# Patient Record
Sex: FEMALE | Race: WHITE | NOT HISPANIC OR LATINO | Employment: OTHER | URBAN - METROPOLITAN AREA
[De-identification: names, ages, dates, MRNs, and addresses within clinical notes are randomized per-mention and may not be internally consistent; named-entity substitution may affect disease eponyms.]

---

## 2021-01-01 ENCOUNTER — HOSPITAL ENCOUNTER (OUTPATIENT)
Facility: HOSPITAL | Age: 81
Discharge: NURSING, REHAB OR LONG-TERM CARE | End: 2021-01-03
Attending: EMERGENCY MEDICINE | Admitting: INTERNAL MEDICINE

## 2021-01-01 DIAGNOSIS — K92.2 GASTROINTESTINAL HEMORRHAGE, UNSPECIFIED GASTROINTESTINAL HEMORRHAGE TYPE: Primary | ICD-10-CM

## 2021-01-01 DIAGNOSIS — K44.9 HIATAL HERNIA: ICD-10-CM

## 2021-01-01 LAB
BASOPHILS # BLD AUTO: 0.1 10*3/MM3 (ref 0–0.2)
BASOPHILS NFR BLD AUTO: 0.6 % (ref 0–1.5)
BILIRUB UR QL STRIP: NEGATIVE
CLARITY UR: ABNORMAL
COLOR UR: YELLOW
DEPRECATED RDW RBC AUTO: 49.4 FL (ref 37–54)
EOSINOPHIL # BLD AUTO: 0.2 10*3/MM3 (ref 0–0.4)
EOSINOPHIL NFR BLD AUTO: 2.3 % (ref 0.3–6.2)
ERYTHROCYTE [DISTWIDTH] IN BLOOD BY AUTOMATED COUNT: 15.1 % (ref 12.3–15.4)
GLUCOSE UR STRIP-MCNC: NEGATIVE MG/DL
HCT VFR BLD AUTO: 37.4 % (ref 34–46.6)
HGB BLD-MCNC: 12.4 G/DL (ref 12–15.9)
HGB UR QL STRIP.AUTO: ABNORMAL
KETONES UR QL STRIP: ABNORMAL
LEUKOCYTE ESTERASE UR QL STRIP.AUTO: ABNORMAL
LYMPHOCYTES # BLD AUTO: 1.8 10*3/MM3 (ref 0.7–3.1)
LYMPHOCYTES NFR BLD AUTO: 18.7 % (ref 19.6–45.3)
MCH RBC QN AUTO: 30.9 PG (ref 26.6–33)
MCHC RBC AUTO-ENTMCNC: 33.3 G/DL (ref 31.5–35.7)
MCV RBC AUTO: 92.9 FL (ref 79–97)
MONOCYTES # BLD AUTO: 0.9 10*3/MM3 (ref 0.1–0.9)
MONOCYTES NFR BLD AUTO: 9.2 % (ref 5–12)
NEUTROPHILS NFR BLD AUTO: 6.6 10*3/MM3 (ref 1.7–7)
NEUTROPHILS NFR BLD AUTO: 69.2 % (ref 42.7–76)
NITRITE UR QL STRIP: NEGATIVE
NRBC BLD AUTO-RTO: 0.1 /100 WBC (ref 0–0.2)
PH UR STRIP.AUTO: 6.5 [PH] (ref 5–8)
PLATELET # BLD AUTO: 323 10*3/MM3 (ref 140–450)
PMV BLD AUTO: 8 FL (ref 6–12)
PROT UR QL STRIP: ABNORMAL
RBC # BLD AUTO: 4.02 10*6/MM3 (ref 3.77–5.28)
SP GR UR STRIP: 1.02 (ref 1–1.03)
UROBILINOGEN UR QL STRIP: ABNORMAL
WBC # BLD AUTO: 9.6 10*3/MM3 (ref 3.4–10.8)

## 2021-01-01 PROCEDURE — 81001 URINALYSIS AUTO W/SCOPE: CPT | Performed by: EMERGENCY MEDICINE

## 2021-01-01 PROCEDURE — 80053 COMPREHEN METABOLIC PANEL: CPT | Performed by: EMERGENCY MEDICINE

## 2021-01-01 PROCEDURE — 96374 THER/PROPH/DIAG INJ IV PUSH: CPT

## 2021-01-01 PROCEDURE — 85730 THROMBOPLASTIN TIME PARTIAL: CPT | Performed by: EMERGENCY MEDICINE

## 2021-01-01 PROCEDURE — 82150 ASSAY OF AMYLASE: CPT | Performed by: EMERGENCY MEDICINE

## 2021-01-01 PROCEDURE — 86850 RBC ANTIBODY SCREEN: CPT | Performed by: EMERGENCY MEDICINE

## 2021-01-01 PROCEDURE — 83036 HEMOGLOBIN GLYCOSYLATED A1C: CPT | Performed by: INTERNAL MEDICINE

## 2021-01-01 PROCEDURE — P9612 CATHETERIZE FOR URINE SPEC: HCPCS

## 2021-01-01 PROCEDURE — C9803 HOPD COVID-19 SPEC COLLECT: HCPCS

## 2021-01-01 PROCEDURE — 87086 URINE CULTURE/COLONY COUNT: CPT | Performed by: EMERGENCY MEDICINE

## 2021-01-01 PROCEDURE — 93005 ELECTROCARDIOGRAM TRACING: CPT | Performed by: EMERGENCY MEDICINE

## 2021-01-01 PROCEDURE — 87077 CULTURE AEROBIC IDENTIFY: CPT | Performed by: EMERGENCY MEDICINE

## 2021-01-01 PROCEDURE — 86901 BLOOD TYPING SEROLOGIC RH(D): CPT

## 2021-01-01 PROCEDURE — 84484 ASSAY OF TROPONIN QUANT: CPT | Performed by: EMERGENCY MEDICINE

## 2021-01-01 PROCEDURE — 86900 BLOOD TYPING SEROLOGIC ABO: CPT

## 2021-01-01 PROCEDURE — 85025 COMPLETE CBC W/AUTO DIFF WBC: CPT | Performed by: EMERGENCY MEDICINE

## 2021-01-01 PROCEDURE — 87635 SARS-COV-2 COVID-19 AMP PRB: CPT | Performed by: EMERGENCY MEDICINE

## 2021-01-01 PROCEDURE — 85610 PROTHROMBIN TIME: CPT | Performed by: EMERGENCY MEDICINE

## 2021-01-01 PROCEDURE — 99285 EMERGENCY DEPT VISIT HI MDM: CPT

## 2021-01-01 PROCEDURE — 83690 ASSAY OF LIPASE: CPT | Performed by: EMERGENCY MEDICINE

## 2021-01-01 PROCEDURE — 86900 BLOOD TYPING SEROLOGIC ABO: CPT | Performed by: EMERGENCY MEDICINE

## 2021-01-01 PROCEDURE — 87186 SC STD MICRODIL/AGAR DIL: CPT | Performed by: EMERGENCY MEDICINE

## 2021-01-01 PROCEDURE — 86901 BLOOD TYPING SEROLOGIC RH(D): CPT | Performed by: EMERGENCY MEDICINE

## 2021-01-01 RX ORDER — PANTOPRAZOLE SODIUM 40 MG/10ML
80 INJECTION, POWDER, LYOPHILIZED, FOR SOLUTION INTRAVENOUS ONCE
Status: COMPLETED | OUTPATIENT
Start: 2021-01-01 | End: 2021-01-01

## 2021-01-01 RX ADMIN — PANTOPRAZOLE SODIUM 80 MG: 40 INJECTION, POWDER, FOR SOLUTION INTRAVENOUS at 23:56

## 2021-01-02 ENCOUNTER — ANESTHESIA (OUTPATIENT)
Dept: GASTROENTEROLOGY | Facility: HOSPITAL | Age: 81
End: 2021-01-02

## 2021-01-02 ENCOUNTER — ANESTHESIA EVENT (OUTPATIENT)
Dept: GASTROENTEROLOGY | Facility: HOSPITAL | Age: 81
End: 2021-01-02

## 2021-01-02 ENCOUNTER — APPOINTMENT (OUTPATIENT)
Dept: CT IMAGING | Facility: HOSPITAL | Age: 81
End: 2021-01-02

## 2021-01-02 ENCOUNTER — ON CAMPUS - OUTPATIENT (OUTPATIENT)
Dept: URBAN - METROPOLITAN AREA HOSPITAL 85 | Facility: HOSPITAL | Age: 81
End: 2021-01-02
Payer: MEDICARE

## 2021-01-02 DIAGNOSIS — K94.29 OTHER COMPLICATIONS OF GASTROSTOMY: ICD-10-CM

## 2021-01-02 DIAGNOSIS — K92.0 HEMATEMESIS: ICD-10-CM

## 2021-01-02 DIAGNOSIS — K44.9 DIAPHRAGMATIC HERNIA WITHOUT OBSTRUCTION OR GANGRENE: ICD-10-CM

## 2021-01-02 PROBLEM — K92.2 GASTROINTESTINAL HEMORRHAGE: Status: ACTIVE | Noted: 2021-01-02

## 2021-01-02 LAB
ABO GROUP BLD: NORMAL
ALBUMIN SERPL-MCNC: 3.7 G/DL (ref 3.5–5.2)
ALBUMIN/GLOB SERPL: 1.1 G/DL
ALP SERPL-CCNC: 93 U/L (ref 39–117)
ALT SERPL W P-5'-P-CCNC: 9 U/L (ref 1–33)
AMYLASE SERPL-CCNC: 38 U/L (ref 28–100)
ANION GAP SERPL CALCULATED.3IONS-SCNC: 10 MMOL/L (ref 5–15)
ANION GAP SERPL CALCULATED.3IONS-SCNC: 10 MMOL/L (ref 5–15)
APTT PPP: 30.8 SECONDS (ref 24–31)
AST SERPL-CCNC: 14 U/L (ref 1–32)
BACTERIA UR QL AUTO: ABNORMAL /HPF
BILIRUB SERPL-MCNC: 0.3 MG/DL (ref 0–1.2)
BLD GP AB SCN SERPL QL: NEGATIVE
BUN SERPL-MCNC: 19 MG/DL (ref 8–23)
BUN SERPL-MCNC: 20 MG/DL (ref 8–23)
BUN/CREAT SERPL: 12.6 (ref 7–25)
BUN/CREAT SERPL: 13.9 (ref 7–25)
CALCIUM SPEC-SCNC: 9.8 MG/DL (ref 8.6–10.5)
CALCIUM SPEC-SCNC: 9.8 MG/DL (ref 8.6–10.5)
CHLORIDE SERPL-SCNC: 100 MMOL/L (ref 98–107)
CHLORIDE SERPL-SCNC: 95 MMOL/L (ref 98–107)
CO2 SERPL-SCNC: 32 MMOL/L (ref 22–29)
CO2 SERPL-SCNC: 34 MMOL/L (ref 22–29)
CREAT SERPL-MCNC: 1.44 MG/DL (ref 0.57–1)
CREAT SERPL-MCNC: 1.51 MG/DL (ref 0.57–1)
GFR SERPL CREATININE-BSD FRML MDRD: 33 ML/MIN/1.73
GFR SERPL CREATININE-BSD FRML MDRD: 35 ML/MIN/1.73
GLOBULIN UR ELPH-MCNC: 3.4 GM/DL
GLUCOSE BLDC GLUCOMTR-MCNC: 118 MG/DL (ref 70–105)
GLUCOSE BLDC GLUCOMTR-MCNC: 78 MG/DL (ref 70–105)
GLUCOSE BLDC GLUCOMTR-MCNC: 80 MG/DL (ref 70–105)
GLUCOSE SERPL-MCNC: 186 MG/DL (ref 65–99)
GLUCOSE SERPL-MCNC: 187 MG/DL (ref 65–99)
HBA1C MFR BLD: 6.4 % (ref 3.5–5.6)
HCT VFR BLD AUTO: 34.8 % (ref 34–46.6)
HGB BLD-MCNC: 11.4 G/DL (ref 12–15.9)
HYALINE CASTS UR QL AUTO: ABNORMAL /LPF
INR PPP: 1.19 (ref 0.93–1.1)
IRON 24H UR-MRATE: 60 MCG/DL (ref 37–145)
IRON SATN MFR SERPL: 21 % (ref 20–50)
LIPASE SERPL-CCNC: 16 U/L (ref 13–60)
POTASSIUM SERPL-SCNC: 3.9 MMOL/L (ref 3.5–5.2)
POTASSIUM SERPL-SCNC: 4 MMOL/L (ref 3.5–5.2)
PROT SERPL-MCNC: 7.1 G/DL (ref 6–8.5)
PROTHROMBIN TIME: 13 SECONDS (ref 9.6–11.7)
RBC # UR: ABNORMAL /HPF
REF LAB TEST METHOD: ABNORMAL
RH BLD: POSITIVE
SARS-COV-2 RNA PNL SPEC NAA+PROBE: NORMAL
SODIUM SERPL-SCNC: 139 MMOL/L (ref 136–145)
SODIUM SERPL-SCNC: 142 MMOL/L (ref 136–145)
SQUAMOUS #/AREA URNS HPF: ABNORMAL /HPF
T&S EXPIRATION DATE: NORMAL
TIBC SERPL-MCNC: 288 MCG/DL (ref 298–536)
TRANSFERRIN SERPL-MCNC: 193 MG/DL (ref 200–360)
TROPONIN T SERPL-MCNC: <0.01 NG/ML (ref 0–0.03)
WBC UR QL AUTO: ABNORMAL /HPF

## 2021-01-02 PROCEDURE — 80048 BASIC METABOLIC PNL TOTAL CA: CPT | Performed by: INTERNAL MEDICINE

## 2021-01-02 PROCEDURE — A9270 NON-COVERED ITEM OR SERVICE: HCPCS | Performed by: INTERNAL MEDICINE

## 2021-01-02 PROCEDURE — 63710000001 NYSTATIN 100000 UNIT/GM POWDER 15 G BOTTLE: Performed by: INTERNAL MEDICINE

## 2021-01-02 PROCEDURE — 63710000001 INSULIN LISPRO (HUMAN) PER 5 UNITS: Performed by: INTERNAL MEDICINE

## 2021-01-02 PROCEDURE — 63710000001 CARBIDOPA-LEVODOPA 25-100 MG TABLET: Performed by: INTERNAL MEDICINE

## 2021-01-02 PROCEDURE — 74176 CT ABD & PELVIS W/O CONTRAST: CPT

## 2021-01-02 PROCEDURE — G0378 HOSPITAL OBSERVATION PER HR: HCPCS

## 2021-01-02 PROCEDURE — 25010000002 CEFTRIAXONE IN SWFI 1 GRAM/10ML IV PUSH SYRINGE (SIMPLE): Performed by: EMERGENCY MEDICINE

## 2021-01-02 PROCEDURE — 84466 ASSAY OF TRANSFERRIN: CPT | Performed by: INTERNAL MEDICINE

## 2021-01-02 PROCEDURE — 82962 GLUCOSE BLOOD TEST: CPT

## 2021-01-02 PROCEDURE — 63710000001 METOPROLOL TARTRATE 50 MG TABLET: Performed by: INTERNAL MEDICINE

## 2021-01-02 PROCEDURE — 63710000001 INSULIN GLARGINE PER 5 UNITS: Performed by: INTERNAL MEDICINE

## 2021-01-02 PROCEDURE — 83540 ASSAY OF IRON: CPT | Performed by: INTERNAL MEDICINE

## 2021-01-02 PROCEDURE — 96375 TX/PRO/DX INJ NEW DRUG ADDON: CPT

## 2021-01-02 PROCEDURE — 25010000002 PROPOFOL 10 MG/ML EMULSION: Performed by: ANESTHESIOLOGY

## 2021-01-02 PROCEDURE — 99220 PR INITIAL OBSERVATION CARE/DAY 70 MINUTES: CPT | Performed by: INTERNAL MEDICINE

## 2021-01-02 PROCEDURE — 85014 HEMATOCRIT: CPT | Performed by: INTERNAL MEDICINE

## 2021-01-02 PROCEDURE — 96361 HYDRATE IV INFUSION ADD-ON: CPT

## 2021-01-02 PROCEDURE — 96376 TX/PRO/DX INJ SAME DRUG ADON: CPT

## 2021-01-02 PROCEDURE — 63710000001 DOCUSATE SODIUM 100 MG CAPSULE: Performed by: INTERNAL MEDICINE

## 2021-01-02 PROCEDURE — 85018 HEMOGLOBIN: CPT | Performed by: INTERNAL MEDICINE

## 2021-01-02 PROCEDURE — 43235 EGD DIAGNOSTIC BRUSH WASH: CPT | Performed by: INTERNAL MEDICINE

## 2021-01-02 RX ORDER — NYSTATIN 100000 [USP'U]/G
POWDER TOPICAL 3 TIMES DAILY
Status: DISCONTINUED | OUTPATIENT
Start: 2021-01-02 | End: 2021-01-03 | Stop reason: HOSPADM

## 2021-01-02 RX ORDER — FAMOTIDINE 20 MG/1
20 TABLET, FILM COATED ORAL NIGHTLY
COMMUNITY
End: 2021-01-03 | Stop reason: HOSPADM

## 2021-01-02 RX ORDER — ONDANSETRON 2 MG/ML
4 INJECTION INTRAMUSCULAR; INTRAVENOUS EVERY 6 HOURS PRN
Status: DISCONTINUED | OUTPATIENT
Start: 2021-01-02 | End: 2021-01-02

## 2021-01-02 RX ORDER — DEXTROSE MONOHYDRATE 25 G/50ML
25 INJECTION, SOLUTION INTRAVENOUS
Status: DISCONTINUED | OUTPATIENT
Start: 2021-01-02 | End: 2021-01-03 | Stop reason: HOSPADM

## 2021-01-02 RX ORDER — ONDANSETRON 4 MG/1
4 TABLET, FILM COATED ORAL EVERY 6 HOURS PRN
Status: DISCONTINUED | OUTPATIENT
Start: 2021-01-02 | End: 2021-01-02

## 2021-01-02 RX ORDER — ACETAMINOPHEN 325 MG/1
650 TABLET ORAL EVERY 6 HOURS PRN
COMMUNITY

## 2021-01-02 RX ORDER — PROPOFOL 10 MG/ML
VIAL (ML) INTRAVENOUS AS NEEDED
Status: DISCONTINUED | OUTPATIENT
Start: 2021-01-02 | End: 2021-01-02 | Stop reason: SURG

## 2021-01-02 RX ORDER — NICOTINE POLACRILEX 4 MG
15 LOZENGE BUCCAL
Status: DISCONTINUED | OUTPATIENT
Start: 2021-01-02 | End: 2021-01-03 | Stop reason: HOSPADM

## 2021-01-02 RX ORDER — MELATONIN
2000 DAILY
COMMUNITY
End: 2021-12-14

## 2021-01-02 RX ORDER — INSULIN GLARGINE 100 [IU]/ML
18 INJECTION, SOLUTION SUBCUTANEOUS 2 TIMES DAILY
Status: DISCONTINUED | OUTPATIENT
Start: 2021-01-02 | End: 2021-01-03 | Stop reason: HOSPADM

## 2021-01-02 RX ORDER — METOPROLOL TARTRATE 50 MG/1
50 TABLET, FILM COATED ORAL 2 TIMES DAILY
COMMUNITY
End: 2023-01-16

## 2021-01-02 RX ORDER — PANTOPRAZOLE SODIUM 40 MG/10ML
40 INJECTION, POWDER, LYOPHILIZED, FOR SOLUTION INTRAVENOUS EVERY 12 HOURS SCHEDULED
Status: DISCONTINUED | OUTPATIENT
Start: 2021-01-02 | End: 2021-01-02

## 2021-01-02 RX ORDER — DOCUSATE SODIUM 100 MG/1
100 CAPSULE, LIQUID FILLED ORAL 2 TIMES DAILY
COMMUNITY
End: 2021-12-14

## 2021-01-02 RX ORDER — ALLOPURINOL 100 MG/1
200 TABLET ORAL DAILY
Status: DISCONTINUED | OUTPATIENT
Start: 2021-01-02 | End: 2021-01-03 | Stop reason: HOSPADM

## 2021-01-02 RX ORDER — ALLOPURINOL 100 MG/1
200 TABLET ORAL DAILY
COMMUNITY

## 2021-01-02 RX ORDER — ASPIRIN 81 MG/1
81 TABLET ORAL DAILY
COMMUNITY

## 2021-01-02 RX ORDER — PANTOPRAZOLE SODIUM 40 MG/1
40 TABLET, DELAYED RELEASE ORAL
Status: DISCONTINUED | OUTPATIENT
Start: 2021-01-03 | End: 2021-01-03 | Stop reason: HOSPADM

## 2021-01-02 RX ORDER — NYSTATIN 100000 [USP'U]/G
POWDER TOPICAL 3 TIMES DAILY
COMMUNITY
End: 2023-01-16

## 2021-01-02 RX ORDER — SODIUM CHLORIDE 9 MG/ML
100 INJECTION, SOLUTION INTRAVENOUS CONTINUOUS
Status: DISCONTINUED | OUTPATIENT
Start: 2021-01-02 | End: 2021-01-03 | Stop reason: HOSPADM

## 2021-01-02 RX ORDER — IBUPROFEN 600 MG/1
TABLET ORAL AS NEEDED
Status: ON HOLD | COMMUNITY
End: 2022-05-06

## 2021-01-02 RX ORDER — DOCUSATE SODIUM 100 MG/1
100 CAPSULE, LIQUID FILLED ORAL 2 TIMES DAILY
Status: DISCONTINUED | OUTPATIENT
Start: 2021-01-02 | End: 2021-01-03 | Stop reason: HOSPADM

## 2021-01-02 RX ORDER — ONDANSETRON 4 MG/1
4 TABLET, FILM COATED ORAL EVERY 6 HOURS PRN
Status: DISCONTINUED | OUTPATIENT
Start: 2021-01-02 | End: 2021-01-03 | Stop reason: HOSPADM

## 2021-01-02 RX ORDER — MIDAZOLAM HYDROCHLORIDE 1 MG/ML
1 INJECTION INTRAMUSCULAR; INTRAVENOUS
Status: DISCONTINUED | OUTPATIENT
Start: 2021-01-02 | End: 2021-01-03 | Stop reason: HOSPADM

## 2021-01-02 RX ORDER — METOPROLOL TARTRATE 50 MG/1
50 TABLET, FILM COATED ORAL 2 TIMES DAILY
Status: DISCONTINUED | OUTPATIENT
Start: 2021-01-02 | End: 2021-01-03 | Stop reason: HOSPADM

## 2021-01-02 RX ORDER — INSULIN GLARGINE 100 [IU]/ML
10 INJECTION, SOLUTION SUBCUTANEOUS DAILY
Status: ON HOLD | COMMUNITY
End: 2021-12-17 | Stop reason: SDUPTHER

## 2021-01-02 RX ORDER — SODIUM CHLORIDE 0.9 % (FLUSH) 0.9 %
10 SYRINGE (ML) INJECTION EVERY 12 HOURS SCHEDULED
Status: DISCONTINUED | OUTPATIENT
Start: 2021-01-02 | End: 2021-01-03 | Stop reason: HOSPADM

## 2021-01-02 RX ORDER — SODIUM CHLORIDE 0.9 % (FLUSH) 0.9 %
10 SYRINGE (ML) INJECTION AS NEEDED
Status: DISCONTINUED | OUTPATIENT
Start: 2021-01-02 | End: 2021-01-03 | Stop reason: HOSPADM

## 2021-01-02 RX ORDER — MELATONIN
2000 DAILY
Status: DISCONTINUED | OUTPATIENT
Start: 2021-01-02 | End: 2021-01-03 | Stop reason: HOSPADM

## 2021-01-02 RX ORDER — INSULIN LISPRO 100 [IU]/ML
0-9 INJECTION, SOLUTION INTRAVENOUS; SUBCUTANEOUS AS NEEDED
Status: DISCONTINUED | OUTPATIENT
Start: 2021-01-02 | End: 2021-01-03 | Stop reason: HOSPADM

## 2021-01-02 RX ORDER — CLOPIDOGREL BISULFATE 75 MG/1
75 TABLET ORAL DAILY
COMMUNITY

## 2021-01-02 RX ORDER — INSULIN LISPRO 100 [IU]/ML
0-9 INJECTION, SOLUTION INTRAVENOUS; SUBCUTANEOUS
Status: DISCONTINUED | OUTPATIENT
Start: 2021-01-02 | End: 2021-01-03 | Stop reason: HOSPADM

## 2021-01-02 RX ORDER — ONDANSETRON 2 MG/ML
4 INJECTION INTRAMUSCULAR; INTRAVENOUS EVERY 6 HOURS PRN
Status: DISCONTINUED | OUTPATIENT
Start: 2021-01-02 | End: 2021-01-03 | Stop reason: HOSPADM

## 2021-01-02 RX ORDER — LIDOCAINE HYDROCHLORIDE 10 MG/ML
INJECTION, SOLUTION EPIDURAL; INFILTRATION; INTRACAUDAL; PERINEURAL AS NEEDED
Status: DISCONTINUED | OUTPATIENT
Start: 2021-01-02 | End: 2021-01-02 | Stop reason: SURG

## 2021-01-02 RX ORDER — ACETAMINOPHEN 325 MG/1
650 TABLET ORAL EVERY 6 HOURS PRN
Status: DISCONTINUED | OUTPATIENT
Start: 2021-01-02 | End: 2021-01-03 | Stop reason: HOSPADM

## 2021-01-02 RX ORDER — FUROSEMIDE 40 MG/1
40 TABLET ORAL DAILY
COMMUNITY
End: 2021-01-03 | Stop reason: HOSPADM

## 2021-01-02 RX ADMIN — NYSTATIN: 100000 POWDER TOPICAL at 09:48

## 2021-01-02 RX ADMIN — INSULIN GLARGINE 18 UNITS: 100 INJECTION, SOLUTION SUBCUTANEOUS at 09:49

## 2021-01-02 RX ADMIN — INSULIN LISPRO 2 UNITS: 100 INJECTION, SOLUTION INTRAVENOUS; SUBCUTANEOUS at 06:41

## 2021-01-02 RX ADMIN — CARBIDOPA AND LEVODOPA 1 TABLET: 25; 100 TABLET ORAL at 21:47

## 2021-01-02 RX ADMIN — LIDOCAINE HYDROCHLORIDE 30 MG: 10 INJECTION, SOLUTION EPIDURAL; INFILTRATION; INTRACAUDAL; PERINEURAL at 12:11

## 2021-01-02 RX ADMIN — PANTOPRAZOLE SODIUM 40 MG: 40 INJECTION, POWDER, FOR SOLUTION INTRAVENOUS at 09:48

## 2021-01-02 RX ADMIN — Medication 10 ML: at 14:33

## 2021-01-02 RX ADMIN — CEFTRIAXONE SODIUM 1 G: 1 INJECTION, POWDER, FOR SOLUTION INTRAMUSCULAR; INTRAVENOUS at 01:38

## 2021-01-02 RX ADMIN — PROPOFOL 20 MG: 10 INJECTION, EMULSION INTRAVENOUS at 12:17

## 2021-01-02 RX ADMIN — DOCUSATE SODIUM 100 MG: 100 CAPSULE, LIQUID FILLED ORAL at 21:47

## 2021-01-02 RX ADMIN — NYSTATIN: 100000 POWDER TOPICAL at 21:47

## 2021-01-02 RX ADMIN — METOPROLOL TARTRATE 50 MG: 50 TABLET, FILM COATED ORAL at 21:47

## 2021-01-02 RX ADMIN — PROPOFOL 70 MG: 10 INJECTION, EMULSION INTRAVENOUS at 12:11

## 2021-01-02 RX ADMIN — PROPOFOL 20 MG: 10 INJECTION, EMULSION INTRAVENOUS at 12:13

## 2021-01-02 RX ADMIN — CARBIDOPA AND LEVODOPA 1 TABLET: 25; 100 TABLET ORAL at 17:00

## 2021-01-02 RX ADMIN — PROPOFOL 20 MG: 10 INJECTION, EMULSION INTRAVENOUS at 12:15

## 2021-01-02 RX ADMIN — SODIUM CHLORIDE 100 ML/HR: 9 INJECTION, SOLUTION INTRAVENOUS at 21:47

## 2021-01-02 RX ADMIN — SODIUM CHLORIDE 100 ML/HR: 9 INJECTION, SOLUTION INTRAVENOUS at 05:08

## 2021-01-02 RX ADMIN — NYSTATIN: 100000 POWDER TOPICAL at 16:00

## 2021-01-02 NOTE — CONSULTS
"Patient Care Team:  Giovani Mejia MD as PCP - General (Geriatric Medicine)    Chief complaint: Coffee-ground emesis    Subjective  \"how did I get here?\"    History of present illness:    Patient is a 80-year-old female with a history of iron deficiency anemia, dementia, Covid in March 2020, CVA on aspirin and Plavix, diabetes, GERD, hypertension and Parkinson's disease who was brought to the ER from long-term care facility on 1/1/2020 with coffee-ground emesis.  Patient's hemoglobin is 11.4.  CT reveals a large paraesophageal hernia otherwise negative.  Patient is confused & can not provide any history.    Endo History:  2014 EGD (Dr. Rubin) moderate hiatal hernia.  Minimal prepyloric inflammation.  5/2012 EGD - moderate hiatal hernia, reflux changes  8/2011 Colonoscopy - diverticulosis     Past Medical History:  Past Medical History:   Diagnosis Date   • Anemia    • Atherosclerotic heart disease of native coronary artery without angina pectoris    • COPD (chronic obstructive pulmonary disease) (CMS/[facility])    • COVID-19 03/2020   • Dementia (CMS/[facility])    • Diabetes mellitus (CMS/[facility])    • GERD (gastroesophageal reflux disease)    • Gout    • Hyperlipidemia    • Hypertension    • Localized edema    • Parkinson's disease (CMS/[facility])    • Vitamin D deficiency        Past Surgical History:  History reviewed. No pertinent surgical history.    Social History:  Social History     Tobacco Use   • Smoking status: Not on file   Substance Use Topics   • Alcohol use: Not on file   • Drug use: Not on file       Family History:  History reviewed. No pertinent family history.    Medications:  (Not in a hospital admission)      Scheduled Meds:allopurinol, 200 mg, Oral, Daily  carbidopa-levodopa, 1 tablet, Oral, TID  [START ON 1/3/2021] cefTRIAXone, 1 g, Intravenous, Q24H  cholecalciferol, 2,000 Units, Oral, Daily  docusate sodium, 100 mg, Oral, BID  insulin glargine, 18 Units, Subcutaneous, BID  insulin lispro, 0-9 Units, " Subcutaneous, TID AC  metoprolol tartrate, 50 mg, Oral, BID  nystatin, , Topical, TID  pantoprazole, 40 mg, Intravenous, Q12H  sodium chloride, 10 mL, Intravenous, Q12H      Continuous Infusions:sodium chloride, 100 mL/hr, Last Rate: 100 mL/hr (01/02/21 0508)      PRN Meds:.•  acetaminophen  •  dextrose  •  dextrose  •  glucagon (human recombinant)  •  insulin lispro **AND** insulin lispro  •  sodium chloride    ALLERGIES:  Codeine, Latex, and Namenda [memantine]    ROS:  Dementia      Objective  Resting in hospital bed     Vital Signs:   Vitals:    01/02/21 0319 01/02/21 0419 01/02/21 0504 01/02/21 0604   BP: 126/44 148/93 141/69 108/67   BP Location:       Patient Position:       Pulse: 83 80 87 75   Resp: 17 18 18 18   Temp:       TempSrc:       SpO2: 97% 96% 99% 95%   Weight:       Height:           Physical Exam:   General Appearance:    Awake and alert to person , in no acute distress   Head:    Normocephalic, without obvious abnormality, atraumatic   Eyes:            Conjunctivae normal, anicteric sclera, pupils equal   Ears:    Ears appear intact with no abnormalities noted   Throat:   No oral lesions, no thrush, oral mucosa moist. NG tube in place with brown secretions noted in tubing. None in cannister.    Neck:   Supple, no JVD   Lungs:     Clear to auscultation bilaterally, respirations regular, even and unlabored    Heart:    Regular rhythm and normal rate, normal S1 and S2, no            Murmur appreciated   Chest Wall:    No abnormalities observed   Abdomen:     Normal bowel sounds, soft, non-tender, no rebound or guarding, non-distended, no hepatosplenomegaly   Rectal:     Deferred   Extremities:   Moves all extremities, 2+ edema, no cyanosis   Pulses:   Pulses palpable and equal bilaterally   Skin:   No rash, no jaundice, normal palpaion   Lymph nodes:   No cervical, supraclavicular or submandibular palpable adenopathy   Neurologic:    AOO x1      Results Review:   I reviewed the patient's labs and  imaging.  Lab Results (last 24 hours)     Procedure Component Value Units Date/Time    Basic Metabolic Panel [[id number]]  (Abnormal) Collected: 01/02/21 0512    Specimen: Blood Updated: 01/02/21 0607     Glucose 187 mg/dL      BUN 20 mg/dL      Creatinine 1.44 mg/dL      Sodium 142 mmol/L      Potassium 4.0 mmol/L      Chloride 100 mmol/L      CO2 32.0 mmol/L      Calcium 9.8 mg/dL      eGFR Non African Amer 35 mL/min/1.73      BUN/Creatinine Ratio 13.9     Anion Gap 10.0 mmol/L     Narrative:      GFR Normal >60  Chronic Kidney Disease <60  Kidney Failure <15      Hemoglobin & Hematocrit, Blood [[id number]]  (Abnormal) Collected: 01/02/21 0512    Specimen: Blood Updated: 01/02/21 0524     Hemoglobin 11.4 g/dL      Hematocrit 34.8 %     Hemoglobin A1c [[id number]] Collected: 01/01/21 2342    Specimen: Blood from Arm, Right Updated: 01/02/21 0506    Comprehensive Metabolic Panel [[id number]]  (Abnormal) Collected: 01/01/21 2342    Specimen: Blood from Arm, Right Updated: 01/02/21 0017     Glucose 186 mg/dL      BUN 19 mg/dL      Creatinine 1.51 mg/dL      Sodium 139 mmol/L      Potassium 3.9 mmol/L      Chloride 95 mmol/L      CO2 34.0 mmol/L      Calcium 9.8 mg/dL      Total Protein 7.1 g/dL      Albumin 3.70 g/dL      ALT (SGPT) 9 U/L      AST (SGOT) 14 U/L      Alkaline Phosphatase 93 U/L      Total Bilirubin 0.3 mg/dL      eGFR Non African Amer 33 mL/min/1.73      Globulin 3.4 gm/dL      A/G Ratio 1.1 g/dL      BUN/Creatinine Ratio 12.6     Anion Gap 10.0 mmol/L     Narrative:      GFR Normal >60  Chronic Kidney Disease <60  Kidney Failure <15      Troponin [[id number]]  (Normal) Collected: 01/01/21 2342    Specimen: Blood from Arm, Right Updated: 01/02/21 0017     Troponin T <0.010 ng/mL     Narrative:      Troponin T Reference Range:  <= 0.03 ng/mL-   Negative for AMI  >0.03 ng/mL-     Abnormal for myocardial necrosis.  Clinicians would have to utilize clinical acumen, EKG, Troponin and serial changes to determine  if it is an Acute Myocardial Infarction or myocardial injury due to an underlying chronic condition.       Results may be falsely decreased if patient taking Biotin.      Amylase [[id number]]  (Normal) Collected: 01/01/21 2342    Specimen: Blood from Arm, Right Updated: 01/02/21 0017     Amylase 38 U/L     Lipase [[id number]]  (Normal) Collected: 01/01/21 2342    Specimen: Blood from Arm, Right Updated: 01/02/21 0017     Lipase 16 U/L     aPTT [[id number]]  (Normal) Collected: 01/01/21 2342    Specimen: Blood from Arm, Right Updated: 01/02/21 0010     PTT 30.8 seconds     Protime-INR [[id number]]  (Abnormal) Collected: 01/01/21 2342    Specimen: Blood from Arm, Right Updated: 01/02/21 0010     Protime 13.0 Seconds      INR 1.19    Urinalysis, Microscopic Only - Urine, Catheter [[id number]]  (Abnormal) Collected: 01/01/21 2338    Specimen: Urine, Catheter Updated: 01/02/21 0008     RBC, UA 0-2 /HPF      WBC, UA Too Numerous to Count /HPF      Bacteria, UA 4+ /HPF      Squamous Epithelial Cells, UA 3-6 /HPF      Hyaline Casts, UA None Seen /LPF      Methodology Manual Light Microscopy    Urine Culture - Urine, Urine, Catheter [[id number]] Collected: 01/01/21 2338    Specimen: Urine, Catheter Updated: 01/02/21 0008    COVID-19, [facility]-HOUSE,NASAL Swab (NO TRANSPORT MEDIA) 2 HR TAT - Swab, Nasopharynx [[id number]]  (Normal) Collected: 01/01/21 2342    Specimen: Swab from Nasopharynx Updated: 01/02/21 0007     COVID19 Presumptive Negative    Narrative:      Fact sheet for providers: https://www.fda.gov/media/092115/download     Fact sheet for patients: https://www.fda.gov/media/496630/download    Test performed by PCR.  If inconsistent with clinical signs and symptoms patient should be tested with different authorized molecular test.    Urinalysis With Culture If Indicated - Urine, Catheter [[id number]]  (Abnormal) Collected: 01/01/21 2338    Specimen: Urine, Catheter Updated: 01/01/21 2356     Color, UA Yellow      Appearance, UA Turbid     Comment: Result checked         pH, UA 6.5     Specific Gravity, UA 1.019     Glucose, UA Negative     Ketones, UA Trace     Bilirubin, UA Negative     Blood, UA Small (1+)     Protein, UA 30 mg/dL (1+)     Leuk Esterase, UA Large (3+)     Nitrite, UA Negative     Urobilinogen, UA 1.0 E.U./dL    CBC & Differential [[id number]]  (Abnormal) Collected: 01/01/21 2342    Specimen: Blood from Arm, Right Updated: 01/01/21 2354    Narrative:      The following orders were created for panel order CBC & Differential.  Procedure                               Abnormality         Status                     ---------                               -----------         ------                     CBC Auto Differential[[id number]]        Abnormal            Final result                 Please view results for these tests on the individual orders.    CBC Auto Differential [[id number]]  (Abnormal) Collected: 01/01/21 2342    Specimen: Blood from Arm, Right Updated: 01/01/21 2354     WBC 9.60 10*3/mm3      RBC 4.02 10*6/mm3      Hemoglobin 12.4 g/dL      Hematocrit 37.4 %      MCV 92.9 fL      MCH 30.9 pg      MCHC 33.3 g/dL      RDW 15.1 %      RDW-SD 49.4 fl      MPV 8.0 fL      Platelets 323 10*3/mm3      Neutrophil % 69.2 %      Lymphocyte % 18.7 %      Monocyte % 9.2 %      Eosinophil % 2.3 %      Basophil % 0.6 %      Neutrophils, Absolute 6.60 10*3/mm3      Lymphocytes, Absolute 1.80 10*3/mm3      Monocytes, Absolute 0.90 10*3/mm3      Eosinophils, Absolute 0.20 10*3/mm3      Basophils, Absolute 0.10 10*3/mm3      nRBC 0.1 /100 WBC           Imaging Results (Last 24 Hours)     Procedure Component Value Units Date/Time    CT Abdomen Pelvis Without Contrast [[id number]] Collected: 01/01/21 2314     Updated: 01/02/21 0121    Narrative:      EXAMINATION: CT ABDOMEN AND PELVIS WITHOUT IV CONTRAST       DATE OF EXAMINATION: 1/2/2021    INDICATION: Abdominal pain and vomiting    COMPARISON: None  available.    PROCEDURE:   Axial CT of the abdomen and pelvis was performed with sagittal and coronal reformatted images without contrast enhancement.  The exam is limited because some types of pathology may not be adequately demonstrated due to lack of contrast   enhancement.  CT dose lowering techniques were used, to include: automated exposure control, adjustment for patient size, and or use of iterative reconstruction.    FINDINGS:    LOWER CHEST :  Large paraesophageal hiatal hernia.  Calcification mitral valve annulus..  Mild chronic lung disease.    ABDOMEN:    Liver and Biliary system:  Normal.    Gallbladder:  Normal.    Spleen:  Normal.    Pancreas:  Normal.    Adrenal glands:  Normal.    Kidneys and ureters:  Normal. No masses or inflammatory process. No urolithiasis.    Aorta/IVC:   Atherosclerotic aorta. No aortic aneurysm or dissection.  IVC normal.    Lymph nodes:  No significant lymphadenopathy.    Stomach: There is a large paraesophageal hiatal hernia.  The portion the stomach above the diaphragm is moderately dilated and fluid-filled.    Bowel: No obstruction, free air, or ascites.  No mucosal thickening.    Appendix: Normal.    Peritoneum/Mesentery:  Normal.    Abdominal wall:  Normal.    PELVIS:  Urinary bladder:  Normal.    Reproductive organs:  Hysterectomy.    Lymph Nodes:  Normal.    BONES:  Marked chronic T12 compression fracture.  L3 vertebral plasty..    ADDITIONAL  SIGNIFICANT FINDINGS:  None.        Impression:      1.  Large paraesophageal hiatal hernia.  The gastric body is distended and fluid-filled due to obstruction at the level of the antrum as it passes through the diaphragm.  2.  Mitral valve annular calcification.  Coronary artery atherosclerosis.  3.  Sigmoid diverticulosis.  4.  Hysterectomy.  5.  Chronic T12 compression fracture.  L3 vertebral plasty.    Electronically signed by:  Emile Coelho M.D.    1/1/2021 11:20 PM             ASSESSMENT AND PLAN:  Coffee ground emesis  consider upper GI bleed from gastritis, esophagitis, Paco lesions or ulcer   Iron deficiency anemia  Dementia  CVA on aspirin and Plavix  Diabetes  GERD  UTI    PLAN:  Patient is sent in by extended care facility for coffee-ground emesis.  Patient is on Plavix and aspirin.  We will plan EGD around noon today.  Continue PPI for possible upper GI bleed.  N.p.o. until after scope.  Hold ASA & Plavix   COVID negative    I discussed the patients findings and my recommendations with the patient.  Alix Jimenez, MAE  01/02/21  07:42 EST    Time:

## 2021-01-02 NOTE — ED PROVIDER NOTES
Subjective   80-year-old female nursing home resident with dementia transferred for 2 episodes of suspected bloody emesis.  Patient is alert and oriented to person only without complaints.  Patient did vomit a large amount of coffee-ground emesis during the exam.  Patient denies any abdominal pain.          Review of Systems   Unable to perform ROS: Dementia       Past Medical History:   Diagnosis Date   • Anemia    • Atherosclerotic heart disease of native coronary artery without angina pectoris    • COPD (chronic obstructive pulmonary disease) (CMS/[facility])    • COVID-19 03/2020   • Dementia (CMS/[facility])    • Diabetes mellitus (CMS/[facility])    • GERD (gastroesophageal reflux disease)    • Gout    • Hyperlipidemia    • Hypertension    • Localized edema    • Parkinson's disease (CMS/[facility])    • Vitamin D deficiency        Allergies   Allergen Reactions   • Codeine Anaphylaxis   • Latex Anaphylaxis   • Namenda [Memantine] Anaphylaxis       History reviewed. No pertinent surgical history.    History reviewed. No pertinent family history.    Social History     Socioeconomic History   • Marital status:      Spouse name: Not on file   • Number of children: Not on file   • Years of education: Not on file   • Highest education level: Not on file           Objective   Physical Exam  Constitutional:       Appearance: Normal appearance.   HENT:      Head: Normocephalic and atraumatic.      Mouth/Throat:      Mouth: Mucous membranes are moist.      Pharynx: Oropharynx is clear.   Eyes:      Conjunctiva/sclera: Conjunctivae normal.      Pupils: Pupils are equal, round, and reactive to light.   Neck:      Musculoskeletal: Normal range of motion and neck supple.   Cardiovascular:      Rate and Rhythm: Normal rate and regular rhythm.   Pulmonary:      Effort: Pulmonary effort is normal.      Breath sounds: Normal breath sounds.   Abdominal:      General: Bowel sounds are normal. There is no distension.      Palpations: Abdomen is soft.       Tenderness: There is no abdominal tenderness.   Musculoskeletal: Normal range of motion.         General: No swelling or tenderness.   Skin:     General: Skin is warm and dry.      Capillary Refill: Capillary refill takes less than 2 seconds.   Neurological:      Mental Status: She is alert.      Comments: Oriented to person only, otherwise nonfocal   Psychiatric:         Mood and Affect: Mood normal.         Behavior: Behavior normal.         Procedures           ED Course  ED Course as of Jan 02 0150 Fri Jan 01, 2021   2355 EKG interpretation: Normal sinus rhythm, rate 89 with occasional PAC, no acute ST change    [JR]      ED Course User Index  [JR] Julio Chong MD                                           [facility]  Number of Diagnoses or Management Options  Gastrointestinal hemorrhage, unspecified gastrointestinal hemorrhage type:   Hiatal hernia:   Diagnosis management comments: Results for orders placed or performed during the hospital encounter of 01/01/21  -COVID-19, [facility] IN-HOUSE,NASAL Swab (NO TRANSPORT MEDIA) 2 HR TAT - Swab, Nasopharynx  Specimen: Nasopharynx; Swab       Result                      Value             Ref Range           COVID19                                       Presumptive *   Presumptive Negative  -Comprehensive Metabolic Panel  Specimen: Arm, Right; Blood       Result                      Value             Ref Range           Glucose                     186 (H)           65 - 99 mg/dL       BUN                         19                8 - 23 mg/dL        Creatinine                  1.51 (H)          0.57 - 1.00 *       Sodium                      139               136 - 145 mm*       Potassium                   3.9               3.5 - 5.2 mm*       Chloride                    95 (L)            98 - 107 mmo*       CO2                         34.0 (H)          22.0 - 29.0 *       Calcium                     9.8               8.6 - 10.5 m*       Total Protein               7.1                6.0 - 8.5 g/*       Albumin                     3.70              3.50 - 5.20 *       ALT (SGPT)                  9                 1 - 33 U/L          AST (SGOT)                  14                1 - 32 U/L          Alkaline Phosphatase        93                39 - 117 U/L        Total Bilirubin             0.3               0.0 - 1.2 mg*       eGFR Non  Amer       33 (L)            >60 mL/min/1*       Globulin                    3.4               gm/dL               A/G Ratio                   1.1               g/dL                BUN/Creatinine Ratio        12.6              7.0 - 25.0          Anion Gap                   10.0              5.0 - 15.0 m*  -Protime-INR  Specimen: Arm, Right; Blood       Result                      Value             Ref Range           Protime                     13.0 (H)          9.6 - 11.7 S*       INR                         1.19 (H)          0.93 - 1.10    -aPTT  Specimen: Arm, Right; Blood       Result                      Value             Ref Range           PTT                         30.8              24.0 - 31.0 *  -Troponin  Specimen: Arm, Right; Blood       Result                      Value             Ref Range           Troponin T                  <0.010            0.000 - 0.03*  -Urinalysis With Culture If Indicated - Urine, Catheter  Specimen: Urine, Catheter       Result                      Value             Ref Range           Color, UA                   Yellow            Yellow, Straw       Appearance, UA              Turbid (A)        Clear               pH, UA                      6.5               5.0 - 8.0           Specific [place], UA        1.019             1.005 - 1.030       Glucose, UA                 Negative          Negative            Ketones, UA                 Trace (A)         Negative            Bilirubin, UA               Negative          Negative            Blood, UA                                     Negative         Small (1+) (A)       Protein, UA                                   Negative        30 mg/dL (1+) (A)       Leuk Esterase, UA                             Negative        Large (3+) (A)       Nitrite, UA                 Negative          Negative            Urobilinogen, UA            1.0 E.U./dL       0.2 - 1.0 E.*  -Amylase  Specimen: Arm, Right; Blood       Result                      Value             Ref Range           Amylase                     38                28 - 100 U/L   -Lipase  Specimen: Arm, Right; Blood       Result                      Value             Ref Range           Lipase                      16                13 - 60 U/L    -CBC Auto Differential  Specimen: Arm, Right; Blood       Result                      Value             Ref Range           WBC                         9.60              3.40 - 10.80*       RBC                         4.02              3.77 - 5.28 *       Hemoglobin                  12.4              12.0 - 15.9 *       Hematocrit                  37.4              34.0 - 46.6 %       MCV                         92.9              79.0 - 97.0 *       MCH                         30.9              26.6 - 33.0 *       MCHC                        33.3              31.5 - 35.7 *       RDW                         15.1              12.3 - 15.4 %       RDW-SD                      49.4              37.0 - 54.0 *       MPV                         8.0               6.0 - 12.0 fL       Platelets                   323               140 - 450 10*       Neutrophil %                69.2              42.7 - 76.0 %       Lymphocyte %                18.7 (L)          19.6 - 45.3 %       Monocyte %                  9.2               5.0 - 12.0 %        Eosinophil %                2.3               0.3 - 6.2 %         Basophil %                  0.6               0.0 - 1.5 %         Neutrophils, Absolute       6.60              1.70 - 7.00 *       Lymphocytes, Absolute       1.80               0.70 - 3.10 *       Monocytes, Absolute         0.90              0.10 - 0.90 *       Eosinophils, Absolute       0.20              0.00 - 0.40 *       Basophils, Absolute         0.10              0.00 - 0.20 *       nRBC                        0.1               0.0 - 0.2 /1*  -Urinalysis, Microscopic Only - Urine, Catheter  Specimen: Urine, Catheter       Result                      Value             Ref Range           RBC, UA                     0-2 (A)           None Seen /H*       WBC, UA                                       None Seen /H*   Too Numerous to Count (A)       Bacteria, UA                4+ (A)            None Seen /H*       Squamous Epithelial Ce*     3-6 (A)           None Seen, 0*       Hyaline Casts, UA           None Seen         None Seen /L*       Methodology                                                   Manual Light Microscopy  -ECG 12 Lead       Result                      Value             Ref Range           QT Interval                 388               ms             -Type & Screen  Specimen: Arm, Right; Blood       Result                      Value             Ref Range           ABO Type                    B                                     RH type                     Positive                              Antibody Screen             Negative                              T&S Expiration Date                                           1/4/2021 11:59:59 PM    Patient appears well, comfortable, no pain, no vomiting outside of episode at presentation       Amount and/or Complexity of Data Reviewed  Clinical lab tests: reviewed  Tests in the radiology section of CPT®: reviewed        Final diagnoses:   Gastrointestinal hemorrhage, unspecified gastrointestinal hemorrhage type   Hiatal hernia            Julio Chong MD  01/02/21 0150

## 2021-01-02 NOTE — DISCHARGE SUMMARY
"  Date of Admission: 1/1/2021 4119/1    Date of Discharge:  1/3/2021    Length of stay:  LOS: 0 days     Patient was examined with relevant and adequate PPE keeping in mind the current coronavirus pandemic. Minimum of 10 minutes to don and doff PPE.      Presenting Problem/History of Present Illness   Present on Admission:  • Hiatal hernia  • Diabetes mellitus (CMS/HCC)  • Hyperlipidemia        Hospital Course  Chief complaint:  Chief Complaint   Patient presents with   • Vomiting       Nory F Weathers 80 y.o. female.    PCP  Giovani Mejia MD (General)    80-year-old  female, nursing home resident with dementia brought to the ED because she had 2 episodes of suspected bloody emesis.  No history could be obtained from the patient because of dementia.  No all medical records available  in our hospital.  No further information provided.  Patient is on aspirin and Plavix for unknown reason.     Emergency department course:  Afebrile, hemodynamically stable, no acute distress.  ED physician stated that during examination patient vomited large amount of coffee-ground emesis, tested heme positive, and stool was dark brown heme positive.  Labs were significant for blood glucose 186, creatinine 1.51 [unknown baseline kidney function], GFR 33, INR 1.19.  CBC was normal including hemoglobin level of 12.4.  Urine analysis showed turbid urine with large leukocyte Estrace, negative nitrite, WBCs too numerous to count with 4+ bacteria.  EKG showed sinus rhythm with occasional PAC.  CT scan of the abdomen and pelvis without contrast reported \"1.  Large paraesophageal hiatal hernia.  The gastric body is distended and fluid-filled due to obstruction at the level of the antrum as it passes through the diaphragm.  2.  Mitral valve annular calcification.  Coronary artery atherosclerosis.  3.  Sigmoid diverticulosis.  4.  Hysterectomy.  5.  Chronic T12 compression fracture.  L3 vertebral plasty \".  Patient was given " 1 g of Rocephin and 80 mg IV Protonix.      Patient had EGD done and shows very large hiatal hernia.  No active bleeding were discovered.  UTI was treated with single dose fosfomycin.  No further treatment necessary.  No need to wait for sensitivities    Review of Systems   Unable to perform ROS: dementia           Family History   Family history unknown: Yes        Past Medical History:   Diagnosis Date   • Anemia    • Atherosclerotic heart disease of native coronary artery without angina pectoris    • COPD (chronic obstructive pulmonary disease) (CMS/[facility])    • COVID-19 03/2020   • Dementia (CMS/[facility])    • Diabetes mellitus (CMS/[facility])    • GERD (gastroesophageal reflux disease)    • Gout    • Hyperlipidemia    • Hypertension    • Localized edema    • Parkinson's disease (CMS/[facility])    • Vitamin D deficiency        History reviewed. No pertinent surgical history.    Social History     Socioeconomic History   • Marital status:      Spouse name: Not on file   • Number of children: Not on file   • Years of education: Not on file   • Highest education level: Not on file   Tobacco Use   • Smoking status: Never Smoker   • Smokeless tobacco: Never Used   Substance and Sexual Activity   • Alcohol use: Never     Frequency: Never   • Drug use: Never   • Sexual activity: Defer   Social History Narrative    Nursing home resident       Vital Signs  Temp:  [97.3 °F (36.3 °C)-99.1 °F (37.3 °C)] 97.3 °F (36.3 °C)  Heart Rate:  [56] 56  Resp:  [12-17] 17  BP: (120-146)/(40-77) 120/40  Weight change:     Physical Exam:  Physical Exam  Vitals signs and nursing note reviewed.   Constitutional:       General: She is not in acute distress.     Appearance: She is well-developed. She is obese. She is not ill-appearing, toxic-appearing or diaphoretic.   HENT:      Head: Normocephalic and atraumatic.      Right Ear: Ear canal and external ear normal.      Left Ear: Ear canal and external ear normal.      Nose: Nose normal. No congestion  or rhinorrhea.      Mouth/Throat:      Mouth: Mucous membranes are dry.      Pharynx: No oropharyngeal exudate.      Comments: Edentulous  Eyes:      General: No scleral icterus.        Right eye: No discharge.         Left eye: No discharge.      Extraocular Movements: Extraocular movements intact.      Conjunctiva/sclera: Conjunctivae normal.      Pupils: Pupils are equal, round, and reactive to light.   Neck:      Musculoskeletal: Normal range of motion and neck supple. No neck rigidity or muscular tenderness.      Thyroid: No thyromegaly.      Vascular: No carotid bruit or JVD.      Trachea: No tracheal deviation.   Cardiovascular:      Rate and Rhythm: Normal rate and regular rhythm.      Heart sounds: Normal heart sounds. No murmur. No friction rub. No gallop.       Comments: Absent pedal pulses  Pulmonary:      Effort: Pulmonary effort is normal. No respiratory distress.      Breath sounds: Normal breath sounds. No stridor. No wheezing, rhonchi or rales.   Chest:      Chest wall: No tenderness.   Abdominal:      General: Bowel sounds are normal. There is no distension.      Palpations: Abdomen is soft. There is no mass.      Tenderness: There is no abdominal tenderness. There is no guarding or rebound.      Hernia: No hernia is present.   Musculoskeletal: Normal range of motion.         General: No swelling, tenderness, deformity or signs of injury.      Right lower leg: No edema.      Left lower leg: No edema.   Lymphadenopathy:      Cervical: No cervical adenopathy.   Skin:     General: Skin is warm and dry.      Coloration: Skin is not jaundiced or pale.      Findings: No bruising, erythema or rash.   Neurological:      General: No focal deficit present.      Mental Status: She is alert. Mental status is at baseline.      Cranial Nerves: No cranial nerve deficit.      Sensory: No sensory deficit.      Motor: No weakness or abnormal muscle tone.      Coordination: Coordination normal.   Psychiatric:          Mood and Affect: Mood normal.                 Discharge Diagnosis:     Active Hospital Problems    Diagnosis  POA   • **Hiatal hernia [K44.9]  Yes   • Diabetes mellitus (CMS/HCC) [E11.9]  Yes   • Hyperlipidemia [E78.5]  Yes      Resolved Hospital Problems   No resolved problems to display.       Estimated Creatinine Clearance: 37.1 mL/min (A) (by C-G formula based on SCr of 1.42 mg/dL (H)).    Discharge Disposition    Continued Care and Services - Admitted Since 1/1/2021    Coordination has not been started for this encounter.             PT Recommendation and Plan          Skilled Nursing Facility (DC - External)           Discharge Medications      New Medications      Instructions Start Date   pantoprazole 40 MG EC tablet  Commonly known as: PROTONIX   40 mg, Oral, Daily         Continue These Medications      Instructions Start Date   acetaminophen 325 MG tablet  Commonly known as: TYLENOL   650 mg, Oral, Every 6 Hours PRN      allopurinol 100 MG tablet  Commonly known as: ZYLOPRIM   200 mg, Oral, Daily      aspirin 81 MG EC tablet   81 mg, Oral, Daily      carbidopa-levodopa  MG per tablet  Commonly known as: SINEMET   1 tablet, Oral, 3 Times Daily      cholecalciferol 25 MCG (1000 UT) tablet  Commonly known as: VITAMIN D3   2,000 Units, Oral, Daily      clopidogrel 75 MG tablet  Commonly known as: PLAVIX   75 mg, Oral, Daily      docusate sodium 100 MG capsule  Commonly known as: COLACE   100 mg, Oral, 2 Times Daily      Glucagon Emergency 1 MG kit   Injection, As Needed      insulin glargine 100 UNIT/ML injection  Commonly known as: LANTUS, SEMGLEE   18 Units, Subcutaneous, 2 Times Daily      magnesium hydroxide 400 MG/5ML suspension  Commonly known as: MILK OF MAGNESIA   30 mL, Oral, Daily PRN      metoprolol tartrate 50 MG tablet  Commonly known as: LOPRESSOR   50 mg, Oral, 2 Times Daily      nystatin 050310 UNIT/GM powder  Commonly known as: MYCOSTATIN   Topical, 3 Times Daily, AA         Stop These  Medications    famotidine 20 MG tablet  Commonly known as: PEPCID     furosemide 40 MG tablet  Commonly known as: LASIX     selenium sulfide 1 % lotion  Commonly known as: SELSUN          Discharge medications personally reviewed by me and med rec done by me personally.  01/02/21, 6:13 PM EST        Consults:   Consults     Date and Time Order Name Status Description    1/2/2021 0407 Inpatient Gastroenterology Consult Completed     1/2/2021 0144 Inpatient Hospitalist Consult Completed           Procedures Performed:    Procedure(s) with comments:  ESOPHAGOGASTRODUODENOSCOPY (N/A) - post: esophageal trauma from nasogastric tube, large hiatal hernia        Pertinent Test Results:   Results from last 7 days   Lab Units 01/03/21  1015 01/02/21  0512 01/01/21  2342   WBC 10*3/mm3 8.20  --  9.60   HEMOGLOBIN g/dL 11.2* 11.4* 12.4   HEMATOCRIT % 33.7* 34.8 37.4   MCV fL 95.4  --  92.9   MCH pg 31.6  --  30.9   PLATELETS 10*3/mm3 269  --  323     Results from last 7 days   Lab Units 01/03/21  0224 01/02/21  0512 01/01/21  2342   SODIUM mmol/L 142 142 139   POTASSIUM mmol/L 3.6 4.0 3.9   CHLORIDE mmol/L 105 100 95*   CO2 mmol/L 30.0* 32.0* 34.0*   BUN mg/dL 18 20 19   CREATININE mg/dL 1.42* 1.44* 1.51*   CALCIUM mg/dL 8.9 9.8 9.8   BILIRUBIN mg/dL  --   --  0.3   ALK PHOS U/L  --   --  93   ALT (SGPT) U/L  --   --  9   AST (SGOT) U/L  --   --  14   GLUCOSE mg/dL 104* 187* 186*     Lab Results   Component Value Date    CALCIUM 8.9 01/03/2021     Hemoglobin A1C   Date Value Ref Range Status   01/01/2021 6.4 (H) 3.5 - 5.6 % Final     No results found for: CHOL, CHLPL, TRIG, HDL, LDL, LDLDIRECT  Lab Results   Component Value Date    LIPASE 16 01/01/2021         Pathology  No results found for: INTRAOP, PREDX, FINALDX, COMDX  Inflammatory Biomarkers        Invalid input(s): ESR, D-DIMER QUANTITATIVE,  PROCALCITONIN  COVID19   Date Value Ref Range Status   01/01/2021 Presumptive Negative Presumptive Negative - Ref. Range Final         Microbiology Results (last 10 days)     Procedure Component Value - Date/Time    COVID-19, ABBOTT IN-HOUSE,NASAL Swab (NO TRANSPORT MEDIA) 2 HR TAT - Swab, Nasopharynx [[id number]]  (Normal) Collected: 01/01/21 2342    Lab Status: Final result Specimen: Swab from Nasopharynx Updated: 01/02/21 0007     COVID19 Presumptive Negative    Narrative:      Fact sheet for providers: https://www.fda.gov/media/970188/download     Fact sheet for patients: https://www.fda.gov/media/542009/download    Test performed by PCR.  If inconsistent with clinical signs and symptoms patient should be tested with different authorized molecular test.    Urine Culture - Urine, Urine, Catheter [[id number]]  (Abnormal) Collected: 01/01/21 2338    Lab Status: Preliminary result Specimen: Urine, Catheter Updated: 01/03/21 0908     Urine Culture >100,000 CFU/mL Gram Negative Bacilli          ECG/EMG Results (most recent)     Procedure Component Value Units Date/Time    ECG 12 Lead [[id number]] Collected: 01/01/21 2331     Updated: 01/03/21 0839     QT Interval 388 ms     Narrative:      HEART RATE= 89  bpm  RR Interval= 676  ms  AK Interval= 203  ms  P Horizontal Axis= -3  deg  P Front Axis= 51  deg  QRSD Interval= 84  ms  QT Interval= 388  ms  QRS Axis= -22  deg  T Wave Axis= -15  deg  - ABNORMAL ECG -  Sinus rhythm  Atrial premature complex  Inferior infarct, age indeterminate  No previous ECG available for comparison  Electronically Signed By: Julio Chong (Jakob) 03-Jan-2021 08:37:27  Date and Time of Study: 2021-01-01 23:31:42                    Ct Abdomen Pelvis Without Contrast    Result Date: 1/1/2021  1.  Large paraesophageal hiatal hernia.  The gastric body is distended and fluid-filled due to obstruction at the level of the antrum as it passes through the diaphragm. 2.  Mitral valve annular calcification.  Coronary artery atherosclerosis. 3.  Sigmoid diverticulosis. 4.  Hysterectomy. 5.  Chronic T12 compression fracture.  L3 vertebral  abner. Electronically signed by:  Emile Coelho M.D.  1/1/2021 11:20 PM      Xrays, labs reviewed personally by me.  01/02/21  6:13 PM EST      Condition on Discharge:    Stable    Discharge Diet:   Dietary Orders (From admission, onward)     Start     Ordered    01/02/21 1611  Diet Diabetic/Consistent Carbs; Diabetic - Consistent Carb  Diet Effective Now     Question Answer Comment   Diet / Texture / Consistency Diabetic/Consistent Carbs    Select Type: Diabetic - Consistent Carb        01/02/21 1610                Activity at Discharge:   Activity Instructions     Activity as Tolerated            Follow-up Appointments    No future appointments.    Additional Instructions for the Follow-ups that You Need to Schedule     Discharge Follow-up with PCP   As directed       Currently Documented PCP:    Giovani Mejia MD    PCP Phone Number:    [phone]     Follow Up Details: If no PCP, call MD finder at [phone]           Follow-up Information     Yoan Mendoza MD Follow up in 1 month(s).    Specialties: Gastroenterology, Internal Medicine  Contact information:  [address] [place] [address] [phone]             Giovani Mejia MD .    Specialty: Geriatric Medicine  Contact information:  [address] [place] [address] [phone]                    Test Results Pending at Discharge  Pending Labs     Order Current Status    Urine Culture - Urine, Urine, Catheter Preliminary result           Risk for Readmission (LACE) Score: 3 (1/3/2021  6:00 AM)            Npaoleon Hicks MD  01/03/21  17:54 EST

## 2021-01-02 NOTE — H&P
"      [facility]      Patient Name: Nory Duff  : 1940  MRN: [id number]  Primary Care Physician: Giovani Mejia MD  Date of admission: 2021    Patient Care Team:  Giovani Mejia MD as PCP - General (Geriatric Medicine)          Subjective   History Present Illness     Chief Complaint:   Chief Complaint   Patient presents with   • Vomiting     History of Present Illness  80-year-old  female, nursing home resident with dementia brought to the ED because she had 2 episodes of suspected bloody emesis.  No history could be obtained from the patient because of dementia.  No all medical records available  in our hospital.  No further information provided.  Patient is on aspirin and Plavix for unknown reason.    Emergency department course:  Afebrile, hemodynamically stable, no acute distress.  ED physician stated that during examination patient vomited large amount of coffee-ground emesis, tested heme positive, and stool was dark brown heme positive.  Labs were significant for blood glucose 186, creatinine 1.51 [unknown baseline kidney function], GFR 33, INR 1.19.  CBC was normal including hemoglobin level of 12.4.  Urine analysis showed turbid urine with large leukocyte Estrace, negative nitrite, WBCs too numerous to count with 4+ bacteria.  EKG showed sinus rhythm with occasional PAC.  CT scan of the abdomen and pelvis without contrast reported \"1.  Large paraesophageal hiatal hernia.  The gastric body is distended and fluid-filled due to obstruction at the level of the antrum as it passes through the diaphragm.  2.  Mitral valve annular calcification.  Coronary artery atherosclerosis.  3.  Sigmoid diverticulosis.  4.  Hysterectomy.  5.  Chronic T12 compression fracture.  L3 vertebral plasty \".  Patient was given 1 g of Rocephin and 80 mg IV Protonix.    ROS  Unobtainable because of dementia.  Please refer to HPI.    Personal History     Past " Medical History:   Past Medical History:   Diagnosis Date   • Anemia    • Atherosclerotic heart disease of native coronary artery without angina pectoris    • COPD (chronic obstructive pulmonary disease) (CMS/[facility])    • COVID-19 03/2020   • Dementia (CMS/[facility])    • Diabetes mellitus (CMS/[facility])    • GERD (gastroesophageal reflux disease)    • Gout    • Hyperlipidemia    • Hypertension    • Localized edema    • Parkinson's disease (CMS/[facility])    • Vitamin D deficiency        Surgical History:    History reviewed. No pertinent surgical history.        Family History: family history is not on file. Otherwise pertinent FHx was reviewed and unremarkable.     Social History:        Medications:  Prior to Admission medications    Medication Sig Start Date End Date Taking? Authorizing Provider   acetaminophen (TYLENOL) 325 MG tablet Take 650 mg by mouth Every 6 (Six) Hours As Needed for Mild Pain  or Fever.   Yes Lesa Silverman MD   allopurinol (ZYLOPRIM) 100 MG tablet Take 200 mg by mouth Daily.   Yes Lesa Silverman MD   aspirin 81 MG EC tablet Take 81 mg by mouth Daily.   Yes Lesa Silverman MD   carbidopa-levodopa (SINEMET)  MG per tablet Take 1 tablet by mouth 3 (Three) Times a Day.   Yes Lesa Silverman MD   cholecalciferol (VITAMIN D3) 25 MCG (1000 UT) tablet Take 2,000 Units by mouth Daily.   Yes Lesa Silverman MD   clopidogrel (PLAVIX) 75 MG tablet Take 75 mg by mouth Daily.   Yes Lesa Silverman MD   docusate sodium (COLACE) 100 MG capsule Take 100 mg by mouth 2 (Two) Times a Day.   Yes Lesa Silvermna MD   famotidine (PEPCID) 20 MG tablet Take 20 mg by mouth Every Night.   Yes Lesa Silverman MD   furosemide (LASIX) 40 MG tablet Take 40 mg by mouth Daily.   Yes ProviderLesa MD   Glucagon rDNA, (Glucagon Emergency) 1 MG kit Inject  as directed As Needed.   Yes Lesa Silverman MD   insulin glargine (LANTUS, SEMGLEE) 100 UNIT/ML injection Inject 18  Units under the skin into the appropriate area as directed 2 (Two) Times a Day.   Yes Lesa Silverman MD   magnesium hydroxide (MILK OF MAGNESIA) 400 MG/5ML suspension Take 30 mL by mouth Daily As Needed for Constipation.   Yes Lesa Silverman MD   metoprolol tartrate (LOPRESSOR) 50 MG tablet Take 50 mg by mouth 2 (Two) Times a Day.   Yes Lesa Silverman MD   nystatin (MYCOSTATIN) 031228 UNIT/GM powder Apply  topically to the appropriate area as directed 3 (Three) Times a Day. AA   Yes Lesa Silverman MD   selenium sulfide (SELSUN) 1 % lotion Apply  topically to the appropriate area as directed Every 7 (Seven) Days. Tuesday   Yes Lesa Silverman MD       Allergies:    Allergies   Allergen Reactions   • Codeine Anaphylaxis   • Latex Anaphylaxis   • Namenda [Memantine] Anaphylaxis       Objective   Objective     Vital Signs  Temp:  [97.6 °F (36.4 °C)] 97.6 °F (36.4 °C)  Heart Rate:  [83-90] 83  Resp:  [17-19] 17  BP: (126-168)/(44-89) 126/44  SpO2:  [94 %-99 %] 97 %  on   ;   Device (Oxygen Therapy): room air  Body mass index is 39.11 kg/m².    Physical Exam  General: Morbidly obese, demented and very somnolent, hard to arouse, no acute distress.   Eyes:  Show anicteric sclerae, moist conjunctivae with no lig lag; PERRLA.  HENT:  Normocephalic, atraumatic, moist oral mucosa.  Neck: Short and thick, no bruit, no JVP, no thyroid or lymph node enlargement, trachea central,   Lungs:  Good air entry. Clear to auscultation.   Heart: RRR, no murmur or rub.   Abdomen:  Soft, not tender, not distended, no organomegaly, bowel sounds positive.   Extremities: No leg edema or joint swelling, no calf tenderness, normal range of movement, pedal pulses intact.   Skin: No rash, lesions, or ulcers.  Normal texture and turgor.  Neurology:  Grossly intact.   Psychiatric exam: Pleasant, cooperative, appropriate mood and affect, intact judgment and insight.    Results Review:  I have personally reviewed most  recent lab results and agree with findings, most notably: .    Results from last 7 days   Lab Units 01/01/21  2342   WBC 10*3/mm3 9.60   HEMOGLOBIN g/dL 12.4   HEMATOCRIT % 37.4   PLATELETS 10*3/mm3 323   INR  1.19*     Results from last 7 days   Lab Units 01/01/21  2342   SODIUM mmol/L 139   POTASSIUM mmol/L 3.9   CHLORIDE mmol/L 95*   CO2 mmol/L 34.0*   BUN mg/dL 19   CREATININE mg/dL 1.51*   GLUCOSE mg/dL 186*   CALCIUM mg/dL 9.8   ALT (SGPT) U/L 9   AST (SGOT) U/L 14   TROPONIN T ng/mL <0.010     Estimated Creatinine Clearance: 34.9 mL/min (A) (by C-G formula based on SCr of 1.51 mg/dL (H)).  Brief Urine Lab Results  (Last result in the past 365 days)      Color   Clarity   Blood   Leuk Est   Nitrite   Protein   CREAT   Urine HCG        01/01/21 2338 Yellow Turbid  Comment:  Result checked  Small (1+) Large (3+) Negative 30 mg/dL (1+)               Microbiology Results (last 10 days)     Procedure Component Value - Date/Time    COVID-19, ABBOTT IN-HOUSE,NASAL Swab (NO TRANSPORT MEDIA) 2 HR TAT - Swab, Nasopharynx [[id number]]  (Normal) Collected: 01/01/21 2342    Lab Status: Final result Specimen: Swab from Nasopharynx Updated: 01/02/21 0007     COVID19 Presumptive Negative    Narrative:      Fact sheet for providers: https://www.fda.gov/media/263701/download     Fact sheet for patients: https://www.fda.gov/media/824663/download    Test performed by PCR.  If inconsistent with clinical signs and symptoms patient should be tested with different authorized molecular test.          ECG/EMG Results (most recent)     Procedure Component Value Units Date/Time    ECG 12 Lead [[id number]] Collected: 01/01/21 2331     Updated: 01/01/21 2338     QT Interval 388 ms     Narrative:      HEART RATE= 89  bpm  RR Interval= 676  ms  [place] Interval= 203  ms  P Horizontal Axis= -3  deg  P Front Axis= 51  deg  QRSD Interval= 84  ms  QT Interval= 388  ms  QRS Axis= -22  deg  T Wave Axis= -15  deg  - ABNORMAL ECG -  Sinus rhythm  Atrial  premature complex  Inferior infarct, age indeterminate  No previous ECG available for comparison  Electronically Signed By:   Date and Time of Study: 2021-01-01 23:31:42                    Ct Abdomen Pelvis Without Contrast    Result Date: 1/1/2021  1.  Large paraesophageal hiatal hernia.  The gastric body is distended and fluid-filled due to obstruction at the level of the antrum as it passes through the diaphragm. 2.  Mitral valve annular calcification.  Coronary artery atherosclerosis. 3.  Sigmoid diverticulosis. 4.  Hysterectomy. 5.  Chronic T12 compression fracture.  L3 vertebral plasty. Electronically signed by:  Emile Coelho M.D.  1/1/2021 11:20 PM        Estimated Creatinine Clearance: 34.9 mL/min (A) (by C-G formula based on SCr of 1.51 mg/dL (H)).    Assessment/Plan   Assessment/Plan       Active Hospital Problems    Diagnosis  POA   • Gastrointestinal hemorrhage [K92.2]  Yes      Resolved Hospital Problems   No resolved problems to display.     Assessment:  1.  Acute upper GI bleeding.    2.  Large paraesophageal hernia per radiology.    3.  UTI.    4.  On aspirin and Plavix for unknown reason.    5.  IDDM.    6.  Hypertension.    7.  On allopurinol-possibly for gout.    8.  On Sinemet-possibly for Parkinson disease.    9.  Dementia.    10.  Morbid obesity.    Plan:  -Observation with telemetry.  -Consult gastroenterology.  -N.p.o. except for medication, IVF with normal saline, IV Protonix 40 mg every 12 hours.  -H&H every 8 hours x48 hours.  -Monitor hemodynamic status, renal function, and electrolyte levels.  -Continue empiric antibiotic coverage with Rocephin pending urine culture results.  -Hold aspirin, Plavix, and Lasix.  -Continue patient's home medications.  Glycemic control with basal and correctional insulins..  Check HbA1c level.            VTE Prophylaxis -   Mechanical Order History:      Ordered        Signed and Held  Place Sequential Compression Device  Once         Signed and Held   Maintain Sequential Compression Device  Continuous                 Pharmalogical Order History:     None          CODE STATUS:    Code Status and Medical Interventions:   Ordered at: 01/02/21 0346     Code Status:    CPR     Medical Interventions (Level of Support Prior to Arrest):    Full       This patient has been examined wearing appropriate Personal Protective Equipment and discussed with hospital infection control department. 01/02/21      I discussed the patient's findings and my recommendations with patient and nursing staff.      Signature:Electronically signed by Britney Lehman MD, 01/02/21, 3:59 AM EST.      [facility] Hospitalist Team

## 2021-01-02 NOTE — OP NOTE
ESOPHAGOGASTRODUODENOSCOPY Procedure Report    Patient Name:  Nory Duff  YOB: 1940    Date of Surgery:  1/2/2021     Pre-Op Diagnosis:  Gastrointestinal hemorrhage, unspecified gastrointestinal hemorrhage type [K92.2]       Postop diagnosis:  1.  NG tube trauma in the distal esophagus.  2.  Large hiatal hernia  3.  Food in stomach    Procedure/CPT® Codes:      Procedure(s):  ESOPHAGOGASTRODUODENOSCOPY    Staff:  Surgeon(s):  Yoan Mendoza MD      Anesthesia: Monitored Anesthesia Care    Description of Procedure:  A description of the procedure as well as risks, benefits and alternative methods were explained to the patient who voiced understanding and signed the corresponding consent form. A physical exam was performed and vital signs were monitored throughout the procedure.    An upper GI endoscope was placed into the mouth and proceeded through the esophagus, stomach and second portion of the duodenum without difficulty. The scope was then retroflexed and the fundus was visualized. The procedure was not difficult and there were no immediate complications.  There was no blood loss.    Impression:  1.  A very large hiatal hernia was present with over half of the stomach being a hiatal hernia.  There was vegetable matter that could not be suctioned up in the hiatal hernia limiting the view inside of this location.  There is no bleeding or blood in the entire upper GI tract.  2.  Normal duodenal mucosa visualized to D2  3.  Normal gastric mucosa entire stomach from what could be visualized.  4.  Red circular marks throughout the distal esophagus suggestive of suction marks from NG tube trauma.  These were not actively bleeding and there were no underlying esophageal varices.    Recommendations:  Avoid NG tube  PPI  Antiemetics      Yoan Mendoza MD     Date: 1/2/2021    Time: 12:23 EST

## 2021-01-02 NOTE — ANESTHESIA PREPROCEDURE EVALUATION
Anesthesia Evaluation     Patient summary reviewed and Nursing notes reviewed   NPO Solid Status: > 8 hours  NPO Liquid Status: > 8 hours           Airway   Mallampati: II  TM distance: >3 FB  Neck ROM: full  No difficulty expected  Dental - normal exam     Pulmonary - normal exam   (+) COPD,   Cardiovascular - normal exam  Exercise tolerance: poor (<4 METS)    ECG reviewed    (+) hypertension, CAD, hyperlipidemia,       Neuro/Psych  (+) dementia, poor historian.,     GI/Hepatic/Renal/Endo    (+) obesity,  GI bleeding upper active bleeding, diabetes mellitus,     Musculoskeletal (-) negative ROS    Abdominal  - normal exam    Bowel sounds: normal.   Substance History - negative use     OB/GYN negative ob/gyn ROS         Other                      Anesthesia Plan    ASA 3     MAC   total IV anesthesia  intravenous induction     Anesthetic plan, all risks, benefits, and alternatives have been provided, discussed and informed consent has been obtained with: patient.

## 2021-01-02 NOTE — ED NOTES
Spoke with REESE Og. Received verbal consent for EGD to be performed today by Gastro. Second nurse verification of consent by Karen Elam LPN.      Camila Antony RN  01/02/21 0900

## 2021-01-02 NOTE — ANESTHESIA POSTPROCEDURE EVALUATION
Patient: Nory Duff    Procedure Summary     Date: 01/02/21 Room / Location: [facility] ENDOSCOPY 1 / [facility] ENDOSCOPY    Anesthesia Start: 1205 Anesthesia Stop: 1226    Procedure: ESOPHAGOGASTRODUODENOSCOPY (N/A ) Diagnosis:       Gastrointestinal hemorrhage, unspecified gastrointestinal hemorrhage type      (Gastrointestinal hemorrhage, unspecified gastrointestinal hemorrhage type [K92.2])    Surgeon: Yoan Mendoza MD Provider: Chris Springer MD    Anesthesia Type: MAC ASA Status: 3          Anesthesia Type: MAC    Vitals  Vitals Value Taken Time   /69 01/02/21 1240   Temp     Pulse 76 01/02/21 1240   Resp 20 01/02/21 1240   SpO2 98 % 01/02/21 1240           Post Anesthesia Care and Evaluation    Patient location during evaluation: PACU  Patient participation: complete - patient participated  Level of consciousness: awake  Pain scale: See nurse's notes for pain score.  Pain management: adequate  Airway patency: patent  Anesthetic complications: No anesthetic complications  PONV Status: none  Cardiovascular status: acceptable  Respiratory status: acceptable  Hydration status: acceptable    Comments: Patient seen and examined postoperatively; vital signs stable; SpO2 greater than or equal to 90%; cardiopulmonary status stable; nausea/vomiting adequately controlled; pain adequately controlled; no apparent anesthesia complications; patient discharged from anesthesia care when discharge criteria were met

## 2021-01-03 ENCOUNTER — ON CAMPUS - OUTPATIENT (OUTPATIENT)
Dept: URBAN - METROPOLITAN AREA HOSPITAL 85 | Facility: HOSPITAL | Age: 81
End: 2021-01-03
Payer: MEDICARE

## 2021-01-03 VITALS
OXYGEN SATURATION: 96 % | HEIGHT: 65 IN | HEART RATE: 56 BPM | BODY MASS INDEX: 39.15 KG/M2 | WEIGHT: 235 LBS | RESPIRATION RATE: 17 BRPM | SYSTOLIC BLOOD PRESSURE: 120 MMHG | DIASTOLIC BLOOD PRESSURE: 40 MMHG | TEMPERATURE: 97.3 F

## 2021-01-03 DIAGNOSIS — K44.9 DIAPHRAGMATIC HERNIA WITHOUT OBSTRUCTION OR GANGRENE: ICD-10-CM

## 2021-01-03 DIAGNOSIS — K94.29 OTHER COMPLICATIONS OF GASTROSTOMY: ICD-10-CM

## 2021-01-03 DIAGNOSIS — K21.9 GASTRO-ESOPHAGEAL REFLUX DISEASE WITHOUT ESOPHAGITIS: ICD-10-CM

## 2021-01-03 DIAGNOSIS — N39.0 URINARY TRACT INFECTION, SITE NOT SPECIFIED: ICD-10-CM

## 2021-01-03 DIAGNOSIS — D50.9 IRON DEFICIENCY ANEMIA, UNSPECIFIED: ICD-10-CM

## 2021-01-03 DIAGNOSIS — K92.0 HEMATEMESIS: ICD-10-CM

## 2021-01-03 LAB
ANION GAP SERPL CALCULATED.3IONS-SCNC: 7 MMOL/L (ref 5–15)
BASOPHILS # BLD AUTO: 0.1 10*3/MM3 (ref 0–0.2)
BASOPHILS NFR BLD AUTO: 0.8 % (ref 0–1.5)
BUN SERPL-MCNC: 18 MG/DL (ref 8–23)
BUN/CREAT SERPL: 12.7 (ref 7–25)
CALCIUM SPEC-SCNC: 8.9 MG/DL (ref 8.6–10.5)
CHLORIDE SERPL-SCNC: 105 MMOL/L (ref 98–107)
CO2 SERPL-SCNC: 30 MMOL/L (ref 22–29)
CREAT SERPL-MCNC: 1.42 MG/DL (ref 0.57–1)
DEPRECATED RDW RBC AUTO: 49.4 FL (ref 37–54)
EOSINOPHIL # BLD AUTO: 0.5 10*3/MM3 (ref 0–0.4)
EOSINOPHIL NFR BLD AUTO: 6.2 % (ref 0.3–6.2)
ERYTHROCYTE [DISTWIDTH] IN BLOOD BY AUTOMATED COUNT: 14.9 % (ref 12.3–15.4)
GFR SERPL CREATININE-BSD FRML MDRD: 35 ML/MIN/1.73
GLUCOSE BLDC GLUCOMTR-MCNC: 101 MG/DL (ref 70–105)
GLUCOSE BLDC GLUCOMTR-MCNC: 93 MG/DL (ref 70–105)
GLUCOSE SERPL-MCNC: 104 MG/DL (ref 65–99)
HCT VFR BLD AUTO: 33.7 % (ref 34–46.6)
HGB BLD-MCNC: 11.2 G/DL (ref 12–15.9)
LYMPHOCYTES # BLD AUTO: 1.3 10*3/MM3 (ref 0.7–3.1)
LYMPHOCYTES NFR BLD AUTO: 16 % (ref 19.6–45.3)
MCH RBC QN AUTO: 31.6 PG (ref 26.6–33)
MCHC RBC AUTO-ENTMCNC: 33.2 G/DL (ref 31.5–35.7)
MCV RBC AUTO: 95.4 FL (ref 79–97)
MONOCYTES # BLD AUTO: 0.8 10*3/MM3 (ref 0.1–0.9)
MONOCYTES NFR BLD AUTO: 9.6 % (ref 5–12)
NEUTROPHILS NFR BLD AUTO: 5.5 10*3/MM3 (ref 1.7–7)
NEUTROPHILS NFR BLD AUTO: 67.4 % (ref 42.7–76)
NRBC BLD AUTO-RTO: 0 /100 WBC (ref 0–0.2)
PLATELET # BLD AUTO: 269 10*3/MM3 (ref 140–450)
PMV BLD AUTO: 8.2 FL (ref 6–12)
POTASSIUM SERPL-SCNC: 3.6 MMOL/L (ref 3.5–5.2)
QT INTERVAL: 388 MS
RBC # BLD AUTO: 3.53 10*6/MM3 (ref 3.77–5.28)
SODIUM SERPL-SCNC: 142 MMOL/L (ref 136–145)
WBC # BLD AUTO: 8.2 10*3/MM3 (ref 3.4–10.8)

## 2021-01-03 PROCEDURE — 63710000001 CARBIDOPA-LEVODOPA 25-100 MG TABLET: Performed by: INTERNAL MEDICINE

## 2021-01-03 PROCEDURE — 80048 BASIC METABOLIC PNL TOTAL CA: CPT | Performed by: INTERNAL MEDICINE

## 2021-01-03 PROCEDURE — A9270 NON-COVERED ITEM OR SERVICE: HCPCS | Performed by: INTERNAL MEDICINE

## 2021-01-03 PROCEDURE — 63710000001 FOSFOMYCIN 3 G PACK: Performed by: INTERNAL MEDICINE

## 2021-01-03 PROCEDURE — 82962 GLUCOSE BLOOD TEST: CPT

## 2021-01-03 PROCEDURE — 63710000001 PANTOPRAZOLE 40 MG TABLET DELAYED-RELEASE: Performed by: INTERNAL MEDICINE

## 2021-01-03 PROCEDURE — 25010000002 CEFTRIAXONE PER 250 MG: Performed by: INTERNAL MEDICINE

## 2021-01-03 PROCEDURE — 63710000001 METOPROLOL TARTRATE 50 MG TABLET: Performed by: INTERNAL MEDICINE

## 2021-01-03 PROCEDURE — 85025 COMPLETE CBC W/AUTO DIFF WBC: CPT | Performed by: NURSE PRACTITIONER

## 2021-01-03 PROCEDURE — 99232 SBSQ HOSP IP/OBS MODERATE 35: CPT | Performed by: NURSE PRACTITIONER

## 2021-01-03 PROCEDURE — 63710000001 ALLOPURINOL 100 MG TABLET: Performed by: INTERNAL MEDICINE

## 2021-01-03 PROCEDURE — 99217 PR OBSERVATION CARE DISCHARGE MANAGEMENT: CPT | Performed by: INTERNAL MEDICINE

## 2021-01-03 PROCEDURE — 63710000001 DOCUSATE SODIUM 100 MG CAPSULE: Performed by: INTERNAL MEDICINE

## 2021-01-03 PROCEDURE — G0378 HOSPITAL OBSERVATION PER HR: HCPCS

## 2021-01-03 PROCEDURE — 63710000001 CHOLECALCIFEROL 25 MCG (1000 UT) TABLET: Performed by: INTERNAL MEDICINE

## 2021-01-03 RX ORDER — GRANULES FOR ORAL 3 G/1
3 POWDER ORAL ONCE
Status: COMPLETED | OUTPATIENT
Start: 2021-01-03 | End: 2021-01-03

## 2021-01-03 RX ORDER — PANTOPRAZOLE SODIUM 40 MG/1
40 TABLET, DELAYED RELEASE ORAL DAILY
Start: 2021-01-03

## 2021-01-03 RX ADMIN — NYSTATIN: 100000 POWDER TOPICAL at 09:00

## 2021-01-03 RX ADMIN — Medication 10 ML: at 09:37

## 2021-01-03 RX ADMIN — PANTOPRAZOLE SODIUM 40 MG: 40 TABLET, DELAYED RELEASE ORAL at 06:27

## 2021-01-03 RX ADMIN — NYSTATIN: 100000 POWDER TOPICAL at 16:56

## 2021-01-03 RX ADMIN — Medication 2000 UNITS: at 09:36

## 2021-01-03 RX ADMIN — FOSFOMYCIN TROMETHAMINE 3 G: 3 POWDER ORAL at 16:53

## 2021-01-03 RX ADMIN — CARBIDOPA AND LEVODOPA 1 TABLET: 25; 100 TABLET ORAL at 16:10

## 2021-01-03 RX ADMIN — CARBIDOPA AND LEVODOPA 1 TABLET: 25; 100 TABLET ORAL at 09:36

## 2021-01-03 RX ADMIN — ALLOPURINOL 200 MG: 100 TABLET ORAL at 09:37

## 2021-01-03 RX ADMIN — METOPROLOL TARTRATE 50 MG: 50 TABLET, FILM COATED ORAL at 09:36

## 2021-01-03 RX ADMIN — CEFTRIAXONE SODIUM 1 G: 1 INJECTION, POWDER, FOR SOLUTION INTRAMUSCULAR; INTRAVENOUS at 00:01

## 2021-01-03 RX ADMIN — SODIUM CHLORIDE 100 ML/HR: 9 INJECTION, SOLUTION INTRAVENOUS at 11:25

## 2021-01-03 RX ADMIN — DOCUSATE SODIUM 100 MG: 100 CAPSULE, LIQUID FILLED ORAL at 09:37

## 2021-01-03 NOTE — PROGRESS NOTES
Discharge Planning Assessment   Malcolm     Patient Name: Nory Duff  MRN: [id number]  Today's Date: 1/3/2021    Admit Date: 1/1/2021      Discharge Plan     Row Name 01/03/21 1110       Plan    Plan  return to [facility]    Plan Comments  MSW confirmed with POA and facility Liaison that Pt can return to [facility] today, if medically stable. Please inform Pt/family of transfer.        Continued Care and Services - Admitted Since 1/1/2021     Destination     Service Provider Request Status Selected Services Address Phone Fax Patient Preferred    [facility]  Accepted N/A [address] [place] [id number] [phone] --              Jaz DE LA CRUZ, [facility]  Weekend   Care Management Dept  Cell [phone]  Weekday Department [phone]

## 2021-01-03 NOTE — PLAN OF CARE
Goal Outcome Evaluation:  Plan of Care Reviewed With: patient      Pt resting. Pt refused dinner. Nurse held nightly insulin. Nurse spoke with daughter over the phone at the beginning of the shift. No change in pt care. Will know more in AM when MD rounds. Pt will return back to [facility].

## 2021-01-03 NOTE — PROGRESS NOTES
" LOS: 0 days   Patient Care Team:  Giovani Mejia MD as PCP - General (Geriatric Medicine)      Subjective   \"I don't want to be sick\"    Interval History:   1/2/2021 EGD NG tube trauma in the distal esophagus, large hiatal hernia.  Food in stomach.    Pending AM CBC.  Creatinine 1.42 improved.   Bedside RN reports no nausea or vomiting.  Patient has only had a smear of the bowel movement that was dark brown.      ROS:   Denies  No chest pain, shortness of breath, or cough.        Medication Review:     Current Facility-Administered Medications:   •  acetaminophen (TYLENOL) tablet 650 mg, 650 mg, Oral, Q6H PRN, Yoan Mendoza MD  •  allopurinol (ZYLOPRIM) tablet 200 mg, 200 mg, Oral, Daily, Yoan Mendoza MD, 200 mg at 01/03/21 0937  •  carbidopa-levodopa (SINEMET)  MG per tablet 1 tablet, 1 tablet, Oral, TID, Yoan Mendoza MD, 1 tablet at 01/03/21 0936  •  cefTRIAXone (ROCEPHIN) 1 g in sodium chloride 0.9 % 100 mL IVPB, 1 g, Intravenous, Q24H, Yoan Mendoza MD, Stopped at 01/03/21 0707  •  cholecalciferol (VITAMIN D3) tablet 2,000 Units, 2,000 Units, Oral, Daily, Yoan Mendoza MD, 2,000 Units at 01/03/21 0936  •  dextrose (D50W) 25 g/ 50mL Intravenous Solution 25 g, 25 g, Intravenous, Q15 Min PRN, Yoan Mendoza MD  •  dextrose (GLUTOSE) oral gel 15 g, 15 g, Oral, Q15 Min PRN, Yoan Mendoza MD  •  docusate sodium (COLACE) capsule 100 mg, 100 mg, Oral, BID, Yoan Mendoza MD, 100 mg at 01/03/21 0937  •  glucagon (human recombinant) (GLUCAGEN DIAGNOSTIC) injection 1 mg, 1 mg, Subcutaneous, Q15 Min PRN, Yoan Mendoza MD  •  insulin glargine (LANTUS, SEMGLEE) injection 18 Units, 18 Units, Subcutaneous, BID, Yoan Mendoza MD, 18 Units at 01/02/21 0949  •  insulin lispro (humaLOG, ADMELOG) injection 0-9 Units, 0-9 Units, Subcutaneous, TID AC, 2 Units at 01/02/21 0641 **AND** insulin lispro (humaLOG, ADMELOG) injection 0-9 Units, 0-9 Units, Subcutaneous, PRN, Yoan Mendoza MD  •  " metoprolol tartrate (LOPRESSOR) tablet 50 mg, 50 mg, Oral, BID, Yoan Mendoza MD, 50 mg at 01/03/21 0936  •  midazolam (VERSED) injection 1 mg, 1 mg, Intravenous, Q10 Min PRN, Phil Arrieta MD  •  nystatin (MYCOSTATIN) powder, , Topical, TID, Yoan Mendoza MD, Given at 01/02/21 2147  •  ondansetron (ZOFRAN) tablet 4 mg, 4 mg, Oral, Q6H PRN **OR** ondansetron (ZOFRAN) injection 4 mg, 4 mg, Intravenous, Q6H PRN, Chris Springer MD  •  pantoprazole (PROTONIX) EC tablet 40 mg, 40 mg, Oral, Q AM, Napoleon Hicks MD, 40 mg at 01/03/21 0627  •  sodium chloride 0.9 % flush 10 mL, 10 mL, Intravenous, Q12H, Yoan Mendoza MD, 10 mL at 01/03/21 0937  •  sodium chloride 0.9 % flush 10 mL, 10 mL, Intravenous, PRN, Yoan Mendoza MD  •  sodium chloride 0.9 % flush 10 mL, 10 mL, Intravenous, Q12H, Phil Arrieta MD, 10 mL at 01/03/21 0937  •  sodium chloride 0.9 % flush 10 mL, 10 mL, Intravenous, PRN, Phil Arrieta MD  •  sodium chloride 0.9 % infusion, 100 mL/hr, Intravenous, Continuous, Yoan Mendoza MD, Last Rate: 100 mL/hr at 01/02/21 2147, 100 mL/hr at 01/02/21 2147      Objective Resting in hospital bed.    Vital Signs  Temp:  [97.4 °F (36.3 °C)-99.1 °F (37.3 °C)] 97.4 °F (36.3 °C)  Heart Rate:  [56-78] 56  Resp:  [12-21] 14  BP: (108-168)/() 144/62  Physical Exam:    General Appearance:    Awake and alert, in no acute distress   Head:    Normocephalic, without obvious abnormality   Eyes:          Conjunctivae normal, anicteric sclera   Ears:    Hearing intact   Throat:   No oral lesions, no thrush, oral mucosa moist   Neck:   No adenopathy, supple, no JVD   Lungs:     Clear to auscultation bilaterally, respirations regular, even and unlabored    Heart:    Regular rhythm and normal rate, normal S1 and S2, no            murmur, no gallop, no rub   Abdomen:     Normal bowel sounds, soft, non-tender, no rebound or guarding, non-distended, no hepatosplenomegaly   Rectal:     Deferred    Extremities:   2-3+ edema, no cyanosis, no redness   Skin:   No bleeding, bruising or rash, no jaundice   Neurologic:   Cranial nerves 2 - 12 grossly intact, no asterixis, sensation   intact        Results Review:    Lab Results (last 24 hours)     Procedure Component Value Units Date/Time    Urine Culture - Urine, Urine, Catheter [[id number]]  (Abnormal) Collected: 01/01/21 2338    Specimen: Urine, Catheter Updated: 01/03/21 0908     Urine Culture >100,000 CFU/mL Gram Negative Bacilli    POC Glucose Once [[id number]]  (Normal) Collected: 01/03/21 0810    Specimen: Blood Updated: 01/03/21 0811     Glucose 93 mg/dL      Comment: Serial Number: [id number]:  [id number]       Basic Metabolic Panel [[id number]]  (Abnormal) Collected: 01/03/21 0224    Specimen: Blood Updated: 01/03/21 0306     Glucose 104 mg/dL      BUN 18 mg/dL      Creatinine 1.42 mg/dL      Sodium 142 mmol/L      Potassium 3.6 mmol/L      Chloride 105 mmol/L      CO2 30.0 mmol/L      Calcium 8.9 mg/dL      eGFR Non African Amer 35 mL/min/1.73      BUN/Creatinine Ratio 12.7     Anion Gap 7.0 mmol/L     Narrative:      GFR Normal >60  Chronic Kidney Disease <60  Kidney Failure <15      POC Glucose Once [[id number]]  (Normal) Collected: 01/02/21 2115    Specimen: Blood Updated: 01/02/21 2116     Glucose 80 mg/dL      Comment: Serial Number: [id number]:  [id number]       POC Glucose Once [[id number]]  (Normal) Collected: 01/02/21 1742    Specimen: Blood Updated: 01/02/21 1743     Glucose 78 mg/dL      Comment: Serial Number: [id number]:  [id number]       Hemoglobin A1c [[id number]]  (Abnormal) Collected: 01/01/21 2342    Specimen: Blood from Arm, Right Updated: 01/02/21 1555     Hemoglobin A1C 6.4 %     Narrative:      Hemoglobin A1C Reference Range:    <5.7 %        Normal  5.7-6.4 %     Increased risk for diabetes  > 6.4 %        Diabetes       These guidelines have been recommended by the American Diabetic Association for Hgb A1c.      The  following 2010 guidelines have been recommended by the American Diabetes Association for Hemoglobin A1c.    HBA1c 5.7-6.4% Increased risk for future diabetes (pre-diabetes)  HBA1c     >6.4% Diabetes      Iron Profile [[id number]]  (Abnormal) Collected: 01/02/21 0512    Specimen: Blood Updated: 01/02/21 1321     Iron 60 mcg/dL      Iron Saturation 21 %      Transferrin 193 mg/dL      TIBC 288 mcg/dL           Imaging Results (Last 24 Hours)     ** No results found for the last 24 hours. **            Assessment/Plan     Coffee ground emesis-EGD showed NG trauma   Iron deficiency anemia  Dementia  CVA on aspirin and Plavix  Diabetes  GERD  UTI    1/2/2021 EGD (Dr. Mendoza) NG tube trauma in the distal esophagus, large hiatal hernia.  Food in stomach.     PLAN:  EGD did not show any signs of coffee-ground emesis or acute GI bleed.  Continue PPI. Antiemetics as needed  Pending CBC  Tolerating diabetic diet  As long as CBC is okay patient should be able to return to nursing home facility.      Alix Jimenez, APRN  01/03/21  10:04 EST

## 2021-01-03 NOTE — DISCHARGE PLACEMENT REQUEST
"Nory Mckenzie (80 y.o. Female)     Date of Birth Social Security Number Address Home Phone MRN    1940  [address]  [place] [address] [phone] [id number]    Orthodoxy Marital Status          Yarsani        Admission Date Admission Type Admitting Provider Attending Provider Department, Room/Bed    1/1/21 Emergency Napoleon Hicks MD Gill, Ravi, MD [facility] SURGICAL INPATIENT, 4119/1    Discharge Date Discharge Disposition Discharge Destination                       Attending Provider: Napoleon Hicks MD    Allergies: Codeine, Latex, Namenda [Memantine]    Isolation: None   Infection: COVID (rule out) (01/01/21)   Code Status: CPR    Ht: 165.1 cm (65\")   Wt: 107 kg (235 lb)    Admission Cmt: None   Principal Problem: Hiatal hernia [K44.9]                 Active Insurance as of 1/1/2021     Primary Coverage     Payor Plan Insurance Group Employer/Plan Group    MEDICARE MEDICARE A & B      Payor Plan Address Payor Plan Phone Number Payor Plan Fax Number Effective Dates    [address] [phone]  1/1/2021 - 1/1/2021    [facility] [id number]       Subscriber Name Subscriber Birth Date Member ID       NORY MCKENZIE 1940 [id number]           Secondary Coverage     Payor Plan Insurance Group Employer/Plan Group    UNITED HEALTHCARE MEDICARE REPLACEMENT UNITED HEALTHCARE MEDICARE REPLACEMENT [id number]     Payor Plan Address Payor Plan Phone Number Payor Plan Fax Number Effective Dates    [address] [address]   1/1/2021 - None Entered    [facility] [id number]       Subscriber Name Subscriber Birth Date Member ID       NORY MCKENZIE 1940 [id number]           Tertiary Coverage     Payor Plan Insurance Group Employer/Plan Group    [place]      Payor Plan Address Payor Plan Phone Number Payor Plan Fax Number Effective Dates    [address]   1/1/2020 - None Entered    [place] [address]       Subscriber Name Subscriber Birth Date Member ID       " "NORY MCKENZIE 1940 [id number]                 Emergency Contacts      (Rel.) Home Phone Work Phone Mobile Phone    EVERT OLIVEIRA (Power of ) -- -- [phone]               History & Physical      Britney Lehman MD at 21 0346                [facility] Medicine Services      Patient Name: Nory Mckenzie  : 1940  MRN: [id number]  Primary Care Physician: Giovani Mejia MD  Date of admission: 2021    Patient Care Team:  Giovani Mejia MD as PCP - General (Geriatric Medicine)          Subjective   History Present Illness     Chief Complaint:   Chief Complaint   Patient presents with   • Vomiting     History of Present Illness  80-year-old  female, nursing home resident with dementia brought to the ED because she had 2 episodes of suspected bloody emesis.  No history could be obtained from the patient because of dementia.  No all medical records available  in our hospital.  No further information provided.  Patient is on aspirin and Plavix for unknown reason.    Emergency department course:  Afebrile, hemodynamically stable, no acute distress.  ED physician stated that during examination patient vomited large amount of coffee-ground emesis, tested heme positive, and stool was dark brown heme positive.  Labs were significant for blood glucose 186, creatinine 1.51 [unknown baseline kidney function], GFR 33, INR 1.19.  CBC was normal including hemoglobin level of 12.4.  Urine analysis showed turbid urine with large leukocyte Estrace, negative nitrite, WBCs too numerous to count with 4+ bacteria.  EKG showed sinus rhythm with occasional PAC.  CT scan of the abdomen and pelvis without contrast reported \"1.  Large paraesophageal hiatal hernia.  The gastric body is distended and fluid-filled due to obstruction at the level of the antrum as it passes through the diaphragm.  2.  Mitral valve annular calcification.  Coronary artery " "atherosclerosis.  3.  Sigmoid diverticulosis.  4.  Hysterectomy.  5.  Chronic T12 compression fracture.  L3 vertebral plasty \".  Patient was given 1 g of Rocephin and 80 mg IV Protonix.    ROS  Unobtainable because of dementia.  Please refer to HPI.    Personal History     Past Medical History:   Past Medical History:   Diagnosis Date   • Anemia    • Atherosclerotic heart disease of native coronary artery without angina pectoris    • COPD (chronic obstructive pulmonary disease) (CMS/[facility])    • COVID-19 03/2020   • Dementia (CMS/[facility])    • Diabetes mellitus (CMS/[facility])    • GERD (gastroesophageal reflux disease)    • Gout    • Hyperlipidemia    • Hypertension    • Localized edema    • Parkinson's disease (CMS/[facility])    • Vitamin D deficiency        Surgical History:    History reviewed. No pertinent surgical history.        Family History: family history is not on file. Otherwise pertinent FHx was reviewed and unremarkable.     Social History:        Medications:  Prior to Admission medications    Medication Sig Start Date End Date Taking? Authorizing Provider   acetaminophen (TYLENOL) 325 MG tablet Take 650 mg by mouth Every 6 (Six) Hours As Needed for Mild Pain  or Fever.   Yes Lesa Silverman MD   allopurinol (ZYLOPRIM) 100 MG tablet Take 200 mg by mouth Daily.   Yes Lesa Silverman MD   aspirin 81 MG EC tablet Take 81 mg by mouth Daily.   Yes Lesa Silverman MD   carbidopa-levodopa (SINEMET)  MG per tablet Take 1 tablet by mouth 3 (Three) Times a Day.   Yes Lesa Silverman MD   cholecalciferol (VITAMIN D3) 25 MCG (1000 UT) tablet Take 2,000 Units by mouth Daily.   Yes Lesa Silverman MD   clopidogrel (PLAVIX) 75 MG tablet Take 75 mg by mouth Daily.   Yes Lesa Silverman MD   docusate sodium (COLACE) 100 MG capsule Take 100 mg by mouth 2 (Two) Times a Day.   Yes Lesa Silverman MD   famotidine (PEPCID) 20 MG tablet Take 20 mg by mouth Every Night.   Yes Provider, " MD Lesa   furosemide (LASIX) 40 MG tablet Take 40 mg by mouth Daily.   Yes ProviderLesa MD   Glucagon, rDNA, (Glucagon Emergency) 1 MG kit Inject  as directed As Needed.   Yes Lesa Silverman MD   insulin glargine (LANTUS, SEMGLEE) 100 UNIT/ML injection Inject 18 Units under the skin into the appropriate area as directed 2 (Two) Times a Day.   Yes Lesa Silverman MD   magnesium hydroxide (MILK OF MAGNESIA) 400 MG/5ML suspension Take 30 mL by mouth Daily As Needed for Constipation.   Yes Lesa Silverman MD   metoprolol tartrate (LOPRESSOR) 50 MG tablet Take 50 mg by mouth 2 (Two) Times a Day.   Yes Lesa Silverman MD   nystatin (MYCOSTATIN) 791968 UNIT/GM powder Apply  topically to the appropriate area as directed 3 (Three) Times a Day. AA   Yes Lesa Silverman MD   selenium sulfide (SELSUN) 1 % lotion Apply  topically to the appropriate area as directed Every 7 (Seven) Days. Tuesday   Yes Lesa Silverman MD       Allergies:    Allergies   Allergen Reactions   • Codeine Anaphylaxis   • Latex Anaphylaxis   • Namenda [Memantine] Anaphylaxis       Objective   Objective     Vital Signs  Temp:  [97.6 °F (36.4 °C)] 97.6 °F (36.4 °C)  Heart Rate:  [83-90] 83  Resp:  [17-19] 17  BP: (126-168)/(44-89) 126/44  SpO2:  [94 %-99 %] 97 %  on   ;   Device (Oxygen Therapy): room air  Body mass index is 39.11 kg/m².    Physical Exam  General: Morbidly obese, demented and very somnolent, hard to arouse, no acute distress.   Eyes:  Show anicteric sclerae, moist conjunctivae with no lig lag; PERRLA.  HENT:  Normocephalic, atraumatic, moist oral mucosa.  Neck: Short and thick, no bruit, no JVP, no thyroid or lymph node enlargement, trachea central,   Lungs:  Good air entry. Clear to auscultation.   Heart: RRR, no murmur or rub.   Abdomen:  Soft, not tender, not distended, no organomegaly, bowel sounds positive.   Extremities: No leg edema or joint swelling, no calf tenderness, normal  range of movement, pedal pulses intact.   Skin: No rash, lesions, or ulcers.  Normal texture and turgor.  Neurology:  Grossly intact.   Psychiatric exam: Pleasant, cooperative, appropriate mood and affect, intact judgment and insight.    Results Review:  I have personally reviewed most recent lab results and agree with findings, most notably: .    Results from last 7 days   Lab Units 01/01/21  2342   WBC 10*3/mm3 9.60   HEMOGLOBIN g/dL 12.4   HEMATOCRIT % 37.4   PLATELETS 10*3/mm3 323   INR  1.19*     Results from last 7 days   Lab Units 01/01/21  2342   SODIUM mmol/L 139   POTASSIUM mmol/L 3.9   CHLORIDE mmol/L 95*   CO2 mmol/L 34.0*   BUN mg/dL 19   CREATININE mg/dL 1.51*   GLUCOSE mg/dL 186*   CALCIUM mg/dL 9.8   ALT (SGPT) U/L 9   AST (SGOT) U/L 14   TROPONIN T ng/mL <0.010     Estimated Creatinine Clearance: 34.9 mL/min (A) (by C-G formula based on SCr of 1.51 mg/dL (H)).  Brief Urine Lab Results  (Last result in the past 365 days)      Color   Clarity   Blood   Leuk Est   Nitrite   Protein   CREAT   Urine HCG        01/01/21 2338 Yellow Turbid  Comment:  Result checked  Small (1+) Large (3+) Negative 30 mg/dL (1+)               Microbiology Results (last 10 days)     Procedure Component Value - Date/Time    COVID-19, ABBOTT IN-HOUSE,NASAL Swab (NO TRANSPORT MEDIA) 2 HR TAT - Swab, Nasopharynx [[id number]]  (Normal) Collected: 01/01/21 2342    Lab Status: Final result Specimen: Swab from Nasopharynx Updated: 01/02/21 0007     COVID19 Presumptive Negative    Narrative:      Fact sheet for providers: https://www.fda.gov/media/069803/download     Fact sheet for patients: https://www.fda.gov/media/055684/download    Test performed by PCR.  If inconsistent with clinical signs and symptoms patient should be tested with different authorized molecular test.          ECG/EMG Results (most recent)     Procedure Component Value Units Date/Time    ECG 12 Lead [[id number]] Collected: 01/01/21 2331     Updated: 01/01/21 2338      QT Interval 388 ms     Narrative:      HEART RATE= 89  bpm  RR Interval= 676  ms  NC Interval= 203  ms  P Horizontal Axis= -3  deg  P Front Axis= 51  deg  QRSD Interval= 84  ms  QT Interval= 388  ms  QRS Axis= -22  deg  T Wave Axis= -15  deg  - ABNORMAL ECG -  Sinus rhythm  Atrial premature complex  Inferior infarct, age indeterminate  No previous ECG available for comparison  Electronically Signed By:   Date and Time of Study: 2021-01-01 23:31:42                    Ct Abdomen Pelvis Without Contrast    Result Date: 1/1/2021  1.  Large paraesophageal hiatal hernia.  The gastric body is distended and fluid-filled due to obstruction at the level of the antrum as it passes through the diaphragm. 2.  Mitral valve annular calcification.  Coronary artery atherosclerosis. 3.  Sigmoid diverticulosis. 4.  Hysterectomy. 5.  Chronic T12 compression fracture.  L3 vertebral plasty. Electronically signed by:  Emile Coelho M.D.  1/1/2021 11:20 PM        Estimated Creatinine Clearance: 34.9 mL/min (A) (by C-G formula based on SCr of 1.51 mg/dL (H)).    Assessment/Plan   Assessment/Plan       Active Hospital Problems    Diagnosis  POA   • Gastrointestinal hemorrhage [K92.2]  Yes      Resolved Hospital Problems   No resolved problems to display.     Assessment:  1.  Acute upper GI bleeding.    2.  Large paraesophageal hernia per radiology.    3.  UTI.    4.  On aspirin and Plavix for unknown reason.    5.  IDDM.    6.  Hypertension.    7.  On allopurinol-possibly for gout.    8.  On Sinemet-possibly for Parkinson disease.    9.  Dementia.    10.  Morbid obesity.    Plan:  -Observation with telemetry.  -Consult gastroenterology.  -N.p.o. except for medication, IVF with normal saline, IV Protonix 40 mg every 12 hours.  -H&H every 8 hours x48 hours.  -Monitor hemodynamic status, renal function, and electrolyte levels.  -Continue empiric antibiotic coverage with Rocephin pending urine culture results.  -Hold aspirin, Plavix, and  Lasix.  -Continue patient's home medications.  Glycemic control with basal and correctional insulins..  Check HbA1c level.            VTE Prophylaxis -   Mechanical Order History:      Ordered        Signed and Held  Place Sequential Compression Device  Once         Signed and Held  Maintain Sequential Compression Device  Continuous                 Pharmalogical Order History:     None          CODE STATUS:    Code Status and Medical Interventions:   Ordered at: 01/02/21 0346     Code Status:    CPR     Medical Interventions (Level of Support Prior to Arrest):    Full       This patient has been examined wearing appropriate Personal Protective Equipment and discussed with hospital infection control department. 01/02/21      I discussed the patient's findings and my recommendations with patient and nursing staff.      Signature:Electronically signed by Britney Lehman MD, 01/02/21, 3:59 AM EST.      [facility] Hospitalist Team    Electronically signed by Britney Lehman MD at 01/02/21 0400         Physician Progress Notes (last 48 hours) (Notes from 01/01/21 1018 through 01/03/21 1018)    No notes of this type exist for this encounter.          Consult Notes (last 48 hours) (Notes from 01/01/21 1018 through 01/03/21 1018)      Alix Jimenez, MAE at 01/02/21 0742      Consult Orders    1. Inpatient Gastroenterology Consult [[id number]] ordered by Britney Lehman MD at 01/02/21 0346          Attestation signed by Yoan Mendoza MD at 01/02/21 1227    I have reviewed this documentation and agree.  80-year-old female with dementia presented with coffee-ground emesis from the nursing home.  Hemoglobin 11.4.  Is on aspirin and Plavix for CVA.  Physical exam  Abdomen-soft, nontender, nondistended  Patient is demented  Otherwise benign physical exam  Labs-hemoglobin 11.4  Assessment plan  Coffee-ground emesis-proceed to EGD, PPI, n.p.o.  Has NG tube already in to intermittent low wall suction.  Iron deficiency anemia-we will  "complete anemia work-up and give IV iron if confirmed to be low.                    Patient Care Team:  Giovani Mejia MD as PCP - General (Geriatric Medicine)    Chief complaint: Coffee-ground emesis    Subjective  \"how did I get here?\"    History of present illness:    Patient is a 80-year-old female with a history of iron deficiency anemia, dementia, Covid in March 2020, CVA on aspirin and Plavix, diabetes, GERD, hypertension and Parkinson's disease who was brought to the ER from long-term [facility] facility on 1/1/2020 with coffee-ground emesis.  Patient's hemoglobin is 11.4.  CT reveals a large paraesophageal hernia otherwise negative.  Patient is confused & can not provide any history.    Endo History:  2014 EGD (Dr. Rubin) moderate hiatal hernia.  Minimal prepyloric inflammation.  5/2012 EGD - moderate hiatal hernia, reflux changes  8/2011 Colonoscopy - diverticulosis     Past Medical History:  Past Medical History:   Diagnosis Date   • Anemia    • Atherosclerotic heart disease of native coronary artery without angina pectoris    • COPD (chronic obstructive pulmonary disease) (CMS/HCC)    • COVID-19 03/2020   • Dementia (CMS/HCC)    • Diabetes mellitus (CMS/HCC)    • GERD (gastroesophageal reflux disease)    • Gout    • Hyperlipidemia    • Hypertension    • Localized edema    • Parkinson's disease (CMS/HCC)    • Vitamin D deficiency        Past Surgical History:  History reviewed. No pertinent surgical history.    Social History:  Social History     Tobacco Use   • Smoking status: Not on file   Substance Use Topics   • Alcohol use: Not on file   • Drug use: Not on file       Family History:  History reviewed. No pertinent family history.    Medications:  (Not in a hospital admission)      Scheduled Meds:allopurinol, 200 mg, Oral, Daily  carbidopa-levodopa, 1 tablet, Oral, TID  [START ON 1/3/2021] cefTRIAXone, 1 g, Intravenous, Q24H  cholecalciferol, 2,000 Units, Oral, Daily  docusate sodium, 100 mg, Oral, " BID  insulin glargine, 18 Units, Subcutaneous, BID  insulin lispro, 0-9 Units, Subcutaneous, TID AC  metoprolol tartrate, 50 mg, Oral, BID  nystatin, , Topical, TID  pantoprazole, 40 mg, Intravenous, Q12H  sodium chloride, 10 mL, Intravenous, Q12H      Continuous Infusions:sodium chloride, 100 mL/hr, Last Rate: 100 mL/hr (01/02/21 0508)      PRN Meds:.•  acetaminophen  •  dextrose  •  dextrose  •  glucagon (human recombinant)  •  insulin lispro **AND** insulin lispro  •  sodium chloride    ALLERGIES:  Codeine, Latex, and Namenda [memantine]    ROS:  Dementia      Objective  Resting in hospital bed     Vital Signs:   Vitals:    01/02/21 0319 01/02/21 0419 01/02/21 0504 01/02/21 0604   BP: 126/44 148/93 141/69 108/67   BP Location:       Patient Position:       Pulse: 83 80 87 75   Resp: 17 18 18 18   Temp:       TempSrc:       SpO2: 97% 96% 99% 95%   Weight:       Height:           Physical Exam:   General Appearance:    Awake and alert to person , in no acute distress   Head:    Normocephalic, without obvious abnormality, atraumatic   Eyes:            Conjunctivae normal, anicteric sclera, pupils equal   Ears:    Ears appear intact with no abnormalities noted   Throat:   No oral lesions, no thrush, oral mucosa moist. NG tube in place with brown secretions noted in tubing. None in cannister.    Neck:   Supple, no JVD   Lungs:     Clear to auscultation bilaterally, respirations regular, even and unlabored    Heart:    Regular rhythm and normal rate, normal S1 and S2, no            Murmur appreciated   Chest Wall:    No abnormalities observed   Abdomen:     Normal bowel sounds, soft, non-tender, no rebound or guarding, non-distended, no hepatosplenomegaly   Rectal:     Deferred   Extremities:   Moves all extremities, 2+ edema, no cyanosis   Pulses:   Pulses palpable and equal bilaterally   Skin:   No rash, no jaundice, normal palpaion   Lymph nodes:   No cervical, supraclavicular or submandibular palpable adenopathy    Neurologic:    AOO x1      Results Review:   I reviewed the patient's labs and imaging.  Lab Results (last 24 hours)     Procedure Component Value Units Date/Time    Basic Metabolic Panel [[id number]]  (Abnormal) Collected: 01/02/21 0512    Specimen: Blood Updated: 01/02/21 0607     Glucose 187 mg/dL      BUN 20 mg/dL      Creatinine 1.44 mg/dL      Sodium 142 mmol/L      Potassium 4.0 mmol/L      Chloride 100 mmol/L      CO2 32.0 mmol/L      Calcium 9.8 mg/dL      eGFR Non African Amer 35 mL/min/1.73      BUN/Creatinine Ratio 13.9     Anion Gap 10.0 mmol/L     Narrative:      GFR Normal >60  Chronic Kidney Disease <60  Kidney Failure <15      Hemoglobin & Hematocrit, Blood [[id number]]  (Abnormal) Collected: 01/02/21 0512    Specimen: Blood Updated: 01/02/21 0524     Hemoglobin 11.4 g/dL      Hematocrit 34.8 %     Hemoglobin A1c [[id number]] Collected: 01/01/21 2342    Specimen: Blood from Arm, Right Updated: 01/02/21 0506    Comprehensive Metabolic Panel [[id number]]  (Abnormal) Collected: 01/01/21 2342    Specimen: Blood from Arm, Right Updated: 01/02/21 0017     Glucose 186 mg/dL      BUN 19 mg/dL      Creatinine 1.51 mg/dL      Sodium 139 mmol/L      Potassium 3.9 mmol/L      Chloride 95 mmol/L      CO2 34.0 mmol/L      Calcium 9.8 mg/dL      Total Protein 7.1 g/dL      Albumin 3.70 g/dL      ALT (SGPT) 9 U/L      AST (SGOT) 14 U/L      Alkaline Phosphatase 93 U/L      Total Bilirubin 0.3 mg/dL      eGFR Non African Amer 33 mL/min/1.73      Globulin 3.4 gm/dL      A/G Ratio 1.1 g/dL      BUN/Creatinine Ratio 12.6     Anion Gap 10.0 mmol/L     Narrative:      GFR Normal >60  Chronic Kidney Disease <60  Kidney Failure <15      Troponin [[id number]]  (Normal) Collected: 01/01/21 2342    Specimen: Blood from Arm, Right Updated: 01/02/21 0017     Troponin T <0.010 ng/mL     Narrative:      Troponin T Reference Range:  <= 0.03 ng/mL-   Negative for AMI  >0.03 ng/mL-     Abnormal for myocardial necrosis.  Clinicians  would have to utilize clinical acumen, EKG, Troponin and serial changes to determine if it is an Acute Myocardial Infarction or myocardial injury due to an underlying chronic condition.       Results may be falsely decreased if patient taking Biotin.      Amylase [[id number]]  (Normal) Collected: 01/01/21 2342    Specimen: Blood from Arm, Right Updated: 01/02/21 0017     Amylase 38 U/L     Lipase [[id number]]  (Normal) Collected: 01/01/21 2342    Specimen: Blood from Arm, Right Updated: 01/02/21 0017     Lipase 16 U/L     aPTT [[id number]]  (Normal) Collected: 01/01/21 2342    Specimen: Blood from Arm, Right Updated: 01/02/21 0010     PTT 30.8 seconds     Protime-INR [[id number]]  (Abnormal) Collected: 01/01/21 2342    Specimen: Blood from Arm, Right Updated: 01/02/21 0010     Protime 13.0 Seconds      INR 1.19    Urinalysis, Microscopic Only - Urine, Catheter [[id number]]  (Abnormal) Collected: 01/01/21 2338    Specimen: Urine, Catheter Updated: 01/02/21 0008     RBC, UA 0-2 /HPF      WBC, UA Too Numerous to Count /HPF      Bacteria, UA 4+ /HPF      Squamous Epithelial Cells, UA 3-6 /HPF      Hyaline Casts, UA None Seen /LPF      Methodology Manual Light Microscopy    Urine Culture - Urine, Urine, Catheter [[id number]] Collected: 01/01/21 2338    Specimen: Urine, Catheter Updated: 01/02/21 0008    COVID-19, ABBOTT IN-HOUSE,NASAL Swab (NO TRANSPORT MEDIA) 2 HR TAT - Swab, Nasopharynx [[id number]]  (Normal) Collected: 01/01/21 2342    Specimen: Swab from Nasopharynx Updated: 01/02/21 0007     COVID19 Presumptive Negative    Narrative:      Fact sheet for providers: https://www.fda.gov/media/463935/download     Fact sheet for patients: https://www.fda.gov/media/189002/download    Test performed by PCR.  If inconsistent with clinical signs and symptoms patient should be tested with different authorized molecular test.    Urinalysis With Culture If Indicated - Urine, Catheter [[id number]]  (Abnormal) Collected: 01/01/21 2338     Specimen: Urine, Catheter Updated: 01/01/21 2356     Color, UA Yellow     Appearance, UA Turbid     Comment: Result checked         pH, UA 6.5     Specific Gravity, UA 1.019     Glucose, UA Negative     Ketones, UA Trace     Bilirubin, UA Negative     Blood, UA Small (1+)     Protein, UA 30 mg/dL (1+)     Leuk Esterase, UA Large (3+)     Nitrite, UA Negative     Urobilinogen, UA 1.0 E.U./dL    CBC & Differential [[id number]]  (Abnormal) Collected: 01/01/21 2342    Specimen: Blood from Arm, Right Updated: 01/01/21 2354    Narrative:      The following orders were created for panel order CBC & Differential.  Procedure                               Abnormality         Status                     ---------                               -----------         ------                     CBC Auto Differential[[id number]]        Abnormal            Final result                 Please view results for these tests on the individual orders.    CBC Auto Differential [[id number]]  (Abnormal) Collected: 01/01/21 2342    Specimen: Blood from Arm, Right Updated: 01/01/21 2354     WBC 9.60 10*3/mm3      RBC 4.02 10*6/mm3      Hemoglobin 12.4 g/dL      Hematocrit 37.4 %      MCV 92.9 fL      MCH 30.9 pg      MCHC 33.3 g/dL      RDW 15.1 %      RDW-SD 49.4 fl      MPV 8.0 fL      Platelets 323 10*3/mm3      Neutrophil % 69.2 %      Lymphocyte % 18.7 %      Monocyte % 9.2 %      Eosinophil % 2.3 %      Basophil % 0.6 %      Neutrophils, Absolute 6.60 10*3/mm3      Lymphocytes, Absolute 1.80 10*3/mm3      Monocytes, Absolute 0.90 10*3/mm3      Eosinophils, Absolute 0.20 10*3/mm3      Basophils, Absolute 0.10 10*3/mm3      nRBC 0.1 /100 WBC           Imaging Results (Last 24 Hours)     Procedure Component Value Units Date/Time    CT Abdomen Pelvis Without Contrast [[id number]] Collected: 01/01/21 2314     Updated: 01/02/21 0121    Narrative:      EXAMINATION: CT ABDOMEN AND PELVIS WITHOUT IV CONTRAST       DATE OF EXAMINATION:  1/2/2021    INDICATION: Abdominal pain and vomiting    COMPARISON: None available.    PROCEDURE:   Axial CT of the abdomen and pelvis was performed with sagittal and coronal reformatted images without contrast enhancement.  The exam is limited because some types of pathology may not be adequately demonstrated due to lack of contrast   enhancement.  CT dose lowering techniques were used, to include: automated exposure control, adjustment for patient size, and or use of iterative reconstruction.    FINDINGS:    LOWER CHEST :  Large paraesophageal hiatal hernia.  Calcification mitral valve annulus..  Mild chronic lung disease.    ABDOMEN:    Liver and Biliary system:  Normal.    Gallbladder:  Normal.    Spleen:  Normal.    Pancreas:  Normal.    Adrenal glands:  Normal.    Kidneys and ureters:  Normal. No masses or inflammatory process. No urolithiasis.    Aorta/IVC:   Atherosclerotic aorta. No aortic aneurysm or dissection.  IVC normal.    Lymph nodes:  No significant lymphadenopathy.    Stomach: There is a large paraesophageal hiatal hernia.  The portion the stomach above the diaphragm is moderately dilated and fluid-filled.    Bowel: No obstruction, free air, or ascites.  No mucosal thickening.    Appendix: Normal.    Peritoneum/Mesentery:  Normal.    Abdominal wall:  Normal.    PELVIS:  Urinary bladder:  Normal.    Reproductive organs:  Hysterectomy.    Lymph Nodes:  Normal.    BONES:  Marked chronic T12 compression fracture.  L3 vertebral plasty..    ADDITIONAL  SIGNIFICANT FINDINGS:  None.        Impression:      1.  Large paraesophageal hiatal hernia.  The gastric body is distended and fluid-filled due to obstruction at the level of the antrum as it passes through the diaphragm.  2.  Mitral valve annular calcification.  Coronary artery atherosclerosis.  3.  Sigmoid diverticulosis.  4.  Hysterectomy.  5.  Chronic T12 compression fracture.  L3 vertebral plasty.    Electronically signed by:  Emile Coelho M.D.     1/1/2021 11:20 PM             ASSESSMENT AND PLAN:  Coffee ground emesis consider upper GI bleed from gastritis, esophagitis, Paco lesions or ulcer   Iron deficiency anemia  Dementia  CVA on aspirin and Plavix  Diabetes  GERD  UTI    PLAN:  Patient is sent in by extended care facility for coffee-ground emesis.  Patient is on Plavix and aspirin.  We will plan EGD around noon today.  Continue PPI for possible upper GI bleed.  N.p.o. until after scope.  Hold ASA & Plavix   COVID negative    I discussed the patients findings and my recommendations with the patient.  Alix Jimenez, APRN  01/02/21  07:42 EST    Time:       Electronically signed by Yoan Mendoza MD at 01/02/21 1227       Physical Therapy Notes (last 48 hours) (Notes from 01/01/21 1018 through 01/03/21 1018)    No notes exist for this encounter.       Occupational Therapy Notes (last 48 hours) (Notes from 01/01/21 1018 through 01/03/21 1018)    No notes exist for this encounter.

## 2021-01-04 LAB — BACTERIA SPEC AEROBE CULT: ABNORMAL

## 2021-01-04 NOTE — PROGRESS NOTES
Case Management Discharge Note      Final Note: [facility]         Selected Continued Care - Discharged on 1/3/2021 Admission date: 1/1/2021 - Discharge disposition: Skilled Nursing Facility (DC - External)    Destination Coordination complete    Service Provider Selected Services Address Phone Fax Patient Preferred    [facility]  Skilled Nursing [address] [place] [address] [phone] --                    Final Discharge Disposition Code: 03 - skilled nursing facility (SNF)

## 2021-12-14 ENCOUNTER — HOSPITAL ENCOUNTER (INPATIENT)
Facility: HOSPITAL | Age: 81
LOS: 3 days | Discharge: NURSING, REHAB OR LONG-TERM CARE | End: 2021-12-17
Attending: EMERGENCY MEDICINE | Admitting: INTERNAL MEDICINE

## 2021-12-14 ENCOUNTER — APPOINTMENT (OUTPATIENT)
Dept: GENERAL RADIOLOGY | Facility: HOSPITAL | Age: 81
End: 2021-12-14

## 2021-12-14 DIAGNOSIS — I50.9 CONGESTIVE HEART FAILURE, UNSPECIFIED HF CHRONICITY, UNSPECIFIED HEART FAILURE TYPE (HCC): ICD-10-CM

## 2021-12-14 DIAGNOSIS — R06.00 DYSPNEA, UNSPECIFIED TYPE: Primary | ICD-10-CM

## 2021-12-14 DIAGNOSIS — J44.1 COPD EXACERBATION (HCC): ICD-10-CM

## 2021-12-14 DIAGNOSIS — R77.8 ELEVATED TROPONIN: ICD-10-CM

## 2021-12-14 PROBLEM — J96.11 CHRONIC RESPIRATORY FAILURE WITH HYPOXIA (HCC): Status: ACTIVE | Noted: 2021-12-14

## 2021-12-14 PROBLEM — E66.9 OBESITY (BMI 30-39.9): Status: ACTIVE | Noted: 2021-12-14

## 2021-12-14 LAB
ALBUMIN SERPL-MCNC: 3.5 G/DL (ref 3.5–5.2)
ALBUMIN/GLOB SERPL: 1 G/DL
ALP SERPL-CCNC: 101 U/L (ref 39–117)
ALT SERPL W P-5'-P-CCNC: 14 U/L (ref 1–33)
ANION GAP SERPL CALCULATED.3IONS-SCNC: 12 MMOL/L (ref 5–15)
ARTERIAL PATENCY WRIST A: POSITIVE
AST SERPL-CCNC: 29 U/L (ref 1–32)
ATMOSPHERIC PRESS: ABNORMAL MM[HG]
B PARAPERT DNA SPEC QL NAA+PROBE: NOT DETECTED
B PERT DNA SPEC QL NAA+PROBE: NOT DETECTED
BASE EXCESS BLDA CALC-SCNC: 1.8 MMOL/L (ref 0–3)
BASOPHILS # BLD AUTO: 0.1 10*3/MM3 (ref 0–0.2)
BASOPHILS NFR BLD AUTO: 0.5 % (ref 0–1.5)
BDY SITE: ABNORMAL
BILIRUB SERPL-MCNC: 0.6 MG/DL (ref 0–1.2)
BUN SERPL-MCNC: 22 MG/DL (ref 8–23)
BUN/CREAT SERPL: 13 (ref 7–25)
C PNEUM DNA NPH QL NAA+NON-PROBE: NOT DETECTED
CALCIUM SPEC-SCNC: 8.9 MG/DL (ref 8.6–10.5)
CHLORIDE SERPL-SCNC: 99 MMOL/L (ref 98–107)
CO2 BLDA-SCNC: 26.7 MMOL/L (ref 22–29)
CO2 SERPL-SCNC: 27 MMOL/L (ref 22–29)
CREAT SERPL-MCNC: 1.69 MG/DL (ref 0.57–1)
CRP SERPL-MCNC: 16.47 MG/DL (ref 0–0.5)
D DIMER PPP FEU-MCNC: 1.09 MG/L (FEU) (ref 0–0.59)
DEPRECATED RDW RBC AUTO: 49.9 FL (ref 37–54)
EOSINOPHIL # BLD AUTO: 0 10*3/MM3 (ref 0–0.4)
EOSINOPHIL NFR BLD AUTO: 0.1 % (ref 0.3–6.2)
ERYTHROCYTE [DISTWIDTH] IN BLOOD BY AUTOMATED COUNT: 15.2 % (ref 12.3–15.4)
FLUAV SUBTYP SPEC NAA+PROBE: NOT DETECTED
FLUBV RNA ISLT QL NAA+PROBE: NOT DETECTED
GFR SERPL CREATININE-BSD FRML MDRD: 29 ML/MIN/1.73
GLOBULIN UR ELPH-MCNC: 3.5 GM/DL
GLUCOSE SERPL-MCNC: 173 MG/DL (ref 65–99)
HADV DNA SPEC NAA+PROBE: NOT DETECTED
HCO3 BLDA-SCNC: 25.6 MMOL/L (ref 21–28)
HCOV 229E RNA SPEC QL NAA+PROBE: NOT DETECTED
HCOV HKU1 RNA SPEC QL NAA+PROBE: NOT DETECTED
HCOV NL63 RNA SPEC QL NAA+PROBE: NOT DETECTED
HCOV OC43 RNA SPEC QL NAA+PROBE: NOT DETECTED
HCT VFR BLD AUTO: 34.5 % (ref 34–46.6)
HEMODILUTION: NO
HGB BLD-MCNC: 11.9 G/DL (ref 12–15.9)
HMPV RNA NPH QL NAA+NON-PROBE: NOT DETECTED
HPIV1 RNA ISLT QL NAA+PROBE: NOT DETECTED
HPIV2 RNA SPEC QL NAA+PROBE: NOT DETECTED
HPIV3 RNA NPH QL NAA+PROBE: NOT DETECTED
HPIV4 P GENE NPH QL NAA+PROBE: NOT DETECTED
INHALED O2 CONCENTRATION: 28 %
LYMPHOCYTES # BLD AUTO: 0.8 10*3/MM3 (ref 0.7–3.1)
LYMPHOCYTES NFR BLD AUTO: 7.2 % (ref 19.6–45.3)
M PNEUMO IGG SER IA-ACNC: NOT DETECTED
MAGNESIUM SERPL-MCNC: 1.7 MG/DL (ref 1.6–2.4)
MCH RBC QN AUTO: 32.4 PG (ref 26.6–33)
MCHC RBC AUTO-ENTMCNC: 34.3 G/DL (ref 31.5–35.7)
MCV RBC AUTO: 94.3 FL (ref 79–97)
MODALITY: ABNORMAL
MONOCYTES # BLD AUTO: 1.3 10*3/MM3 (ref 0.1–0.9)
MONOCYTES NFR BLD AUTO: 11.2 % (ref 5–12)
NEUTROPHILS NFR BLD AUTO: 81 % (ref 42.7–76)
NEUTROPHILS NFR BLD AUTO: 9.4 10*3/MM3 (ref 1.7–7)
NRBC BLD AUTO-RTO: 0 /100 WBC (ref 0–0.2)
NT-PROBNP SERPL-MCNC: 7496 PG/ML (ref 0–1800)
PCO2 BLDA: 36.3 MM HG (ref 35–48)
PH BLDA: 7.46 PH UNITS (ref 7.35–7.45)
PLATELET # BLD AUTO: 233 10*3/MM3 (ref 140–450)
PMV BLD AUTO: 7.9 FL (ref 6–12)
PO2 BLDA: 97.9 MM HG (ref 83–108)
POTASSIUM SERPL-SCNC: 3.5 MMOL/L (ref 3.5–5.2)
PROCALCITONIN SERPL-MCNC: 2.21 NG/ML (ref 0–0.25)
PROT SERPL-MCNC: 7 G/DL (ref 6–8.5)
RBC # BLD AUTO: 3.66 10*6/MM3 (ref 3.77–5.28)
RHINOVIRUS RNA SPEC NAA+PROBE: NOT DETECTED
RSV RNA NPH QL NAA+NON-PROBE: NOT DETECTED
SAO2 % BLDCOA: 98 % (ref 94–98)
SARS-COV-2 RNA NPH QL NAA+NON-PROBE: NOT DETECTED
SODIUM SERPL-SCNC: 138 MMOL/L (ref 136–145)
TROPONIN T SERPL-MCNC: 0.07 NG/ML (ref 0–0.03)
TROPONIN T SERPL-MCNC: 0.1 NG/ML (ref 0–0.03)
TSH SERPL DL<=0.05 MIU/L-ACNC: 2.38 UIU/ML (ref 0.27–4.2)
WBC NRBC COR # BLD: 11.6 10*3/MM3 (ref 3.4–10.8)

## 2021-12-14 PROCEDURE — 25010000002 HEPARIN (PORCINE) PER 1000 UNITS: Performed by: NURSE PRACTITIONER

## 2021-12-14 PROCEDURE — 86140 C-REACTIVE PROTEIN: CPT | Performed by: NURSE PRACTITIONER

## 2021-12-14 PROCEDURE — 82803 BLOOD GASES ANY COMBINATION: CPT

## 2021-12-14 PROCEDURE — 84484 ASSAY OF TROPONIN QUANT: CPT | Performed by: NURSE PRACTITIONER

## 2021-12-14 PROCEDURE — 36415 COLL VENOUS BLD VENIPUNCTURE: CPT | Performed by: NURSE PRACTITIONER

## 2021-12-14 PROCEDURE — 99222 1ST HOSP IP/OBS MODERATE 55: CPT | Performed by: NURSE PRACTITIONER

## 2021-12-14 PROCEDURE — 84484 ASSAY OF TROPONIN QUANT: CPT | Performed by: EMERGENCY MEDICINE

## 2021-12-14 PROCEDURE — 94640 AIRWAY INHALATION TREATMENT: CPT

## 2021-12-14 PROCEDURE — 71045 X-RAY EXAM CHEST 1 VIEW: CPT

## 2021-12-14 PROCEDURE — 83880 ASSAY OF NATRIURETIC PEPTIDE: CPT | Performed by: EMERGENCY MEDICINE

## 2021-12-14 PROCEDURE — 94799 UNLISTED PULMONARY SVC/PX: CPT

## 2021-12-14 PROCEDURE — 83036 HEMOGLOBIN GLYCOSYLATED A1C: CPT | Performed by: NURSE PRACTITIONER

## 2021-12-14 PROCEDURE — 80053 COMPREHEN METABOLIC PANEL: CPT | Performed by: EMERGENCY MEDICINE

## 2021-12-14 PROCEDURE — 25010000002 FUROSEMIDE PER 20 MG: Performed by: EMERGENCY MEDICINE

## 2021-12-14 PROCEDURE — 84443 ASSAY THYROID STIM HORMONE: CPT | Performed by: NURSE PRACTITIONER

## 2021-12-14 PROCEDURE — 25010000002 METHYLPREDNISOLONE PER 40 MG: Performed by: NURSE PRACTITIONER

## 2021-12-14 PROCEDURE — 85379 FIBRIN DEGRADATION QUANT: CPT | Performed by: NURSE PRACTITIONER

## 2021-12-14 PROCEDURE — 84145 PROCALCITONIN (PCT): CPT | Performed by: NURSE PRACTITIONER

## 2021-12-14 PROCEDURE — 93005 ELECTROCARDIOGRAM TRACING: CPT

## 2021-12-14 PROCEDURE — 85025 COMPLETE CBC W/AUTO DIFF WBC: CPT | Performed by: EMERGENCY MEDICINE

## 2021-12-14 PROCEDURE — 83735 ASSAY OF MAGNESIUM: CPT | Performed by: NURSE PRACTITIONER

## 2021-12-14 PROCEDURE — 0202U NFCT DS 22 TRGT SARS-COV-2: CPT | Performed by: EMERGENCY MEDICINE

## 2021-12-14 PROCEDURE — 36600 WITHDRAWAL OF ARTERIAL BLOOD: CPT

## 2021-12-14 PROCEDURE — 99285 EMERGENCY DEPT VISIT HI MDM: CPT

## 2021-12-14 PROCEDURE — 93005 ELECTROCARDIOGRAM TRACING: CPT | Performed by: EMERGENCY MEDICINE

## 2021-12-14 PROCEDURE — 25010000002 METHYLPREDNISOLONE PER 125 MG: Performed by: EMERGENCY MEDICINE

## 2021-12-14 RX ORDER — FUROSEMIDE 20 MG/1
20 TABLET ORAL 2 TIMES DAILY
Status: ON HOLD | COMMUNITY
End: 2021-12-17 | Stop reason: SDUPTHER

## 2021-12-14 RX ORDER — CHLORAL HYDRATE 500 MG
1000 CAPSULE ORAL
COMMUNITY

## 2021-12-14 RX ORDER — IPRATROPIUM BROMIDE AND ALBUTEROL SULFATE 2.5; .5 MG/3ML; MG/3ML
3 SOLUTION RESPIRATORY (INHALATION)
Status: DISCONTINUED | OUTPATIENT
Start: 2021-12-14 | End: 2021-12-17 | Stop reason: HOSPADM

## 2021-12-14 RX ORDER — ASPIRIN 325 MG
325 TABLET ORAL ONCE
Status: COMPLETED | OUTPATIENT
Start: 2021-12-14 | End: 2021-12-14

## 2021-12-14 RX ORDER — ONDANSETRON 4 MG/1
4 TABLET, FILM COATED ORAL EVERY 6 HOURS PRN
Status: DISCONTINUED | OUTPATIENT
Start: 2021-12-14 | End: 2021-12-17 | Stop reason: HOSPADM

## 2021-12-14 RX ORDER — FUROSEMIDE 10 MG/ML
40 INJECTION INTRAMUSCULAR; INTRAVENOUS EVERY 12 HOURS
Status: DISCONTINUED | OUTPATIENT
Start: 2021-12-15 | End: 2021-12-16

## 2021-12-14 RX ORDER — ALBUTEROL SULFATE 2.5 MG/3ML
2.5 SOLUTION RESPIRATORY (INHALATION) ONCE
Status: DISCONTINUED | OUTPATIENT
Start: 2021-12-14 | End: 2021-12-17 | Stop reason: HOSPADM

## 2021-12-14 RX ORDER — ACETAMINOPHEN 325 MG/1
650 TABLET ORAL EVERY 4 HOURS PRN
Status: DISCONTINUED | OUTPATIENT
Start: 2021-12-14 | End: 2021-12-17 | Stop reason: HOSPADM

## 2021-12-14 RX ORDER — MAGNESIUM SULFATE HEPTAHYDRATE 40 MG/ML
2 INJECTION, SOLUTION INTRAVENOUS AS NEEDED
Status: DISCONTINUED | OUTPATIENT
Start: 2021-12-14 | End: 2021-12-17 | Stop reason: HOSPADM

## 2021-12-14 RX ORDER — POTASSIUM CHLORIDE 20 MEQ/1
40 TABLET, EXTENDED RELEASE ORAL AS NEEDED
Status: DISCONTINUED | OUTPATIENT
Start: 2021-12-14 | End: 2021-12-17 | Stop reason: HOSPADM

## 2021-12-14 RX ORDER — HEPARIN SODIUM 5000 [USP'U]/ML
5000 INJECTION, SOLUTION INTRAVENOUS; SUBCUTANEOUS EVERY 12 HOURS SCHEDULED
Status: DISCONTINUED | OUTPATIENT
Start: 2021-12-14 | End: 2021-12-17 | Stop reason: HOSPADM

## 2021-12-14 RX ORDER — INSULIN LISPRO 100 [IU]/ML
0-14 INJECTION, SOLUTION INTRAVENOUS; SUBCUTANEOUS
Status: DISCONTINUED | OUTPATIENT
Start: 2021-12-15 | End: 2021-12-16

## 2021-12-14 RX ORDER — DEXTROSE MONOHYDRATE 25 G/50ML
25 INJECTION, SOLUTION INTRAVENOUS
Status: DISCONTINUED | OUTPATIENT
Start: 2021-12-14 | End: 2021-12-17 | Stop reason: HOSPADM

## 2021-12-14 RX ORDER — NITROGLYCERIN 0.4 MG/1
0.4 TABLET SUBLINGUAL
Status: DISCONTINUED | OUTPATIENT
Start: 2021-12-14 | End: 2021-12-17 | Stop reason: HOSPADM

## 2021-12-14 RX ORDER — FAMOTIDINE 20 MG/1
20 TABLET, FILM COATED ORAL NIGHTLY
COMMUNITY
End: 2021-12-17 | Stop reason: HOSPADM

## 2021-12-14 RX ORDER — SODIUM CHLORIDE 0.9 % (FLUSH) 0.9 %
10 SYRINGE (ML) INJECTION AS NEEDED
Status: DISCONTINUED | OUTPATIENT
Start: 2021-12-14 | End: 2021-12-17 | Stop reason: HOSPADM

## 2021-12-14 RX ORDER — METHYLPREDNISOLONE SODIUM SUCCINATE 125 MG/2ML
125 INJECTION, POWDER, LYOPHILIZED, FOR SOLUTION INTRAMUSCULAR; INTRAVENOUS ONCE
Status: COMPLETED | OUTPATIENT
Start: 2021-12-14 | End: 2021-12-14

## 2021-12-14 RX ORDER — ONDANSETRON 2 MG/ML
4 INJECTION INTRAMUSCULAR; INTRAVENOUS EVERY 6 HOURS PRN
Status: DISCONTINUED | OUTPATIENT
Start: 2021-12-14 | End: 2021-12-17 | Stop reason: HOSPADM

## 2021-12-14 RX ORDER — ALBUTEROL SULFATE 90 UG/1
2 AEROSOL, METERED RESPIRATORY (INHALATION) ONCE
Status: COMPLETED | OUTPATIENT
Start: 2021-12-14 | End: 2021-12-14

## 2021-12-14 RX ORDER — POTASSIUM CHLORIDE 1.5 G/1.77G
40 POWDER, FOR SOLUTION ORAL AS NEEDED
Status: DISCONTINUED | OUTPATIENT
Start: 2021-12-14 | End: 2021-12-17 | Stop reason: HOSPADM

## 2021-12-14 RX ORDER — METHYLPREDNISOLONE SODIUM SUCCINATE 40 MG/ML
40 INJECTION, POWDER, LYOPHILIZED, FOR SOLUTION INTRAMUSCULAR; INTRAVENOUS EVERY 8 HOURS
Status: DISCONTINUED | OUTPATIENT
Start: 2021-12-14 | End: 2021-12-17

## 2021-12-14 RX ORDER — MAGNESIUM SULFATE 1 G/100ML
1 INJECTION INTRAVENOUS AS NEEDED
Status: DISCONTINUED | OUTPATIENT
Start: 2021-12-14 | End: 2021-12-17 | Stop reason: HOSPADM

## 2021-12-14 RX ORDER — OLANZAPINE 10 MG/2ML
1 INJECTION, POWDER, LYOPHILIZED, FOR SOLUTION INTRAMUSCULAR AS NEEDED
Status: DISCONTINUED | OUTPATIENT
Start: 2021-12-14 | End: 2021-12-17 | Stop reason: HOSPADM

## 2021-12-14 RX ORDER — IPRATROPIUM BROMIDE AND ALBUTEROL SULFATE 2.5; .5 MG/3ML; MG/3ML
3 SOLUTION RESPIRATORY (INHALATION) EVERY 4 HOURS PRN
Status: DISCONTINUED | OUTPATIENT
Start: 2021-12-14 | End: 2021-12-17 | Stop reason: HOSPADM

## 2021-12-14 RX ORDER — FUROSEMIDE 10 MG/ML
40 INJECTION INTRAMUSCULAR; INTRAVENOUS ONCE
Status: COMPLETED | OUTPATIENT
Start: 2021-12-14 | End: 2021-12-14

## 2021-12-14 RX ORDER — ACETAMINOPHEN 650 MG/1
650 SUPPOSITORY RECTAL EVERY 4 HOURS PRN
Status: DISCONTINUED | OUTPATIENT
Start: 2021-12-14 | End: 2021-12-17 | Stop reason: HOSPADM

## 2021-12-14 RX ORDER — NICOTINE POLACRILEX 4 MG
15 LOZENGE BUCCAL
Status: DISCONTINUED | OUTPATIENT
Start: 2021-12-14 | End: 2021-12-17 | Stop reason: HOSPADM

## 2021-12-14 RX ORDER — SODIUM CHLORIDE 0.9 % (FLUSH) 0.9 %
10 SYRINGE (ML) INJECTION EVERY 12 HOURS SCHEDULED
Status: DISCONTINUED | OUTPATIENT
Start: 2021-12-14 | End: 2021-12-17 | Stop reason: HOSPADM

## 2021-12-14 RX ORDER — ACETAMINOPHEN 160 MG/5ML
650 SOLUTION ORAL EVERY 4 HOURS PRN
Status: DISCONTINUED | OUTPATIENT
Start: 2021-12-14 | End: 2021-12-17 | Stop reason: HOSPADM

## 2021-12-14 RX ORDER — INSULIN LISPRO 100 [IU]/ML
0-14 INJECTION, SOLUTION INTRAVENOUS; SUBCUTANEOUS AS NEEDED
Status: DISCONTINUED | OUTPATIENT
Start: 2021-12-14 | End: 2021-12-16

## 2021-12-14 RX ADMIN — SODIUM CHLORIDE, PRESERVATIVE FREE 10 ML: 5 INJECTION INTRAVENOUS at 23:07

## 2021-12-14 RX ADMIN — Medication 10 ML: at 23:07

## 2021-12-14 RX ADMIN — METHYLPREDNISOLONE SODIUM SUCCINATE 125 MG: 125 INJECTION, POWDER, FOR SOLUTION INTRAMUSCULAR; INTRAVENOUS at 18:11

## 2021-12-14 RX ADMIN — ALBUTEROL SULFATE 2 PUFF: 90 AEROSOL, METERED RESPIRATORY (INHALATION) at 18:52

## 2021-12-14 RX ADMIN — IPRATROPIUM BROMIDE AND ALBUTEROL SULFATE 3 ML: 2.5; .5 SOLUTION RESPIRATORY (INHALATION) at 21:53

## 2021-12-14 RX ADMIN — FUROSEMIDE 40 MG: 10 INJECTION, SOLUTION INTRAMUSCULAR; INTRAVENOUS at 19:32

## 2021-12-14 RX ADMIN — HEPARIN SODIUM 5000 UNITS: 5000 INJECTION INTRAVENOUS; SUBCUTANEOUS at 23:07

## 2021-12-14 RX ADMIN — METHYLPREDNISOLONE SODIUM SUCCINATE 40 MG: 40 INJECTION, POWDER, FOR SOLUTION INTRAMUSCULAR; INTRAVENOUS at 20:53

## 2021-12-14 RX ADMIN — ASPIRIN 325 MG ORAL TABLET 325 MG: 325 PILL ORAL at 19:32

## 2021-12-14 NOTE — ED PROVIDER NOTES
Subjective   History of Present Illness  Shortness of breath  81-year-old female presents from the extended care facility with reports of shortness of breath this afternoon.  There is no reported cough or fever.  Poorly there has been some Covid at the facility.  She reportedly had a rapid screen this morning it was negative.  She denies chest pain.  EMS reports she was wheezing when they arrived and they did give her a DuoNeb treatment in route and she improved.  She was also requiring oxygen in route.  Review of Systems   Unable to perform ROS: Dementia       Past Medical History:   Diagnosis Date   • Anemia    • Atherosclerotic heart disease of native coronary artery without angina pectoris    • COPD (chronic obstructive pulmonary disease) (CMS/[facility])    • COVID-19 03/2020   • Dementia (CMS/[facility])    • Diabetes mellitus (CMS/[facility])    • GERD (gastroesophageal reflux disease)    • Gout    • Hyperlipidemia    • Hypertension    • Localized edema    • Parkinson's disease (CMS/[facility])    • Vitamin D deficiency        Allergies   Allergen Reactions   • Codeine Anaphylaxis   • Latex Anaphylaxis   • Namenda [Memantine] Anaphylaxis       Past Surgical History:   Procedure Laterality Date   • ENDOSCOPY N/A 1/2/2021    Procedure: ESOPHAGOGASTRODUODENOSCOPY;  Surgeon: Yoan Mendoza MD;  Location: [facility] ENDOSCOPY;  Service: Gastroenterology;  Laterality: N/A;  post: esophageal trauma from nasogastric tube, large hiatal hernia       Family History   Family history unknown: Yes       Social History     Socioeconomic History   • Marital status:    Tobacco Use   • Smoking status: Never Smoker   • Smokeless tobacco: Never Used   Substance and Sexual Activity   • Alcohol use: Never   • Drug use: Never   • Sexual activity: Defer     Prior to Admission medications    Medication Sig Start Date End Date Taking? Authorizing Provider   acetaminophen (TYLENOL) 325 MG tablet Take 650 mg by mouth Every 6 (Six) Hours As Needed for Mild  "Pain  or Fever.    Lesa Silverman MD   allopurinol (ZYLOPRIM) 100 MG tablet Take 200 mg by mouth Daily.    Lesa Silverman MD   aspirin 81 MG EC tablet Take 81 mg by mouth Daily.    Lesa Silverman MD   carbidopa-levodopa (SINEMET)  MG per tablet Take 1 tablet by mouth 3 (Three) Times a Day.    Lesa Silverman MD   cholecalciferol (VITAMIN D3) 25 MCG (1000 UT) tablet Take 2,000 Units by mouth Daily.    Lesa Silverman MD   clopidogrel (PLAVIX) 75 MG tablet Take 75 mg by mouth Daily.    Lesa Silverman MD   docusate sodium (COLACE) 100 MG capsule Take 100 mg by mouth 2 (Two) Times a Day.    Lesa Silverman MD   Glucagon, rDNA, (Glucagon Emergency) 1 MG kit Inject  as directed As Needed.    Lesa Silverman MD   insulin glargine (LANTUS, SEMGLEE) 100 UNIT/ML injection Inject 18 Units under the skin into the appropriate area as directed 2 (Two) Times a Day.    Lesa Silverman MD   magnesium hydroxide (MILK OF MAGNESIA) 400 MG/5ML suspension Take 30 mL by mouth Daily As Needed for Constipation.    Lesa Silverman MD   metoprolol tartrate (LOPRESSOR) 50 MG tablet Take 50 mg by mouth 2 (Two) Times a Day.    Lesa Silverman MD   nystatin (MYCOSTATIN) 729757 UNIT/GM powder Apply  topically to the appropriate area as directed 3 (Three) Times a Day. AA    Lesa Silverman MD   pantoprazole (PROTONIX) 40 MG EC tablet Take 1 tablet by mouth Daily. 1/3/21   Napoleon Hicks MD     /75   Pulse 70   Temp 97.6 °F (36.4 °C)   Resp 20   Ht 165.1 cm (65\")   Wt 107 kg (235 lb)   SpO2 100%   Breastfeeding No   BMI 39.11 kg/m²   I examined the patient using the appropriate personal protective equipment.          Objective   Physical Exam  General: Obese female, no acute distress, alert and appropriate  Eyes:  sclera nonicteric  HEENT: Mucous membranes moist, no mucosal swelling  Neck: Supple, no nuchal rigidity, no soft tissue swelling  Respirations: " Some coarse breath sounds bilaterally, respirations mildly tachypneic  Heart regular rate and rhythm, no murmurs rubs or gallops,   Abdomen soft nontender nondistended, no hepatosplenomegaly, no hernia, no mass, normal bowel sounds, no CVA tenderness  Extremities no clubbing cyanosis or edema, calves are symmetric and nontender  Neuro cranial nerves grossly intact, no focal limb deficits, generally deconditioned  Psych oriented to person, alert and cooperative, pleasantly confused  Skin no rash, brisk cap refill  Procedures           ED Course      Results for orders placed or performed during the hospital encounter of 12/14/21   Respiratory Panel PCR w/COVID-19(SARS-CoV-2) [facility]GETTE/[facility]/AGU[facility]A/[facility]/GONZALO In-House, NP Swab in UTM/VTM, 3-4 HR TAT - Swab, Nasopharynx    Specimen: Nasopharynx; Swab   Result Value Ref Range    ADENOVIRUS, PCR Not Detected Not Detected    Coronavirus 229E Not Detected Not Detected    Coronavirus HKU1 Not Detected Not Detected    Coronavirus NL63 Not Detected Not Detected    Coronavirus OC43 Not Detected Not Detected    COVID19 Not Detected Not Detected - Ref. Range    Human Metapneumovirus Not Detected Not Detected    Human Rhinovirus/Enterovirus Not Detected Not Detected    Influenza A PCR Not Detected Not Detected    Influenza B PCR Not Detected Not Detected    Parainfluenza Virus 1 Not Detected Not Detected    Parainfluenza Virus 2 Not Detected Not Detected    Parainfluenza Virus 3 Not Detected Not Detected    Parainfluenza Virus 4 Not Detected Not Detected    RSV, PCR Not Detected Not Detected    Bordetella pertussis pcr Not Detected Not Detected    Bordetella parapertussis PCR Not Detected Not Detected    Chlamydophila pneumoniae PCR Not Detected Not Detected    Mycoplasma pneumo by PCR Not Detected Not Detected   Comprehensive Metabolic Panel    Specimen: Blood   Result Value Ref Range    Glucose 173 (H) 65 - 99 mg/dL    BUN 22 8 - 23 mg/dL    Creatinine 1.69 (H) 0.57 - 1.00 mg/dL     Sodium 138 136 - 145 mmol/L    Potassium 3.5 3.5 - 5.2 mmol/L    Chloride 99 98 - 107 mmol/L    CO2 27.0 22.0 - 29.0 mmol/L    Calcium 8.9 8.6 - 10.5 mg/dL    Total Protein 7.0 6.0 - 8.5 g/dL    Albumin 3.50 3.50 - 5.20 g/dL    ALT (SGPT) 14 1 - 33 U/L    AST (SGOT) 29 1 - 32 U/L    Alkaline Phosphatase 101 39 - 117 U/L    Total Bilirubin 0.6 0.0 - 1.2 mg/dL    eGFR Non African Amer 29 (L) >60 mL/min/1.73    Globulin 3.5 gm/dL    A/G Ratio 1.0 g/dL    BUN/Creatinine Ratio 13.0 7.0 - 25.0    Anion Gap 12.0 5.0 - 15.0 mmol/L   Troponin    Specimen: Blood   Result Value Ref Range    Troponin T 0.097 (C) 0.000 - 0.030 ng/mL   BNP    Specimen: Blood   Result Value Ref Range    proBNP 7,496.0 (H) 0.0-1,800.0 pg/mL   CBC Auto Differential    Specimen: Blood   Result Value Ref Range    WBC 11.60 (H) 3.40 - 10.80 10*3/mm3    RBC 3.66 (L) 3.77 - 5.28 10*6/mm3    Hemoglobin 11.9 (L) 12.0 - 15.9 g/dL    Hematocrit 34.5 34.0 - 46.6 %    MCV 94.3 79.0 - 97.0 fL    MCH 32.4 26.6 - 33.0 pg    MCHC 34.3 31.5 - 35.7 g/dL    RDW 15.2 12.3 - 15.4 %    RDW-SD 49.9 37.0 - 54.0 fl    MPV 7.9 6.0 - 12.0 fL    Platelets 233 140 - 450 10*3/mm3    Neutrophil % 81.0 (H) 42.7 - 76.0 %    Lymphocyte % 7.2 (L) 19.6 - 45.3 %    Monocyte % 11.2 5.0 - 12.0 %    Eosinophil % 0.1 (L) 0.3 - 6.2 %    Basophil % 0.5 0.0 - 1.5 %    Neutrophils, Absolute 9.40 (H) 1.70 - 7.00 10*3/mm3    Lymphocytes, Absolute 0.80 0.70 - 3.10 10*3/mm3    Monocytes, Absolute 1.30 (H) 0.10 - 0.90 10*3/mm3    Eosinophils, Absolute 0.00 0.00 - 0.40 10*3/mm3    Basophils, Absolute 0.10 0.00 - 0.20 10*3/mm3    nRBC 0.0 0.0 - 0.2 /100 WBC   ECG 12 Lead   Result Value Ref Range    QT Interval 396 ms     XR Chest 1 View    Result Date: 12/14/2021  Cardiomegaly with small pleural effusions and ill-defined pulmonary vessels, question pulmonary edema. Bibasilar airspace opacity right worse than left question subsegmental atelectasis. Pneumonia less likely. Large hiatal hernia.   Electronically Signed By-Mini Bower MD On:12/14/2021 5:52 PM This report was finalized on [id number] by  Mini Bower MD.         My EKG interpretation sinus rhythm rate of 70, first-degree AV block                                      MDM  Patient presents with some shortness of breath from extended-care facility.  Her respiratory panel was negative including negative for Covid.  Chest x-ray does shows pulmonary edema and the BNP is elevated along with some marginal elevation of troponin.  Notably she is not describing chest pain currently.  EKG shows no acute ischemic change.  She was ordered some Solu-Medrol and bronchodilator.  She is resting comfortably reexamination, she was also ordered Lasix for the pulmonary edema CHF.  Hospitalist service was paged for admission.  Final diagnoses:   Dyspnea, unspecified type   COPD exacerbation (HCC)   Congestive heart failure, unspecified HF chronicity, unspecified heart failure type (HCC)   Elevated troponin       ED Disposition  ED Disposition     ED Disposition Condition Comment    Decision to Admit            No follow-up provider specified.       Medication List      No changes were made to your prescriptions during this visit.          Dipesh Vizcarra MD  12/14/21 1911

## 2021-12-14 NOTE — ED NOTES
Daughter Evelyn called stating that pt has dementia and needs to be called if any issues arise, her phone number is in the demographic page        Madelin Montoya  12/14/21 [id number]

## 2021-12-15 ENCOUNTER — APPOINTMENT (OUTPATIENT)
Dept: CARDIOLOGY | Facility: HOSPITAL | Age: 81
End: 2021-12-15

## 2021-12-15 LAB
ANION GAP SERPL CALCULATED.3IONS-SCNC: 15 MMOL/L (ref 5–15)
BASOPHILS # BLD AUTO: 0 10*3/MM3 (ref 0–0.2)
BASOPHILS NFR BLD AUTO: 0.2 % (ref 0–1.5)
BH CV ECHO MEAS - ACS: 2.1 CM
BH CV ECHO MEAS - AO MAX PG (FULL): 0.09 MMHG
BH CV ECHO MEAS - AO MAX PG: 3.5 MMHG
BH CV ECHO MEAS - AO MEAN PG (FULL): 0.24 MMHG
BH CV ECHO MEAS - AO MEAN PG: 1.9 MMHG
BH CV ECHO MEAS - AO ROOT AREA (BSA CORRECTED): 1.7
BH CV ECHO MEAS - AO ROOT AREA: 10.2 CM^2
BH CV ECHO MEAS - AO ROOT DIAM: 3.6 CM
BH CV ECHO MEAS - AO V2 MAX: 93 CM/SEC
BH CV ECHO MEAS - AO V2 MEAN: 65 CM/SEC
BH CV ECHO MEAS - AO V2 VTI: 23.5 CM
BH CV ECHO MEAS - AORTIC HR: 57.7 BPM
BH CV ECHO MEAS - AORTIC R-R: 1 SEC
BH CV ECHO MEAS - ASC AORTA: 2.9 CM
BH CV ECHO MEAS - AVA(I,A): 2.8 CM^2
BH CV ECHO MEAS - AVA(I,D): 2.8 CM^2
BH CV ECHO MEAS - AVA(V,A): 3.1 CM^2
BH CV ECHO MEAS - AVA(V,D): 3.1 CM^2
BH CV ECHO MEAS - BSA(HAYCOCK): 2.3 M^2
BH CV ECHO MEAS - BSA: 2.1 M^2
BH CV ECHO MEAS - BZI_BMI: 39.1 KILOGRAMS/M^2
BH CV ECHO MEAS - BZI_METRIC_HEIGHT: 165.1 CM
BH CV ECHO MEAS - BZI_METRIC_WEIGHT: 106.6 KG
BH CV ECHO MEAS - CI(AO): 6.5 L/MIN/M^2
BH CV ECHO MEAS - CI(LVOT): 1.8 L/MIN/M^2
BH CV ECHO MEAS - CO(AO): 13.8 L/MIN
BH CV ECHO MEAS - CO(LVOT): 3.8 L/MIN
BH CV ECHO MEAS - EDV(CUBED): 47.1 ML
BH CV ECHO MEAS - EDV(MOD-SP4): 51.9 ML
BH CV ECHO MEAS - EDV(TEICH): 54.8 ML
BH CV ECHO MEAS - EF(CUBED): 62.6 %
BH CV ECHO MEAS - EF(MOD-BP): 65 %
BH CV ECHO MEAS - EF(MOD-SP4): 64.9 %
BH CV ECHO MEAS - EF(TEICH): 55 %
BH CV ECHO MEAS - ESV(CUBED): 17.6 ML
BH CV ECHO MEAS - ESV(MOD-SP4): 18.2 ML
BH CV ECHO MEAS - ESV(TEICH): 24.6 ML
BH CV ECHO MEAS - FS: 27.9 %
BH CV ECHO MEAS - IVS/LVPW: 0.74
BH CV ECHO MEAS - IVSD: 0.92 CM
BH CV ECHO MEAS - LA DIMENSION(2D): 3.4 CM
BH CV ECHO MEAS - LV DIASTOLIC VOL/BSA (35-75): 24.5 ML/M^2
BH CV ECHO MEAS - LV MASS(C)D: 121.6 GRAMS
BH CV ECHO MEAS - LV MASS(C)DI: 57.4 GRAMS/M^2
BH CV ECHO MEAS - LV MAX PG: 3.4 MMHG
BH CV ECHO MEAS - LV MEAN PG: 1.6 MMHG
BH CV ECHO MEAS - LV SYSTOLIC VOL/BSA (12-30): 8.6 ML/M^2
BH CV ECHO MEAS - LV V1 MAX: 91.9 CM/SEC
BH CV ECHO MEAS - LV V1 MEAN: 59.4 CM/SEC
BH CV ECHO MEAS - LV V1 VTI: 21.3 CM
BH CV ECHO MEAS - LVIDD: 3.6 CM
BH CV ECHO MEAS - LVIDS: 2.6 CM
BH CV ECHO MEAS - LVOT AREA: 3.1 CM^2
BH CV ECHO MEAS - LVOT DIAM: 2 CM
BH CV ECHO MEAS - LVPWD: 1.2 CM
BH CV ECHO MEAS - MR MAX PG: 135.1 MMHG
BH CV ECHO MEAS - MR MAX VEL: 581.1 CM/SEC
BH CV ECHO MEAS - MV A MAX VEL: 127 CM/SEC
BH CV ECHO MEAS - MV DEC SLOPE: 492.7 CM/SEC^2
BH CV ECHO MEAS - MV DEC TIME: 0.15 SEC
BH CV ECHO MEAS - MV E MAX VEL: 75.8 CM/SEC
BH CV ECHO MEAS - MV E/A: 0.6
BH CV ECHO MEAS - MV MAX PG: 9.4 MMHG
BH CV ECHO MEAS - MV MEAN PG: 3.4 MMHG
BH CV ECHO MEAS - MV V2 MAX: 153.7 CM/SEC
BH CV ECHO MEAS - MV V2 MEAN: 86.1 CM/SEC
BH CV ECHO MEAS - MV V2 VTI: 36.7 CM
BH CV ECHO MEAS - MVA(VTI): 1.8 CM^2
BH CV ECHO MEAS - PA ACC TIME: 0.13 SEC
BH CV ECHO MEAS - PA MAX PG (FULL): 1.5 MMHG
BH CV ECHO MEAS - PA MAX PG: 2.6 MMHG
BH CV ECHO MEAS - PA MEAN PG (FULL): 0.9 MMHG
BH CV ECHO MEAS - PA MEAN PG: 1.4 MMHG
BH CV ECHO MEAS - PA PR(ACCEL): 20.8 MMHG
BH CV ECHO MEAS - PA V2 MAX: 81 CM/SEC
BH CV ECHO MEAS - PA V2 MEAN: 54.6 CM/SEC
BH CV ECHO MEAS - PA V2 VTI: 18.9 CM
BH CV ECHO MEAS - PVA(I,A): 2.1 CM^2
BH CV ECHO MEAS - PVA(I,D): 2.1 CM^2
BH CV ECHO MEAS - PVA(V,A): 2.1 CM^2
BH CV ECHO MEAS - PVA(V,D): 2.1 CM^2
BH CV ECHO MEAS - QP/QS: 0.6
BH CV ECHO MEAS - RAP SYSTOLE: 3 MMHG
BH CV ECHO MEAS - RV MAX PG: 1.2 MMHG
BH CV ECHO MEAS - RV MEAN PG: 0.5 MMHG
BH CV ECHO MEAS - RV V1 MAX: 54.2 CM/SEC
BH CV ECHO MEAS - RV V1 MEAN: 31.7 CM/SEC
BH CV ECHO MEAS - RV V1 VTI: 12.8 CM
BH CV ECHO MEAS - RVDD: 2.5 CM
BH CV ECHO MEAS - RVOT AREA: 3.1 CM^2
BH CV ECHO MEAS - RVOT DIAM: 2 CM
BH CV ECHO MEAS - RVSP: 29.3 MMHG
BH CV ECHO MEAS - SI(AO): 113 ML/M^2
BH CV ECHO MEAS - SI(CUBED): 13.9 ML/M^2
BH CV ECHO MEAS - SI(LVOT): 31.5 ML/M^2
BH CV ECHO MEAS - SI(MOD-SP4): 15.9 ML/M^2
BH CV ECHO MEAS - SI(TEICH): 14.2 ML/M^2
BH CV ECHO MEAS - SV(AO): 239.3 ML
BH CV ECHO MEAS - SV(CUBED): 29.5 ML
BH CV ECHO MEAS - SV(LVOT): 66.6 ML
BH CV ECHO MEAS - SV(MOD-SP4): 33.7 ML
BH CV ECHO MEAS - SV(RVOT): 40.1 ML
BH CV ECHO MEAS - SV(TEICH): 30.2 ML
BH CV ECHO MEAS - TR MAX VEL: 256.4 CM/SEC
BUN SERPL-MCNC: 29 MG/DL (ref 8–23)
BUN/CREAT SERPL: 16.6 (ref 7–25)
CALCIUM SPEC-SCNC: 9.1 MG/DL (ref 8.6–10.5)
CHLORIDE SERPL-SCNC: 97 MMOL/L (ref 98–107)
CO2 SERPL-SCNC: 24 MMOL/L (ref 22–29)
CREAT SERPL-MCNC: 1.75 MG/DL (ref 0.57–1)
DEPRECATED RDW RBC AUTO: 50.3 FL (ref 37–54)
EOSINOPHIL # BLD AUTO: 0 10*3/MM3 (ref 0–0.4)
EOSINOPHIL NFR BLD AUTO: 0 % (ref 0.3–6.2)
ERYTHROCYTE [DISTWIDTH] IN BLOOD BY AUTOMATED COUNT: 15.1 % (ref 12.3–15.4)
GFR SERPL CREATININE-BSD FRML MDRD: 28 ML/MIN/1.73
GLUCOSE BLDC GLUCOMTR-MCNC: 257 MG/DL (ref 70–105)
GLUCOSE BLDC GLUCOMTR-MCNC: 293 MG/DL (ref 70–105)
GLUCOSE BLDC GLUCOMTR-MCNC: 394 MG/DL (ref 70–105)
GLUCOSE SERPL-MCNC: 324 MG/DL (ref 65–99)
HBA1C MFR BLD: 7.4 % (ref 3.5–5.6)
HCT VFR BLD AUTO: 34.9 % (ref 34–46.6)
HGB BLD-MCNC: 12.1 G/DL (ref 12–15.9)
LYMPHOCYTES # BLD AUTO: 0.7 10*3/MM3 (ref 0.7–3.1)
LYMPHOCYTES NFR BLD AUTO: 8.2 % (ref 19.6–45.3)
MAGNESIUM SERPL-MCNC: 1.8 MG/DL (ref 1.6–2.4)
MCH RBC QN AUTO: 32.7 PG (ref 26.6–33)
MCHC RBC AUTO-ENTMCNC: 34.7 G/DL (ref 31.5–35.7)
MCV RBC AUTO: 94.3 FL (ref 79–97)
MONOCYTES # BLD AUTO: 0.1 10*3/MM3 (ref 0.1–0.9)
MONOCYTES NFR BLD AUTO: 1.1 % (ref 5–12)
NEUTROPHILS NFR BLD AUTO: 7.3 10*3/MM3 (ref 1.7–7)
NEUTROPHILS NFR BLD AUTO: 90.5 % (ref 42.7–76)
NRBC BLD AUTO-RTO: 0.1 /100 WBC (ref 0–0.2)
PLATELET # BLD AUTO: 227 10*3/MM3 (ref 140–450)
PMV BLD AUTO: 8.3 FL (ref 6–12)
POTASSIUM SERPL-SCNC: 3.7 MMOL/L (ref 3.5–5.2)
RBC # BLD AUTO: 3.7 10*6/MM3 (ref 3.77–5.28)
SODIUM SERPL-SCNC: 136 MMOL/L (ref 136–145)
URATE SERPL-MCNC: 6.3 MG/DL (ref 2.4–5.7)
WBC NRBC COR # BLD: 8.1 10*3/MM3 (ref 3.4–10.8)

## 2021-12-15 PROCEDURE — 94664 DEMO&/EVAL PT USE INHALER: CPT

## 2021-12-15 PROCEDURE — 63710000001 INSULIN GLARGINE PER 5 UNITS: Performed by: NURSE PRACTITIONER

## 2021-12-15 PROCEDURE — 85025 COMPLETE CBC W/AUTO DIFF WBC: CPT | Performed by: NURSE PRACTITIONER

## 2021-12-15 PROCEDURE — 94799 UNLISTED PULMONARY SVC/PX: CPT

## 2021-12-15 PROCEDURE — 25010000002 METHYLPREDNISOLONE PER 40 MG: Performed by: NURSE PRACTITIONER

## 2021-12-15 PROCEDURE — 25010000002 FUROSEMIDE PER 20 MG: Performed by: NURSE PRACTITIONER

## 2021-12-15 PROCEDURE — 99222 1ST HOSP IP/OBS MODERATE 55: CPT | Performed by: INTERNAL MEDICINE

## 2021-12-15 PROCEDURE — 25010000002 HEPARIN (PORCINE) PER 1000 UNITS: Performed by: NURSE PRACTITIONER

## 2021-12-15 PROCEDURE — 84550 ASSAY OF BLOOD/URIC ACID: CPT | Performed by: NURSE PRACTITIONER

## 2021-12-15 PROCEDURE — 82962 GLUCOSE BLOOD TEST: CPT

## 2021-12-15 PROCEDURE — 99232 SBSQ HOSP IP/OBS MODERATE 35: CPT | Performed by: INTERNAL MEDICINE

## 2021-12-15 PROCEDURE — 93306 TTE W/DOPPLER COMPLETE: CPT

## 2021-12-15 PROCEDURE — 63710000001 INSULIN LISPRO (HUMAN) PER 5 UNITS: Performed by: NURSE PRACTITIONER

## 2021-12-15 PROCEDURE — 80048 BASIC METABOLIC PNL TOTAL CA: CPT | Performed by: NURSE PRACTITIONER

## 2021-12-15 PROCEDURE — 83735 ASSAY OF MAGNESIUM: CPT | Performed by: NURSE PRACTITIONER

## 2021-12-15 RX ORDER — METOPROLOL TARTRATE 50 MG/1
50 TABLET, FILM COATED ORAL 2 TIMES DAILY
Status: DISCONTINUED | OUTPATIENT
Start: 2021-12-15 | End: 2021-12-17 | Stop reason: HOSPADM

## 2021-12-15 RX ORDER — ASPIRIN 81 MG/1
81 TABLET ORAL DAILY
Status: DISCONTINUED | OUTPATIENT
Start: 2021-12-15 | End: 2021-12-17 | Stop reason: HOSPADM

## 2021-12-15 RX ORDER — FAMOTIDINE 20 MG/1
20 TABLET, FILM COATED ORAL NIGHTLY
Status: DISCONTINUED | OUTPATIENT
Start: 2021-12-15 | End: 2021-12-17 | Stop reason: HOSPADM

## 2021-12-15 RX ORDER — CLOPIDOGREL BISULFATE 75 MG/1
75 TABLET ORAL DAILY
Status: DISCONTINUED | OUTPATIENT
Start: 2021-12-15 | End: 2021-12-17 | Stop reason: HOSPADM

## 2021-12-15 RX ORDER — INSULIN GLARGINE 100 [IU]/ML
10 INJECTION, SOLUTION SUBCUTANEOUS DAILY
Status: DISCONTINUED | OUTPATIENT
Start: 2021-12-15 | End: 2021-12-16

## 2021-12-15 RX ORDER — PANTOPRAZOLE SODIUM 40 MG/1
40 TABLET, DELAYED RELEASE ORAL DAILY
Status: DISCONTINUED | OUTPATIENT
Start: 2021-12-15 | End: 2021-12-17 | Stop reason: HOSPADM

## 2021-12-15 RX ORDER — NYSTATIN 100000 [USP'U]/G
POWDER TOPICAL 3 TIMES DAILY
Status: DISCONTINUED | OUTPATIENT
Start: 2021-12-15 | End: 2021-12-17 | Stop reason: HOSPADM

## 2021-12-15 RX ADMIN — METHYLPREDNISOLONE SODIUM SUCCINATE 40 MG: 40 INJECTION, POWDER, FOR SOLUTION INTRAMUSCULAR; INTRAVENOUS at 11:39

## 2021-12-15 RX ADMIN — METHYLPREDNISOLONE SODIUM SUCCINATE 40 MG: 40 INJECTION, POWDER, FOR SOLUTION INTRAMUSCULAR; INTRAVENOUS at 21:15

## 2021-12-15 RX ADMIN — HEPARIN SODIUM 5000 UNITS: 5000 INJECTION INTRAVENOUS; SUBCUTANEOUS at 08:27

## 2021-12-15 RX ADMIN — FAMOTIDINE 20 MG: 20 TABLET ORAL at 21:22

## 2021-12-15 RX ADMIN — INSULIN LISPRO 8 UNITS: 100 INJECTION, SOLUTION INTRAVENOUS; SUBCUTANEOUS at 08:28

## 2021-12-15 RX ADMIN — INSULIN GLARGINE 10 UNITS: 100 INJECTION, SOLUTION SUBCUTANEOUS at 08:28

## 2021-12-15 RX ADMIN — NYSTATIN: 100000 POWDER TOPICAL at 08:28

## 2021-12-15 RX ADMIN — HEPARIN SODIUM 5000 UNITS: 5000 INJECTION INTRAVENOUS; SUBCUTANEOUS at 21:15

## 2021-12-15 RX ADMIN — SODIUM CHLORIDE, PRESERVATIVE FREE 10 ML: 5 INJECTION INTRAVENOUS at 21:16

## 2021-12-15 RX ADMIN — METOPROLOL TARTRATE 50 MG: 50 TABLET, FILM COATED ORAL at 21:15

## 2021-12-15 RX ADMIN — NYSTATIN: 100000 POWDER TOPICAL at 16:49

## 2021-12-15 RX ADMIN — FAMOTIDINE 20 MG: 20 TABLET ORAL at 05:31

## 2021-12-15 RX ADMIN — SODIUM CHLORIDE, PRESERVATIVE FREE 10 ML: 5 INJECTION INTRAVENOUS at 08:27

## 2021-12-15 RX ADMIN — IPRATROPIUM BROMIDE AND ALBUTEROL SULFATE 3 ML: 2.5; .5 SOLUTION RESPIRATORY (INHALATION) at 12:43

## 2021-12-15 RX ADMIN — CARBIDOPA AND LEVODOPA 1 TABLET: 25; 100 TABLET ORAL at 21:15

## 2021-12-15 RX ADMIN — IPRATROPIUM BROMIDE AND ALBUTEROL SULFATE 3 ML: 2.5; .5 SOLUTION RESPIRATORY (INHALATION) at 20:35

## 2021-12-15 RX ADMIN — INSULIN LISPRO 8 UNITS: 100 INJECTION, SOLUTION INTRAVENOUS; SUBCUTANEOUS at 11:39

## 2021-12-15 RX ADMIN — FUROSEMIDE 40 MG: 10 INJECTION, SOLUTION INTRAMUSCULAR; INTRAVENOUS at 16:49

## 2021-12-15 RX ADMIN — ASPIRIN 81 MG: 81 TABLET, COATED ORAL at 08:26

## 2021-12-15 RX ADMIN — CLOPIDOGREL BISULFATE 75 MG: 75 TABLET ORAL at 08:26

## 2021-12-15 RX ADMIN — FUROSEMIDE 40 MG: 10 INJECTION, SOLUTION INTRAMUSCULAR; INTRAVENOUS at 05:31

## 2021-12-15 RX ADMIN — METHYLPREDNISOLONE SODIUM SUCCINATE 40 MG: 40 INJECTION, POWDER, FOR SOLUTION INTRAMUSCULAR; INTRAVENOUS at 05:30

## 2021-12-15 RX ADMIN — IPRATROPIUM BROMIDE AND ALBUTEROL SULFATE 3 ML: 2.5; .5 SOLUTION RESPIRATORY (INHALATION) at 16:57

## 2021-12-15 RX ADMIN — PANTOPRAZOLE SODIUM 40 MG: 40 TABLET, DELAYED RELEASE ORAL at 08:26

## 2021-12-15 RX ADMIN — CARBIDOPA AND LEVODOPA 1 TABLET: 25; 100 TABLET ORAL at 08:26

## 2021-12-15 RX ADMIN — METOPROLOL TARTRATE 50 MG: 50 TABLET, FILM COATED ORAL at 08:26

## 2021-12-15 RX ADMIN — INSULIN LISPRO 12 UNITS: 100 INJECTION, SOLUTION INTRAVENOUS; SUBCUTANEOUS at 19:52

## 2021-12-15 NOTE — H&P
[facility] Medicine Services      Patient Name: Nory Duff  : 1940  MRN: [id number]  Primary Care Physician:  Giovani Mejia MD  Date of admission: 2021      Subjective      Chief Complaint: Shortness of air    History of Present Illness: Nory Duff is a 81 y.o. female with past medical history of type 2 diabetes mellitus, hyperlipidemia, hypertension, COPD, dementia who presented from the [facility] care facility to [facility] on 2021 with reports of shortness of breath this afternoon.  There are current Covid cases at the facility.  According to staff at the [facility], she had a rapid screen this morning that was negative.  EMS reports that she was wheezing when they arrived at the facility, they gave her a DuoNeb treatment and she was placed on supplemental oxygen in route which improved her symptoms.  Patient is a poor historian due to dementia.    In the ED, chest x-ray showed cardiomegaly with small pleural effusions and ill-defined pulmonary vessels, question pulmonary edema; bibasilar airspace opacity worse right than left question subsegmental atelectasis, pneumonia less likely; large hiatal hernia.  EKG showed sinus rhythm.  All labs unremarkable except proBNP 7496, troponin 0.097, glucose 174, creatinine 1.69, WBC 11.6, hemoglobin 11.9.  All vital signs unremarkable.  Patient received albuterol inhaler, aspirin, Lasix, Solu-Medrol in the ED.  Patient admitted to hospitalist service for further evaluation and treatment.    Review of Systems   Unable to perform ROS: dementia        Personal History     Past Medical History:   Diagnosis Date   • Anemia    • Atherosclerotic heart disease of native coronary artery without angina pectoris    • COPD (chronic obstructive pulmonary disease) (HCC)    • COVID-19 2020   • Dementia (HCC)    • Diabetes mellitus (HCC)    • GERD (gastroesophageal reflux disease)    • Gout    •  Hyperlipidemia    • Hypertension    • Localized edema    • Parkinson's disease (HCC)    • Vitamin D deficiency        Past Surgical History:   Procedure Laterality Date   • ENDOSCOPY N/A 1/2/2021    Procedure: ESOPHAGOGASTRODUODENOSCOPY;  Surgeon: Yoan Mendoza MD;  Location: [facility] ENDOSCOPY;  Service: Gastroenterology;  Laterality: N/A;  post: esophageal trauma from nasogastric tube, large hiatal hernia       Family History: Family history is unknown by patient. Otherwise pertinent FHx was reviewed and not pertinent to current issue.    Social History:  reports that she has never smoked. She has never used smokeless tobacco. She reports that she does not drink alcohol and does not use drugs.    Home Medications:  Prior to Admission Medications     Prescriptions Last Dose Informant Patient Reported? Taking?    acetaminophen (TYLENOL) 325 MG tablet  Nursing Home Yes No    Take 650 mg by mouth Every 6 (Six) Hours As Needed for Mild Pain  or Fever.    allopurinol (ZYLOPRIM) 100 MG tablet  Nursing Home Yes No    Take 200 mg by mouth Daily.    aspirin 81 MG EC tablet  Nursing Home Yes No    Take 81 mg by mouth Daily.    carbidopa-levodopa (SINEMET)  MG per tablet  Nursing Home Yes No    Take 1 tablet by mouth 3 (Three) Times a Day.    cholecalciferol (VITAMIN D3) 25 MCG (1000 UT) tablet  Nursing Home Yes No    Take 2,000 Units by mouth Daily.    clopidogrel (PLAVIX) 75 MG tablet  Nursing Home Yes No    Take 75 mg by mouth Daily.    docusate sodium (COLACE) 100 MG capsule  Nursing Home Yes No    Take 100 mg by mouth 2 (Two) Times a Day.    Glucagon, rDNA, (Glucagon Emergency) 1 MG kit  Nursing Home Yes No    Inject  as directed As Needed.    insulin glargine (LANTUS, SEMGLEE) 100 UNIT/ML injection  Nursing Home Yes No    Inject 18 Units under the skin into the appropriate area as directed 2 (Two) Times a Day.    magnesium hydroxide (MILK OF MAGNESIA) 400 MG/5ML suspension  Nursing Home Yes No    Take 30 mL by  mouth Daily As Needed for Constipation.    metoprolol tartrate (LOPRESSOR) 50 MG tablet  Nursing Home Yes No    Take 50 mg by mouth 2 (Two) Times a Day.    nystatin (MYCOSTATIN) 511986 UNIT/GM powder  Nursing Home Yes No    Apply  topically to the appropriate area as directed 3 (Three) Times a Day. AA    pantoprazole (PROTONIX) 40 MG EC tablet   No No    Take 1 tablet by mouth Daily.            Allergies:  Allergies   Allergen Reactions   • Codeine Anaphylaxis   • Latex Anaphylaxis   • Namenda [Memantine] Anaphylaxis       Objective      Vitals:   Temp:  [97.6 °F (36.4 °C)] 97.6 °F (36.4 °C)  Heart Rate:  [69-72] 72  Resp:  [20-22] 20  BP: (108-153)/(70-77) 153/74  Flow (L/min):  [2-6] 2    Physical Exam  Vitals and nursing note reviewed.   Constitutional:       Appearance: Normal appearance.   HENT:      Head: Normocephalic.      Nose: Nose normal.      Mouth/Throat:      Pharynx: Oropharynx is clear.   Eyes:      Extraocular Movements: Extraocular movements intact.   Cardiovascular:      Rate and Rhythm: Normal rate and regular rhythm.      Pulses: Normal pulses.      Heart sounds: Normal heart sounds.   Pulmonary:      Effort: Pulmonary effort is normal.      Breath sounds: Wheezing present.   Abdominal:      General: Bowel sounds are normal.      Palpations: Abdomen is soft.   Musculoskeletal:         General: Normal range of motion.      Cervical back: Normal range of motion.      Right lower leg: Edema present.      Left lower leg: Edema present.   Skin:     General: Skin is warm and dry.      Comments: Feet are dry and slightly reddened, with flaking skin noted   Neurological:      Mental Status: She is alert. She is disoriented.   Psychiatric:         Mood and Affect: Mood normal.         Behavior: Behavior normal.        Result Review    Result Review:  I have personally reviewed the results from the time of this admission to 12/14/2021 21:07 EST and agree with these findings:  [x]  Laboratory  []   Microbiology  [x]  Radiology  [x]  EKG/Telemetry   []  Cardiology/Vascular   []  Pathology  []  Old records  []  Other:  Most notable findings include: As above      Assessment/Plan        Active Hospital Problems:  Active Hospital Problems    Diagnosis    • **Chronic obstructive pulmonary disease with acute exacerbation ([facility])    • Chronic respiratory failure with hypoxia ([facility])    • Obesity (BMI 30-39.9)    • GERD (gastroesophageal reflux disease)    • Hypertension    • Dementia ([facility])    • Atherosclerotic heart disease of native coronary artery without angina pectoris    • Gout    • Parkinson's disease ([facility])    • Cardiomegaly    • CKD (chronic kidney disease) stage 4, GFR 15-29 ml/min ([facility])    • Type 2 diabetes mellitus without complication, with long-term current use of insulin ([facility])    • Hyperlipidemia      Plan:     Chronic obstructive pulmonary disease with acute exacerbation   -Chest x-ray reviewed  -EKG reviewed  -WBC 11.6  -Respiratory panel/Covid negative  -Continuous cardiac monitoring  -Continuous pulse ox  -ABG ordered  -D-dimer ordered  -Procalcitonin, CRP ordered  -Consider doxycycline or azithromycin if CRP/procalcitonin elevated  -Nebulizer treatments ordered  -Solumedrol ordered    Cardiomegaly  -Chest x-ray reviewed  -EKG reviewed  -proBNP 7496  -Troponin 0.097  -Serial troponins ordered  -Cardiology consulted  -Daily weights  -Strict intake and output  -TSH ordered  -Magnesium ordered  -Echo ordered  -Lasix event in the ED, continue    Type 2 diabetes mellitus without complication, with long-term current use of insulin  -Glucose 173  -Hemoglobin A1c ordered  -Accu-Cheks before meals and at bedtime  -Sliding scale insulin ordered  -Continue home Lantus    Hypertension   Hyperlipidemia  -BP well controlled  -Lipid panel ordered  -Continue home Lopressor    CKD (chronic kidney disease) stage 4, GFR 15-29 ml/min   -Creatinine 1.69, baseline 1.42-1.51  -GFR 29, baseline 33-35    GERD (gastroesophageal  reflux disease)  -Continue home Pepcid, Protonix    Dementia  -Disoriented  -Fall precautions    Atherosclerotic heart disease of native coronary artery without angina pectoris  -Continue home Plavix, aspirin, metoprolol    Gout  -Uric acid level ordered  -Hold home allopurinol     Parkinson's disease  -Continue home Sinemet    Anemia  -Hemoglobin 11.9  -Monitor    Obesity (BMI 30-39.9)  -BMI 39.11  -Encourage lifestyle modifications    DVT prophylaxis:  Medical DVT prophylaxis orders are present.    CODE STATUS:       Admission Status:  I believe this patient meets inpatient status.    I discussed the patient's findings and my recommendations with patient.    This patient has been examined wearing appropriate Personal Protective Equipment. 12/14/21      Signature: Electronically signed by MAE Stallworth, 12/14/21, 11:54 PM EST.

## 2021-12-15 NOTE — PROGRESS NOTES
[facility] Medicine Services Daily Progress Note    Patient Name: Nory Duff  : 1940  MRN: [id number]  Primary Care Physician:  Giovani Mejia MD  Date of admission: 2021    Seen  Subjective      Chief Complaint:   Shortness of breath    Brief interim history:81-year-old pleasant female with background history of T2DM, hyperlipidemia, COPD, Parkinson disease, gouty arthritis dementia--> who presented from [facility] on 2021 with progressively worsening shortness of breath.  Of note, there Covid outbreak and the facility where patient is staying currently.  However, patient reportedly tested negative for Covid-19 [facility].  She is noted with diffuse expiratory wheezing and receive bronchodilator with DuoNeb and placed on supplemental oxygen in route to [facility] ED with some improvement.  Chest x-ray showed cardiomegaly with small pleural effusion and ill-defined pulmonary vessel correction for pulmonary edema with bibasilar airspace opacity worse on the right than left with questionable segmental atelectasis, pneumonia less likely, and a large hiatal hernia..  Laboratory is mostly unremarkable except for modestly elevated proBNP of 7496 and troponin of 0.097.  Patient is admitted with acute hypoxic respiratory failure, likely secondary to acute on chronic COPD exacerbation, and decompensated HF  .  Objective    Seen and examined in follow evaluation.  Reports feeling better and resting comfortably in bed in no acute distress or discomfort.  Vitals:   Temp:  [97.2 °F (36.2 °C)-98.2 °F (36.8 °C)] 98.2 °F (36.8 °C)  Heart Rate:  [49-72] 49  Resp:  [13-22] 16  BP: (108-153)/(46-77) 114/46  Flow (L/min):  [2-6] 2    Physical Exam  Constitutional:       General: She is not in acute distress.     Appearance: Normal appearance. She is obese. She is not ill-appearing, toxic-appearing or diaphoretic.   HENT:      Head: Normocephalic.      Right Ear: Tympanic  membrane normal.      Nose: Nose normal.      Mouth/Throat:      Mouth: Mucous membranes are moist.   Eyes:      Pupils: Pupils are equal, round, and reactive to light.   Cardiovascular:      Rate and Rhythm: Normal rate.      Pulses: Normal pulses.   Pulmonary:      Effort: No respiratory distress.      Breath sounds: No stridor. Wheezing present. No rales.   Chest:      Chest wall: No tenderness.   Abdominal:      General: Abdomen is flat.   Musculoskeletal:         General: Normal range of motion.      Cervical back: Normal range of motion. No rigidity.   Skin:     General: Skin is warm.      Capillary Refill: Capillary refill takes less than 2 seconds.   Neurological:      Mental Status: She is alert. Mental status is at baseline.   Psychiatric:         Mood and Affect: Mood normal.               Result Review    Result Review:  I have personally reviewed the results from the time of this admission to 12/15/2021 14:59 EST and agree with these findings:  []  Laboratory  []  Microbiology  []  Radiology  []  EKG/Telemetry   []  Cardiology/Vascular   []  Pathology  []  Old records  []  Other:          Assessment/Plan      albuterol, 2.5 mg, Nebulization, Once  aspirin, 81 mg, Oral, Daily  carbidopa-levodopa, 1 tablet, Oral, TID  clopidogrel, 75 mg, Oral, Daily  famotidine, 20 mg, Oral, Nightly  furosemide, 40 mg, Intravenous, Q12H  heparin (porcine), 5,000 Units, Subcutaneous, Q12H  insulin glargine, 10 Units, Subcutaneous, Daily  insulin lispro, 0-14 Units, Subcutaneous, TID AC  ipratropium-albuterol, 3 mL, Nebulization, 4x Daily - RT  methylPREDNISolone sodium succinate, 40 mg, Intravenous, Q8H  metoprolol tartrate, 50 mg, Oral, BID  nystatin, , Topical, TID  pantoprazole, 40 mg, Oral, Daily  sodium chloride, 10 mL, Intravenous, Q12H             Active Hospital Problems:  Active Hospital Problems    Diagnosis    • **Chronic obstructive pulmonary disease with acute exacerbation (HCC)    • Chronic respiratory  failure with hypoxia ([facility])    • Obesity (BMI 30-39.9)    • GERD (gastroesophageal reflux disease)    • Hypertension    • Dementia ([facility])    • Atherosclerotic heart disease of native coronary artery without angina pectoris    • Gout    • Parkinson's disease ([facility])    • Cardiomegaly    • CKD (chronic kidney disease) stage 4, GFR 15-29 ml/min ([facility])    • Type 2 diabetes mellitus without complication, with long-term current use of insulin ([facility])    • Hyperlipidemia        Acute hypoxic respiratory failure     -Likely multifactorial and 2/2 acute on chronic COPD exacerbation and decompensated heart failure    Acute on chronic COPD exacerbation     -on supplemental oxygen via nasal cannula, bronchodilator, Solu-Medrol    Elevated proBNP/probable HFpEF      -cont gentle diuresis with Lasix,/metoprolol      -2D echo showed preserved LV function with estimated EF of 65%    T2DM      -SSI/Lantus    Hypertension      -on metoprolol/Lasix    History of Parkinson disease      -Sentiment    Alzheimer dementia     -continue redirection/reorientation and fall precautions    Obesity, BMI of 39    CKD, stage IV      -monitor renal indices closely      DVT prophylaxis:  Medical DVT prophylaxis orders are present.    CODE STATUS:    Code Status (Patient has no pulse and is not breathing): CPR (Attempt to Resuscitate)  Medical Interventions (Patient has pulse or is breathing): Full Support      Disposition:  I expect patient to be discharged 24 to 48 hours.          Electronically signed by Robert Puentes MD, 12/15/21, 14:59 EST.  [facility]

## 2021-12-15 NOTE — CONSULTS
Referring Provider: Hospitalist  Reason for Consultation: Shortness of breath    Patient Care Team:  Giovani Mejia MD as PCP - General (Geriatric Medicine)    Chief complaint shortness of breath    Subjective .     History of present illness:  Nory Duff is a 81 y.o. female with history of coronary disease COPD diabetes hypertension hyperlipidemia and dementia presented to the hospital complains of increasing shortness of breath.  Patient was in the facility where there was a Covid outbreak and hence she was sent to our facility.  Patient does not have any symptoms of chest pain.  No complains any PND orthopnea.  No palpitation dizziness syncope.  She has some swelling of the feet.  In the ER patient had a pro BNP level which was elevated.  Troponin was mildly elevated.  She was admitted and cardiology consult was called.  Review of Systems   Constitutional: Negative for fever and malaise/fatigue.   HENT: Negative for ear pain and nosebleeds.    Eyes: Negative for blurred vision and double vision.   Cardiovascular: Positive for leg swelling. Negative for chest pain, dyspnea on exertion and palpitations.   Respiratory: Positive for shortness of breath. Negative for cough.    Skin: Negative for rash.   Musculoskeletal: Negative for joint pain.   Gastrointestinal: Negative for abdominal pain, nausea and vomiting.   Neurological: Negative for focal weakness and headaches.   Psychiatric/Behavioral: Negative for depression. The patient is not nervous/anxious.    All other systems reviewed and are negative.      History  Past Medical History:   Diagnosis Date   • Anemia    • Atherosclerotic heart disease of native coronary artery without angina pectoris    • COPD (chronic obstructive pulmonary disease) (HCC)    • COVID-19 03/2020   • Dementia (HCC)    • Diabetes mellitus (HCC)    • GERD (gastroesophageal reflux disease)    • Gout    • Hyperlipidemia    • Hypertension    • Localized edema    • Parkinson's  disease (HCC)    • Vitamin D deficiency        Past Surgical History:   Procedure Laterality Date   • ENDOSCOPY N/A 1/2/2021    Procedure: ESOPHAGOGASTRODUODENOSCOPY;  Surgeon: Yoan Mendoza MD;  Location: [facility] ENDOSCOPY;  Service: Gastroenterology;  Laterality: N/A;  post: esophageal trauma from nasogastric tube, large hiatal hernia       Family History   Family history unknown: Yes       Social History     Tobacco Use   • Smoking status: Never Smoker   • Smokeless tobacco: Never Used   Vaping Use   • Vaping Use: Not on file   Substance Use Topics   • Alcohol use: Never   • Drug use: Never        (Not in a hospital admission)        Codeine, Latex, and Namenda [memantine]    Scheduled Meds:albuterol, 2.5 mg, Nebulization, Once  aspirin, 81 mg, Oral, Daily  carbidopa-levodopa, 1 tablet, Oral, TID  clopidogrel, 75 mg, Oral, Daily  famotidine, 20 mg, Oral, Nightly  furosemide, 40 mg, Intravenous, Q12H  heparin (porcine), 5,000 Units, Subcutaneous, Q12H  insulin glargine, 10 Units, Subcutaneous, Daily  insulin lispro, 0-14 Units, Subcutaneous, TID AC  ipratropium-albuterol, 3 mL, Nebulization, 4x Daily - RT  methylPREDNISolone sodium succinate, 40 mg, Intravenous, Q8H  metoprolol tartrate, 50 mg, Oral, BID  nystatin, , Topical, TID  pantoprazole, 40 mg, Oral, Daily  sodium chloride, 10 mL, Intravenous, Q12H      Continuous Infusions:   PRN Meds:.•  acetaminophen **OR** acetaminophen **OR** acetaminophen  •  dextrose  •  dextrose  •  glucagon (human recombinant)  •  insulin lispro **AND** insulin lispro  •  ipratropium-albuterol  •  magnesium hydroxide  •  magnesium sulfate **OR** magnesium sulfate in D5W 1g/100mL (PREMIX)  •  nitroglycerin  •  ondansetron **OR** ondansetron  •  potassium chloride  •  potassium chloride  •  [COMPLETED] Insert peripheral IV **AND** sodium chloride  •  sodium chloride    Objective     VITAL SIGNS  Vitals:    12/15/21 1249 12/15/21 1527 12/15/21 1657 12/15/21 1702   BP:  102/55     BP  "Location:  Left arm     Patient Position:  Lying     Pulse: (!) 49 71 66 67   Resp: 16 15 11 16   Temp:  97.5 °F (36.4 °C)     TempSrc:  Axillary     SpO2: 99% 100% 97% 100%   Weight:       Height:           Flowsheet Rows      First Filed Value   Admission Height 165.1 cm (65\") Documented at 12/14/2021 1546   Admission Weight 107 kg (235 lb) Documented at 12/14/2021 1546           TELEMETRY: Sinus rhythm with nonspecific ST segment abnormality    Physical Exam:  Constitutional:       Appearance: Well-developed.   Eyes:      General: No scleral icterus.     Conjunctiva/sclera: Conjunctivae normal.      Pupils: Pupils are equal, round, and reactive to light.   HENT:      Head: Normocephalic and atraumatic.   Neck:      Vascular: No carotid bruit or JVD.   Pulmonary:      Effort: Pulmonary effort is normal.      Breath sounds: Wheezing present. No rales.   Cardiovascular:      Normal rate. Regular rhythm.      Murmurs: There is a systolic murmur.   Pulses:     Intact distal pulses.   Edema:     Peripheral edema present.  Abdominal:      General: Bowel sounds are normal.      Palpations: Abdomen is soft.   Musculoskeletal: Normal range of motion.      Cervical back: Normal range of motion and neck supple. Skin:     General: Skin is warm and dry.      Findings: No rash.   Neurological:      Mental Status: Alert.      Comments: No focal deficits          Results Review:   I reviewed the patient's new clinical results.  Lab Results (last 24 hours)     Procedure Component Value Units Date/Time    POC Glucose Once [[id number]]  (Abnormal) Collected: 12/15/21 1134    Specimen: Blood Updated: 12/15/21 1135     Glucose 257 mg/dL      Comment: Serial Number: [id number]:  [id number]       Hemoglobin A1c [[id number]]  (Abnormal) Collected: 12/14/21 2140    Specimen: Blood Updated: 12/15/21 1005     Hemoglobin A1C 7.4 %     Narrative:      Hemoglobin A1C Reference Range:    <5.7 %        Normal  5.7-6.4 %     Increased risk " for diabetes  > 6.4 %        Diabetes       These guidelines have been recommended by the American Diabetic Association for Hgb A1c.      The following 2010 guidelines have been recommended by the American Diabetes Association for Hemoglobin A1c.    HBA1c 5.7-6.4% Increased risk for future diabetes (pre-diabetes)  HBA1c     >6.4% Diabetes      POC Glucose Once [[id number]]  (Abnormal) Collected: 12/15/21 0707    Specimen: Blood Updated: 12/15/21 0708     Glucose 293 mg/dL      Comment: Serial Number: [id number]:  [id number]       Basic Metabolic Panel [[id number]]  (Abnormal) Collected: 12/15/21 0636    Specimen: Blood Updated: 12/15/21 0706     Glucose 324 mg/dL      BUN 29 mg/dL      Creatinine 1.75 mg/dL      Sodium 136 mmol/L      Potassium 3.7 mmol/L      Chloride 97 mmol/L      CO2 24.0 mmol/L      Calcium 9.1 mg/dL      eGFR Non African Amer 28 mL/min/1.73      BUN/Creatinine Ratio 16.6     Anion Gap 15.0 mmol/L     Narrative:      GFR Normal >60  Chronic Kidney Disease <60  Kidney Failure <15      Uric Acid [[id number]]  (Abnormal) Collected: 12/15/21 0636    Specimen: Blood Updated: 12/15/21 0706     Uric Acid 6.3 mg/dL     Magnesium [[id number]]  (Normal) Collected: 12/15/21 0636    Specimen: Blood Updated: 12/15/21 0706     Magnesium 1.8 mg/dL     CBC & Differential [[id number]]  (Abnormal) Collected: 12/15/21 0636    Specimen: Blood Updated: 12/15/21 0650    Narrative:      The following orders were created for panel order CBC & Differential.  Procedure                               Abnormality         Status                     ---------                               -----------         ------                     CBC Auto Differential[[id number]]        Abnormal            Final result                 Please view results for these tests on the individual orders.    CBC Auto Differential [[id number]]  (Abnormal) Collected: 12/15/21 0636    Specimen: Blood Updated: 12/15/21 0650     WBC 8.10 10*3/mm3       RBC 3.70 10*6/mm3      Hemoglobin 12.1 g/dL      Hematocrit 34.9 %      MCV 94.3 fL      MCH 32.7 pg      MCHC 34.7 g/dL      RDW 15.1 %      RDW-SD 50.3 fl      MPV 8.3 fL      Platelets 227 10*3/mm3      Neutrophil % 90.5 %      Lymphocyte % 8.2 %      Monocyte % 1.1 %      Eosinophil % 0.0 %      Basophil % 0.2 %      Neutrophils, Absolute 7.30 10*3/mm3      Lymphocytes, Absolute 0.70 10*3/mm3      Monocytes, Absolute 0.10 10*3/mm3      Eosinophils, Absolute 0.00 10*3/mm3      Basophils, Absolute 0.00 10*3/mm3      nRBC 0.1 /100 WBC     Troponin [[id number]]  (Abnormal) Collected: 12/14/21 2140    Specimen: Blood Updated: 12/14/21 2231     Troponin T 0.075 ng/mL     Narrative:      Troponin T Reference Range:  <= 0.03 ng/mL-   Negative for AMI  >0.03 ng/mL-     Abnormal for myocardial necrosis.  Clinicians would have to utilize clinical acumen, EKG, Troponin and serial changes to determine if it is an Acute Myocardial Infarction or myocardial injury due to an underlying chronic condition.       Results may be falsely decreased if patient taking Biotin.      Magnesium [[id number]]  (Normal) Collected: 12/14/21 2140    Specimen: Blood Updated: 12/14/21 2215     Magnesium 1.7 mg/dL     D-dimer, Quantitative [[id number]]  (Abnormal) Collected: 12/14/21 2140    Specimen: Blood Updated: 12/14/21 2214     D-Dimer, Quantitative 1.09 mg/L (FEU)     Narrative:      Reference Range  --------------------------------------------------------------------     < 0.50   Negative Predictive Value  0.50-0.59   Indeterminate    >= 0.60   Probable VTE             A very low percentage of patients with DVT may yield D-Dimer results   below the cut-off of 0.50 mg/L FEU.  This is known to be more   prevalent in patients with distal DVT.             Results of this test should always be interpreted in conjunction with   the patient's medical history, clinical presentation and other   findings.  Clinical diagnosis should not be based on the  "result of   INNOVANCE D-Dimer alone.    Procalcitonin [[id number]]  (Abnormal) Collected: 12/14/21 1757    Specimen: Blood Updated: 12/14/21 2157     Procalcitonin 2.21 ng/mL     Narrative:      As a Marker for Sepsis (Non-Neonates):     1. <0.5 ng/mL represents a low risk of severe sepsis and/or septic shock.  2. >2 ng/mL represents a high risk of severe sepsis and/or septic shock.    As a Marker for Lower Respiratory Tract Infections that require antibiotic therapy:  PCT on Admission     Antibiotic Therapy             6-12 Hrs later  >0.5                          Strongly Recommended            >0.25 - <0.5             Recommended  0.1 - 0.25                  Discouraged                       Remeasure/reassess PCT  <0.1                         Strongly Discouraged         Remeasure/reassess PCT      As 28 day mortality risk marker: \"Change in Procalcitonin Result\" (>80% or <=80%) if Day 0 (or Day 1) and Day 4 values are available. Refer to http://www.[employer]-pct-calculator.com/    Change in PCT <=80 %   A decrease of PCT levels below or equal to 80% defines a positive change in PCT test result representing a higher risk for 28-day all-cause mortality of patients diagnosed with severe sepsis or septic shock.    Change in PCT >80 %   A decrease of PCT levels of more than 80% defines a negative change in PCT result representing a lower risk for 28-day all-cause mortality of patients diagnosed with severe sepsis or septic shock.                Blood Gas, Arterial - [[id number]]  (Abnormal) Collected: 12/14/21 2153    Specimen: Arterial Blood Updated: 12/14/21 2155     Site Left Radial     Dillon's Test Positive     pH, Arterial 7.456 pH units      pCO2, Arterial 36.3 mm Hg      pO2, Arterial 97.9 mm Hg      HCO3, Arterial 25.6 mmol/L      Base Excess, Arterial 1.8 mmol/L      Comment: Serial Number: [id number]:  [id number]        O2 Saturation, Arterial 98.0 %      CO2 Content 26.7 mmol/L      Barometric Pressure for " Blood Gas --     Comment: N/A        Modality Cannula     FIO2 28 %      Hemodilution No    C-reactive Protein [[id number]]  (Abnormal) Collected: 12/14/21 1757    Specimen: Blood Updated: 12/14/21 2151     C-Reactive Protein 16.47 mg/dL     TSH [[id number]]  (Normal) Collected: 12/14/21 1757    Specimen: Blood Updated: 12/14/21 2103     TSH 2.380 uIU/mL     Respiratory Panel PCR w/COVID-19(SARS-CoV-2) [facility] In-House, NP Swab in UTM/VTM, 3-4 HR TAT - Swab, Nasopharynx [[id number]]  (Normal) Collected: 12/14/21 1756    Specimen: Swab from Nasopharynx Updated: 12/14/21 1849     ADENOVIRUS, PCR Not Detected     Coronavirus 229E Not Detected     Coronavirus HKU1 Not Detected     Coronavirus NL63 Not Detected     Coronavirus OC43 Not Detected     COVID19 Not Detected     Human Metapneumovirus Not Detected     Human Rhinovirus/Enterovirus Not Detected     Influenza A PCR Not Detected     Influenza B PCR Not Detected     Parainfluenza Virus 1 Not Detected     Parainfluenza Virus 2 Not Detected     Parainfluenza Virus 3 Not Detected     Parainfluenza Virus 4 Not Detected     RSV, PCR Not Detected     Bordetella pertussis pcr Not Detected     Bordetella parapertussis PCR Not Detected     Chlamydophila pneumoniae PCR Not Detected     Mycoplasma pneumo by PCR Not Detected    Narrative:      In the setting of a positive respiratory panel with a viral infection PLUS a negative procalcitonin without other underlying concern for bacterial infection, consider observing off antibiotics or discontinuation of antibiotics and continue supportive care. If the respiratory panel is positive for atypical bacterial infection (Bordetella pertussis, Chlamydophila pneumoniae, or Mycoplasma pneumoniae), consider antibiotic de-escalation to target atypical bacterial infection.    Troponin [[id number]]  (Abnormal) Collected: 12/14/21 1757    Specimen: Blood Updated: 12/14/21 1824     Troponin T 0.097 ng/mL     Narrative:       Troponin T Reference Range:  <= 0.03 ng/mL-   Negative for AMI  >0.03 ng/mL-     Abnormal for myocardial necrosis.  Clinicians would have to utilize clinical acumen, EKG, Troponin and serial changes to determine if it is an Acute Myocardial Infarction or myocardial injury due to an underlying chronic condition.       Results may be falsely decreased if patient taking Biotin.      Comprehensive Metabolic Panel [[id number]]  (Abnormal) Collected: 12/14/21 1757    Specimen: Blood Updated: 12/14/21 1822     Glucose 173 mg/dL      BUN 22 mg/dL      Creatinine 1.69 mg/dL      Sodium 138 mmol/L      Potassium 3.5 mmol/L      Chloride 99 mmol/L      CO2 27.0 mmol/L      Calcium 8.9 mg/dL      Total Protein 7.0 g/dL      Albumin 3.50 g/dL      ALT (SGPT) 14 U/L      AST (SGOT) 29 U/L      Alkaline Phosphatase 101 U/L      Total Bilirubin 0.6 mg/dL      eGFR Non African Amer 29 mL/min/1.73      Globulin 3.5 gm/dL      A/G Ratio 1.0 g/dL      BUN/Creatinine Ratio 13.0     Anion Gap 12.0 mmol/L     Narrative:      GFR Normal >60  Chronic Kidney Disease <60  Kidney Failure <15      BNP [[id number]]  (Abnormal) Collected: 12/14/21 1757    Specimen: Blood Updated: 12/14/21 1817     proBNP 7,496.0 pg/mL     Narrative:      Among patients with dyspnea, NT-proBNP is highly sensitive for the detection of acute congestive heart failure. In addition NT-proBNP of <300 pg/ml effectively rules out acute congestive heart failure with 99% negative predictive value.    Results may be falsely decreased if patient taking Biotin.      CBC & Differential [[id number]]  (Abnormal) Collected: 12/14/21 1757    Specimen: Blood Updated: 12/14/21 1801    Narrative:      The following orders were created for panel order CBC & Differential.  Procedure                               Abnormality         Status                     ---------                               -----------         ------                     CBC Auto Differential[[id number]]         Abnormal            Final result                 Please view results for these tests on the individual orders.    CBC Auto Differential [[id number]]  (Abnormal) Collected: 12/14/21 1757    Specimen: Blood Updated: 12/14/21 1801     WBC 11.60 10*3/mm3      RBC 3.66 10*6/mm3      Hemoglobin 11.9 g/dL      Hematocrit 34.5 %      MCV 94.3 fL      MCH 32.4 pg      MCHC 34.3 g/dL      RDW 15.2 %      RDW-SD 49.9 fl      MPV 7.9 fL      Platelets 233 10*3/mm3      Neutrophil % 81.0 %      Lymphocyte % 7.2 %      Monocyte % 11.2 %      Eosinophil % 0.1 %      Basophil % 0.5 %      Neutrophils, Absolute 9.40 10*3/mm3      Lymphocytes, Absolute 0.80 10*3/mm3      Monocytes, Absolute 1.30 10*3/mm3      Eosinophils, Absolute 0.00 10*3/mm3      Basophils, Absolute 0.10 10*3/mm3      nRBC 0.0 /100 WBC           Imaging Results (Last 24 Hours)     Procedure Component Value Units Date/Time    XR Chest 1 View [[id number]] Collected: 12/14/21 1750     Updated: 12/14/21 1754    Narrative:      DATE OF EXAM:  12/14/2021 5:36 PM     PROCEDURE:  XR CHEST 1 VW-     INDICATIONS:  soa  shortness of breath. Hypoxia.     COMPARISON:  Chest radiograph 09/27/2011.     TECHNIQUE:   Single radiographic view of the chest was obtained.     FINDINGS:  Lungs normal expanded. Small pleural effusions. Cardiomegaly. Large size  hiatal hernia. Pulmonary vessels are ill-defined. Mild bibasilar opacity  atelectasis favored over pneumonia. No pneumothorax.       Impression:      Cardiomegaly with small pleural effusions and ill-defined pulmonary  vessels, question pulmonary edema.  Bibasilar airspace opacity right worse than left question subsegmental  atelectasis. Pneumonia less likely.  Large hiatal hernia.     Electronically Signed By-Mini Bower MD On:12/14/2021 5:52 PM  This report was finalized on [id number] by  Mini Bower MD.          EKG      I personally viewed and interpreted the patient's EKG/Telemetry data:    ECHOCARDIOGRAM:      STRESS  MYOVIEW:    CARDIAC CATHETERIZATION:    OTHER:         Assessment/Plan     Principal Problem:    Chronic obstructive pulmonary disease with acute exacerbation ([facility])  Active Problems:    Type 2 diabetes mellitus without complication, with long-term current use of insulin ([facility])    Hyperlipidemia    GERD (gastroesophageal reflux disease)    Hypertension    Dementia ([facility])    Atherosclerotic heart disease of native coronary artery without angina pectoris    Gout    Chronic respiratory failure with hypoxia ([facility])    Obesity (BMI 30-39.9)    Parkinson's disease ([facility])    Cardiomegaly    CKD (chronic kidney disease) stage 4, GFR 15-29 ml/min ([facility])  Shortness of breath  Elevated troponin    Patient presented with shortness of breath and is ruled out for Covid  Patient has severe COPD with exacerbation  Patient also had elevated BNP  Patient will have an echocardiogram for LV function valvular abnormalities  Patient had borderline impaired troponin but she has acute renal failure which could be the reason for her troponin levels  Blood pressure and heart rate stable  Patient's lipid levels are sugar levels are followed by the primary care doctor  Patient will be seen by nephrologist also.  Further treatment based on echocardiogram finding    I discussed the patients findings and my recommendations with patient and nurse    Virgilio Recinos MD  12/15/21  17:10 EST

## 2021-12-15 NOTE — CASE MANAGEMENT/SOCIAL WORK
Discharge Planning Assessment   Malcolm     Patient Name: Nory Duff  MRN: [id number]  Today's Date: 12/15/2021    Admit Date: 12/14/2021     Discharge Needs Assessment    No documentation.                Discharge Plan     Row Name 12/15/21 1359       Plan    Plan Return to [facility] [place]. No PASSR or Precert required    Patient/Family in Agreement with Plan yes    Plan Comments Spoke with patient's POA Evelyn and confirmed her plan for patient to return to [facility] [place]. She states she will not be  able to provide transportation for patient.  informed. Confirmed with facility liason Mary Kate that patient is ok to return when medically ready.              Continued Care and Services - Admitted Since 12/14/2021     Destination     Service Provider Request Status Selected Services Address Phone Fax Patient Preferred    [facility]  [facility] - Request Sent N/A [address] [address] [place] [address] [phone] --                  Amy Owusu RN, [facility]  Office: [phone]  Fax: [phone]  [email or web address]      I met with patient in room wearing PPE: mask and goggles.     Maintained distance greater than six feet and spent </=15 minutes in the room        Amy Owusu RN

## 2021-12-15 NOTE — PLAN OF CARE
Problem: Adult Inpatient Plan of Care  Goal: Plan of Care Review  Outcome: Ongoing, Progressing  Flowsheets (Taken 12/15/2021 1611)  Progress: improving  Plan of Care Reviewed With: patient  Outcome Summary: Pt. denies any SOA this far into shift. Pt. has rested comfortably in bed most of day. Pt. continues on 2L of oxygen with all VSS at this time. No new c/o voiced and no s/s pain or distress noted upon assessment. Will continue to monitor.  Goal: Patient-Specific Goal (Individualized)  Outcome: Ongoing, Progressing  Goal: Absence of Hospital-Acquired Illness or Injury  Outcome: Ongoing, Progressing  Intervention: Identify and Manage Fall Risk  Recent Flowsheet Documentation  Taken 12/15/2021 1600 by Naye Olmedo RN  Safety Promotion/Fall Prevention:   assistive device/personal items within reach   clutter free environment maintained   safety round/check completed   nonskid shoes/slippers when out of bed   fall prevention program maintained  Taken 12/15/2021 1250 by Naye Olmedo RN  Safety Promotion/Fall Prevention:   assistive device/personal items within reach   clutter free environment maintained   safety round/check completed   nonskid shoes/slippers when out of bed   fall prevention program maintained  Taken 12/15/2021 0830 by Naye Olmedo RN  Safety Promotion/Fall Prevention:   assistive device/personal items within reach   clutter free environment maintained   safety round/check completed   nonskid shoes/slippers when out of bed   fall prevention program maintained  Intervention: Prevent Skin Injury  Recent Flowsheet Documentation  Taken 12/15/2021 1600 by Naye Olmedo RN  Body Position:   position maintained   supine  Taken 12/15/2021 1250 by Naye Olmedo RN  Body Position: tilted, left  Taken 12/15/2021 0830 by Naye Olmedo RN  Body Position:   position maintained   supine  Goal: Optimal Comfort and Wellbeing  Outcome: Ongoing, Progressing  Goal: Readiness for  Transition of Care  Outcome: Ongoing, Progressing  Intervention: Mutually Develop Transition Plan  Recent Flowsheet Documentation  Taken 12/15/2021 0802 by Naye Olmedo RN  Transportation Anticipated: health plan transportation  Transportation Concerns: car, none  Patient/Family Anticipated Services at Transition:   Patient/Family Anticipates Transition to: inpatient rehabilitation facility  Taken 12/15/2021 0800 by Naye Olmedo RN  Equipment Currently Used at Home: wheelchair     Problem: Breathing Pattern Ineffective  Goal: Effective Breathing Pattern  Outcome: Ongoing, Progressing  Intervention: Promote Improved Breathing Pattern  Recent Flowsheet Documentation  Taken 12/15/2021 0830 by Naye Olmedo RN  Head of Bed (HOB): HOB elevated     Problem: Fluid Volume Excess  Goal: Fluid Balance  Outcome: Ongoing, Progressing   Goal Outcome Evaluation:  Plan of Care Reviewed With: patient        Progress: improving  Outcome Summary: Pt. denies any SOA this far into shift. Pt. has rested comfortably in bed most of day. Pt. continues on 2L of oxygen with all VSS at this time. No new c/o voiced and no s/s pain or distress noted upon assessment. Will continue to monitor.

## 2021-12-15 NOTE — DISCHARGE PLACEMENT REQUEST
"Nory Mckenzie (81 y.o. Female)             Date of Birth Social Security Number Address Home Phone MRN    1940  [id number] [place] [address] [phone] [id number]    Confucianism Marital Status             Advent        Admission Date Admission Type Admitting Provider Attending Provider Department, Room/Bed    12/14/21 Emergency Robert Puentes MD Ojo-Oniyun, Saheed G, MD [facility] EMERGENCY DEPARTMENT, EDH1/1    Discharge Date Discharge Disposition Discharge Destination                         Attending Provider: Robert Puentes MD    Allergies: Codeine, Latex, Namenda [Memantine]    Isolation: None   Infection: None   Code Status: CPR   Advance Care Planning Activity    Ht: 165.1 cm (65\")   Wt: 107 kg (235 lb)    Admission Cmt: None   Principal Problem: Chronic obstructive pulmonary disease with acute exacerbation (HCC) [J44.1]                 Active Insurance as of 12/14/2021     Primary Coverage     Payor Plan Insurance Group Employer/Plan Group    [facility] MEDICARE REPLACEMENT [facility] MEDICARE REPLACEMENT [id number]     Payor Plan Address Payor Plan Phone Number Payor Plan Fax Number Effective Dates    [address] [address]   1/1/2021 - None Entered    [facility] [address]       Subscriber Name Subscriber Birth Date Member ID       NORY MCKENZIE 1940 [id number]           Secondary Coverage     Payor Plan Insurance Group Employer/Plan Group    [place]      Payor Plan Address Payor Plan Phone Number Payor Plan Fax Number Effective Dates    [address]   1/1/2020 - None Entered    [place] [address]       Subscriber Name Subscriber Birth Date Member ID       NORY MCKENZIE 1940 [id number]                 Emergency Contacts      (Rel.) Home Phone Work Phone Mobile Phone    EVERT OLIVEIRA (Power of ) -- -- [phone]              "

## 2021-12-16 LAB
ANION GAP SERPL CALCULATED.3IONS-SCNC: 13 MMOL/L (ref 5–15)
BASOPHILS # BLD AUTO: 0 10*3/MM3 (ref 0–0.2)
BASOPHILS NFR BLD AUTO: 0.1 % (ref 0–1.5)
BUN SERPL-MCNC: 45 MG/DL (ref 8–23)
BUN/CREAT SERPL: 21.8 (ref 7–25)
CALCIUM SPEC-SCNC: 9.4 MG/DL (ref 8.6–10.5)
CHLORIDE SERPL-SCNC: 100 MMOL/L (ref 98–107)
CO2 SERPL-SCNC: 26 MMOL/L (ref 22–29)
CREAT SERPL-MCNC: 2.06 MG/DL (ref 0.57–1)
DEPRECATED RDW RBC AUTO: 49.9 FL (ref 37–54)
EOSINOPHIL # BLD AUTO: 0 10*3/MM3 (ref 0–0.4)
EOSINOPHIL NFR BLD AUTO: 0 % (ref 0.3–6.2)
ERYTHROCYTE [DISTWIDTH] IN BLOOD BY AUTOMATED COUNT: 15.1 % (ref 12.3–15.4)
GFR SERPL CREATININE-BSD FRML MDRD: 23 ML/MIN/1.73
GLUCOSE BLDC GLUCOMTR-MCNC: 194 MG/DL (ref 70–105)
GLUCOSE BLDC GLUCOMTR-MCNC: 226 MG/DL (ref 70–105)
GLUCOSE BLDC GLUCOMTR-MCNC: 308 MG/DL (ref 70–105)
GLUCOSE BLDC GLUCOMTR-MCNC: 320 MG/DL (ref 70–105)
GLUCOSE SERPL-MCNC: 348 MG/DL (ref 65–99)
HCT VFR BLD AUTO: 35.7 % (ref 34–46.6)
HGB BLD-MCNC: 11.8 G/DL (ref 12–15.9)
LYMPHOCYTES # BLD AUTO: 0.7 10*3/MM3 (ref 0.7–3.1)
LYMPHOCYTES NFR BLD AUTO: 4.6 % (ref 19.6–45.3)
MAGNESIUM SERPL-MCNC: 1.9 MG/DL (ref 1.6–2.4)
MCH RBC QN AUTO: 31.7 PG (ref 26.6–33)
MCHC RBC AUTO-ENTMCNC: 33.2 G/DL (ref 31.5–35.7)
MCV RBC AUTO: 95.6 FL (ref 79–97)
MONOCYTES # BLD AUTO: 0.4 10*3/MM3 (ref 0.1–0.9)
MONOCYTES NFR BLD AUTO: 2.6 % (ref 5–12)
NEUTROPHILS NFR BLD AUTO: 13.5 10*3/MM3 (ref 1.7–7)
NEUTROPHILS NFR BLD AUTO: 92.7 % (ref 42.7–76)
NRBC BLD AUTO-RTO: 0 /100 WBC (ref 0–0.2)
PLATELET # BLD AUTO: 282 10*3/MM3 (ref 140–450)
PMV BLD AUTO: 8.8 FL (ref 6–12)
POTASSIUM SERPL-SCNC: 3.8 MMOL/L (ref 3.5–5.2)
RBC # BLD AUTO: 3.73 10*6/MM3 (ref 3.77–5.28)
SODIUM SERPL-SCNC: 139 MMOL/L (ref 136–145)
WBC NRBC COR # BLD: 14.5 10*3/MM3 (ref 3.4–10.8)

## 2021-12-16 PROCEDURE — 36415 COLL VENOUS BLD VENIPUNCTURE: CPT | Performed by: INTERNAL MEDICINE

## 2021-12-16 PROCEDURE — 94799 UNLISTED PULMONARY SVC/PX: CPT

## 2021-12-16 PROCEDURE — 80048 BASIC METABOLIC PNL TOTAL CA: CPT | Performed by: INTERNAL MEDICINE

## 2021-12-16 PROCEDURE — 63710000001 INSULIN GLARGINE PER 5 UNITS: Performed by: INTERNAL MEDICINE

## 2021-12-16 PROCEDURE — 25010000002 FUROSEMIDE PER 20 MG: Performed by: NURSE PRACTITIONER

## 2021-12-16 PROCEDURE — 82962 GLUCOSE BLOOD TEST: CPT

## 2021-12-16 PROCEDURE — 25010000002 HEPARIN (PORCINE) PER 1000 UNITS: Performed by: NURSE PRACTITIONER

## 2021-12-16 PROCEDURE — 85025 COMPLETE CBC W/AUTO DIFF WBC: CPT | Performed by: NURSE PRACTITIONER

## 2021-12-16 PROCEDURE — 63710000001 INSULIN LISPRO (HUMAN) PER 5 UNITS: Performed by: NURSE PRACTITIONER

## 2021-12-16 PROCEDURE — 83735 ASSAY OF MAGNESIUM: CPT | Performed by: NURSE PRACTITIONER

## 2021-12-16 PROCEDURE — 25010000002 FUROSEMIDE PER 20 MG: Performed by: INTERNAL MEDICINE

## 2021-12-16 PROCEDURE — 99232 SBSQ HOSP IP/OBS MODERATE 35: CPT | Performed by: INTERNAL MEDICINE

## 2021-12-16 PROCEDURE — 63710000001 INSULIN LISPRO (HUMAN) PER 5 UNITS: Performed by: INTERNAL MEDICINE

## 2021-12-16 PROCEDURE — 25010000002 METHYLPREDNISOLONE PER 40 MG: Performed by: NURSE PRACTITIONER

## 2021-12-16 RX ORDER — INSULIN LISPRO 100 [IU]/ML
0-24 INJECTION, SOLUTION INTRAVENOUS; SUBCUTANEOUS
Status: DISCONTINUED | OUTPATIENT
Start: 2021-12-16 | End: 2021-12-17 | Stop reason: HOSPADM

## 2021-12-16 RX ORDER — INSULIN LISPRO 100 [IU]/ML
0-24 INJECTION, SOLUTION INTRAVENOUS; SUBCUTANEOUS AS NEEDED
Status: DISCONTINUED | OUTPATIENT
Start: 2021-12-16 | End: 2021-12-17 | Stop reason: HOSPADM

## 2021-12-16 RX ORDER — INSULIN GLARGINE 100 [IU]/ML
20 INJECTION, SOLUTION SUBCUTANEOUS DAILY
Status: DISCONTINUED | OUTPATIENT
Start: 2021-12-16 | End: 2021-12-17 | Stop reason: HOSPADM

## 2021-12-16 RX ORDER — FUROSEMIDE 10 MG/ML
20 INJECTION INTRAMUSCULAR; INTRAVENOUS
Status: DISCONTINUED | OUTPATIENT
Start: 2021-12-16 | End: 2021-12-17

## 2021-12-16 RX ADMIN — INSULIN LISPRO 10 UNITS: 100 INJECTION, SOLUTION INTRAVENOUS; SUBCUTANEOUS at 09:25

## 2021-12-16 RX ADMIN — FUROSEMIDE 40 MG: 10 INJECTION, SOLUTION INTRAMUSCULAR; INTRAVENOUS at 03:42

## 2021-12-16 RX ADMIN — HEPARIN SODIUM 5000 UNITS: 5000 INJECTION INTRAVENOUS; SUBCUTANEOUS at 09:25

## 2021-12-16 RX ADMIN — IPRATROPIUM BROMIDE AND ALBUTEROL SULFATE 3 ML: 2.5; .5 SOLUTION RESPIRATORY (INHALATION) at 16:38

## 2021-12-16 RX ADMIN — SODIUM CHLORIDE, PRESERVATIVE FREE 10 ML: 5 INJECTION INTRAVENOUS at 09:50

## 2021-12-16 RX ADMIN — IPRATROPIUM BROMIDE AND ALBUTEROL SULFATE 3 ML: 2.5; .5 SOLUTION RESPIRATORY (INHALATION) at 19:47

## 2021-12-16 RX ADMIN — METHYLPREDNISOLONE SODIUM SUCCINATE 40 MG: 40 INJECTION, POWDER, FOR SOLUTION INTRAMUSCULAR; INTRAVENOUS at 03:42

## 2021-12-16 RX ADMIN — INSULIN LISPRO 4 UNITS: 100 INJECTION, SOLUTION INTRAVENOUS; SUBCUTANEOUS at 16:48

## 2021-12-16 RX ADMIN — INSULIN LISPRO 16 UNITS: 100 INJECTION, SOLUTION INTRAVENOUS; SUBCUTANEOUS at 11:52

## 2021-12-16 RX ADMIN — CARBIDOPA AND LEVODOPA 1 TABLET: 25; 100 TABLET ORAL at 20:32

## 2021-12-16 RX ADMIN — INSULIN GLARGINE 20 UNITS: 100 INJECTION, SOLUTION SUBCUTANEOUS at 09:25

## 2021-12-16 RX ADMIN — PANTOPRAZOLE SODIUM 40 MG: 40 TABLET, DELAYED RELEASE ORAL at 09:49

## 2021-12-16 RX ADMIN — ASPIRIN 81 MG: 81 TABLET, COATED ORAL at 09:26

## 2021-12-16 RX ADMIN — NYSTATIN 1 APPLICATION: 100000 POWDER TOPICAL at 20:33

## 2021-12-16 RX ADMIN — SODIUM CHLORIDE, PRESERVATIVE FREE 10 ML: 5 INJECTION INTRAVENOUS at 20:33

## 2021-12-16 RX ADMIN — METHYLPREDNISOLONE SODIUM SUCCINATE 40 MG: 40 INJECTION, POWDER, FOR SOLUTION INTRAMUSCULAR; INTRAVENOUS at 11:53

## 2021-12-16 RX ADMIN — METHYLPREDNISOLONE SODIUM SUCCINATE 40 MG: 40 INJECTION, POWDER, FOR SOLUTION INTRAMUSCULAR; INTRAVENOUS at 20:32

## 2021-12-16 RX ADMIN — INSULIN LISPRO 8 UNITS: 100 INJECTION, SOLUTION INTRAVENOUS; SUBCUTANEOUS at 20:32

## 2021-12-16 RX ADMIN — NYSTATIN: 100000 POWDER TOPICAL at 16:49

## 2021-12-16 RX ADMIN — HEPARIN SODIUM 5000 UNITS: 5000 INJECTION INTRAVENOUS; SUBCUTANEOUS at 20:32

## 2021-12-16 RX ADMIN — CARBIDOPA AND LEVODOPA 1 TABLET: 25; 100 TABLET ORAL at 09:26

## 2021-12-16 RX ADMIN — CLOPIDOGREL BISULFATE 75 MG: 75 TABLET ORAL at 09:26

## 2021-12-16 RX ADMIN — FAMOTIDINE 20 MG: 20 TABLET ORAL at 20:32

## 2021-12-16 RX ADMIN — METOPROLOL TARTRATE 50 MG: 50 TABLET, FILM COATED ORAL at 09:26

## 2021-12-16 RX ADMIN — CARBIDOPA AND LEVODOPA 1 TABLET: 25; 100 TABLET ORAL at 16:49

## 2021-12-16 RX ADMIN — FUROSEMIDE 20 MG: 10 INJECTION, SOLUTION INTRAMUSCULAR; INTRAVENOUS at 16:48

## 2021-12-16 RX ADMIN — IPRATROPIUM BROMIDE AND ALBUTEROL SULFATE 3 ML: 2.5; .5 SOLUTION RESPIRATORY (INHALATION) at 09:09

## 2021-12-16 RX ADMIN — IPRATROPIUM BROMIDE AND ALBUTEROL SULFATE 3 ML: 2.5; .5 SOLUTION RESPIRATORY (INHALATION) at 11:59

## 2021-12-16 RX ADMIN — METOPROLOL TARTRATE 50 MG: 50 TABLET, FILM COATED ORAL at 20:32

## 2021-12-16 NOTE — PROGRESS NOTES
[facility] Medicine Services Daily Progress Note    Patient Name: Nory Duff  : 1940  MRN: [id number]  Primary Care Physician:  Giovani Mejia MD  Date of admission: 2021      Subjective      Chief Complaint:   Shortness of breath    Brief interim history:81-year-old pleasant female with background history of T2DM, hyperlipidemia, COPD, Parkinson disease, gouty arthritis dementia--> who presented from [facility] on 2021 with progressively worsening shortness of breath.  Of note, there Covid outbreak and the facility where patient is staying currently.  However, patient reportedly tested negative for Covid-19 at the [facility].  She is noted with diffuse expiratory wheezing and receive bronchodilator with DuoNeb and placed on supplemental oxygen in route to [facility] ED with some improvement.  Chest x-ray showed cardiomegaly with small pleural effusion and ill-defined pulmonary vessel correction for pulmonary edema with bibasilar airspace opacity worse on the right than left with questionable segmental atelectasis, pneumonia less likely, and a large hiatal hernia..  Laboratory is mostly unremarkable except for modestly elevated proBNP of 7496 and troponin of 0.097.  Patient is admitted with acute hypoxic respiratory failure, likely secondary to acute on chronic COPD exacerbation, and decompensated HF    2021: Feeling better with mild expiratory wheezing otherwise, no complaints or events overnight.    Objective    Seen and examined in follow evaluation.  Reports feeling better and resting comfortably in bed in no acute distress or discomfort.  Vitals:   Temp:  [97.5 °F (36.4 °C)-97.7 °F (36.5 °C)] 97.5 °F (36.4 °C)  Heart Rate:  [49-76] 76  Resp:  [11-20] 20  BP: (102-156)/(55-89) 156/89  Flow (L/min):  [2] 2    Physical Exam   Constitutional:       General: She is not in acute distress.     Appearance: Normal appearance. She is obese. She is not  ill-appearing, toxic-appearing or diaphoretic.   HENT:      Head: Normocephalic.      Right Ear: Tympanic membrane normal.      Nose: Nose normal.      Mouth/Throat:      Mouth: Mucous membranes are moist.   Eyes:      Pupils: Pupils are equal, round, and reactive to light.   Cardiovascular:      Rate and Rhythm: Normal rate.      Pulses: Normal pulses.   Pulmonary:      Effort: No respiratory distress.      Breath sounds: No stridor. Wheezing present. No rales.   Chest:      Chest wall: No tenderness.   Abdominal:      General: Abdomen is flat.   Musculoskeletal:         General: Normal range of motion.      Cervical back: Normal range of motion. No rigidity.   Skin:     General: Skin is warm.      Capillary Refill: Capillary refill takes less than 2 seconds.   Neurological:      Mental Status: She is alert. Mental status is at baseline.   Psychiatric:         Mood and Affect: Mood normal.               Result Review    Result Review:  I have personally reviewed the results from the time of this admission to 12/16/2021 12:25 EST and agree with these findings:  []  Laboratory  []  Microbiology  []  Radiology  []  EKG/Telemetry   []  Cardiology/Vascular   []  Pathology  []  Old records  []  Other:      Assessment/Plan      albuterol, 2.5 mg, Nebulization, Once  aspirin, 81 mg, Oral, Daily  carbidopa-levodopa, 1 tablet, Oral, TID  clopidogrel, 75 mg, Oral, Daily  famotidine, 20 mg, Oral, Nightly  furosemide, 20 mg, Intravenous, BID  heparin (porcine), 5,000 Units, Subcutaneous, Q12H  insulin glargine, 20 Units, Subcutaneous, Daily  insulin lispro, 0-24 Units, Subcutaneous, 4x Daily With Meals & Nightly  ipratropium-albuterol, 3 mL, Nebulization, 4x Daily - RT  methylPREDNISolone sodium succinate, 40 mg, Intravenous, Q8H  metoprolol tartrate, 50 mg, Oral, BID  nystatin, , Topical, TID  pantoprazole, 40 mg, Oral, Daily  sodium chloride, 10 mL, Intravenous, Q12H             Active Hospital Problems:  Active Hospital  Problems    Diagnosis    • **Chronic obstructive pulmonary disease with acute exacerbation (HCC)    • Chronic respiratory failure with hypoxia (HCC)    • Obesity (BMI 30-39.9)    • GERD (gastroesophageal reflux disease)    • Hypertension    • Dementia (HCC)    • Atherosclerotic heart disease of native coronary artery without angina pectoris    • Gout    • Parkinson's disease (HCC)    • Cardiomegaly    • CKD (chronic kidney disease) stage 4, GFR 15-29 ml/min ([facility])    • Type 2 diabetes mellitus without complication, with long-term current use of insulin ([facility])    • Hyperlipidemia        Acute hypoxic respiratory failure     -multifactorial and 2/2 acute on chronic COPD exacerbation and decompensated heart failure     Acute on chronic COPD exacerbation     -on supplemental oxygen via nasal cannula, bronchodilator, Solu-Medrol     Elevated proBNP/probable HFpEF      -cont gentle diuresis with Lasix,/metoprolol      -2D echo showed preserved LV function with estimated EF of 65%     T2DM      -SSI/Lantus     Hypertension      -on metoprolol/Lasix     History of Parkinson disease      -Sentiment     Alzheimer dementia     -continue redirection/reorientation and fall precautions     Obesity, BMI of 39     IZABELLA on CKD, stage IV      -worsening renal indices, decrease Lasix to 20 mg twice daily and continue to monitor renal indices closely      DVT prophylaxis:  Medical DVT prophylaxis orders are present.    CODE STATUS:    Code Status (Patient has no pulse and is not breathing): CPR (Attempt to Resuscitate)  Medical Interventions (Patient has pulse or is breathing): Full Support      Disposition:  I expect patient to be discharged 24 to 48 hours.    Electronically signed by Robert Puentes MD, 12/16/21, 12:25 EST.  [facility]ist Team

## 2021-12-16 NOTE — PLAN OF CARE
Goal Outcome Evaluation:  Plan of Care Reviewed With: patient      Patient is pleasantly confused; has not slept much tonight; no respiratory distress noted; remains on room air without oxygen requirements; still with periorbital edema; will continue to monitor patient.

## 2021-12-16 NOTE — PLAN OF CARE
Goal Outcome Evaluation:              Outcome Summary: Pt resting with no c/o pain or soa.  Pt on room air, V/S stable.   

## 2021-12-17 VITALS
SYSTOLIC BLOOD PRESSURE: 128 MMHG | HEART RATE: 95 BPM | WEIGHT: 190.26 LBS | DIASTOLIC BLOOD PRESSURE: 76 MMHG | TEMPERATURE: 97.6 F | RESPIRATION RATE: 16 BRPM | OXYGEN SATURATION: 98 % | HEIGHT: 65 IN | BODY MASS INDEX: 31.7 KG/M2

## 2021-12-17 LAB
ANION GAP SERPL CALCULATED.3IONS-SCNC: 14 MMOL/L (ref 5–15)
BASOPHILS # BLD AUTO: 0 10*3/MM3 (ref 0–0.2)
BASOPHILS NFR BLD AUTO: 0.1 % (ref 0–1.5)
BUN SERPL-MCNC: 58 MG/DL (ref 8–23)
BUN/CREAT SERPL: 24.1 (ref 7–25)
CALCIUM SPEC-SCNC: 9 MG/DL (ref 8.6–10.5)
CHLORIDE SERPL-SCNC: 101 MMOL/L (ref 98–107)
CO2 SERPL-SCNC: 24 MMOL/L (ref 22–29)
CREAT SERPL-MCNC: 2.41 MG/DL (ref 0.57–1)
DEPRECATED RDW RBC AUTO: 49.9 FL (ref 37–54)
EOSINOPHIL # BLD AUTO: 0 10*3/MM3 (ref 0–0.4)
EOSINOPHIL NFR BLD AUTO: 0.1 % (ref 0.3–6.2)
ERYTHROCYTE [DISTWIDTH] IN BLOOD BY AUTOMATED COUNT: 15 % (ref 12.3–15.4)
GFR SERPL CREATININE-BSD FRML MDRD: 19 ML/MIN/1.73
GLUCOSE BLDC GLUCOMTR-MCNC: 207 MG/DL (ref 70–105)
GLUCOSE BLDC GLUCOMTR-MCNC: 261 MG/DL (ref 70–105)
GLUCOSE BLDC GLUCOMTR-MCNC: 321 MG/DL (ref 70–105)
GLUCOSE SERPL-MCNC: 258 MG/DL (ref 65–99)
HCT VFR BLD AUTO: 35.6 % (ref 34–46.6)
HGB BLD-MCNC: 11.7 G/DL (ref 12–15.9)
LYMPHOCYTES # BLD AUTO: 0.7 10*3/MM3 (ref 0.7–3.1)
LYMPHOCYTES NFR BLD AUTO: 5.4 % (ref 19.6–45.3)
MAGNESIUM SERPL-MCNC: 1.9 MG/DL (ref 1.6–2.4)
MCH RBC QN AUTO: 31.4 PG (ref 26.6–33)
MCHC RBC AUTO-ENTMCNC: 32.8 G/DL (ref 31.5–35.7)
MCV RBC AUTO: 95.7 FL (ref 79–97)
MONOCYTES # BLD AUTO: 0.3 10*3/MM3 (ref 0.1–0.9)
MONOCYTES NFR BLD AUTO: 2 % (ref 5–12)
NEUTROPHILS NFR BLD AUTO: 12.7 10*3/MM3 (ref 1.7–7)
NEUTROPHILS NFR BLD AUTO: 92.4 % (ref 42.7–76)
NRBC BLD AUTO-RTO: 0.1 /100 WBC (ref 0–0.2)
PLATELET # BLD AUTO: 304 10*3/MM3 (ref 140–450)
PMV BLD AUTO: 8.8 FL (ref 6–12)
POTASSIUM SERPL-SCNC: 4.1 MMOL/L (ref 3.5–5.2)
RBC # BLD AUTO: 3.72 10*6/MM3 (ref 3.77–5.28)
SODIUM SERPL-SCNC: 139 MMOL/L (ref 136–145)
WBC NRBC COR # BLD: 13.7 10*3/MM3 (ref 3.4–10.8)

## 2021-12-17 PROCEDURE — 85025 COMPLETE CBC W/AUTO DIFF WBC: CPT | Performed by: NURSE PRACTITIONER

## 2021-12-17 PROCEDURE — 82962 GLUCOSE BLOOD TEST: CPT

## 2021-12-17 PROCEDURE — 63710000001 INSULIN LISPRO (HUMAN) PER 5 UNITS: Performed by: INTERNAL MEDICINE

## 2021-12-17 PROCEDURE — 63710000001 PREDNISONE PER 1 MG: Performed by: INTERNAL MEDICINE

## 2021-12-17 PROCEDURE — 83735 ASSAY OF MAGNESIUM: CPT | Performed by: NURSE PRACTITIONER

## 2021-12-17 PROCEDURE — 63710000001 INSULIN GLARGINE PER 5 UNITS: Performed by: INTERNAL MEDICINE

## 2021-12-17 PROCEDURE — 99222 1ST HOSP IP/OBS MODERATE 55: CPT | Performed by: NURSE PRACTITIONER

## 2021-12-17 PROCEDURE — 94799 UNLISTED PULMONARY SVC/PX: CPT

## 2021-12-17 PROCEDURE — 99239 HOSP IP/OBS DSCHRG MGMT >30: CPT | Performed by: INTERNAL MEDICINE

## 2021-12-17 PROCEDURE — 36415 COLL VENOUS BLD VENIPUNCTURE: CPT | Performed by: INTERNAL MEDICINE

## 2021-12-17 PROCEDURE — 80048 BASIC METABOLIC PNL TOTAL CA: CPT | Performed by: INTERNAL MEDICINE

## 2021-12-17 PROCEDURE — 99497 ADVNCD CARE PLAN 30 MIN: CPT | Performed by: NURSE PRACTITIONER

## 2021-12-17 PROCEDURE — 25010000002 HEPARIN (PORCINE) PER 1000 UNITS: Performed by: NURSE PRACTITIONER

## 2021-12-17 PROCEDURE — 99232 SBSQ HOSP IP/OBS MODERATE 35: CPT | Performed by: INTERNAL MEDICINE

## 2021-12-17 PROCEDURE — 25010000002 METHYLPREDNISOLONE PER 40 MG: Performed by: NURSE PRACTITIONER

## 2021-12-17 RX ORDER — IPRATROPIUM BROMIDE AND ALBUTEROL SULFATE 2.5; .5 MG/3ML; MG/3ML
3 SOLUTION RESPIRATORY (INHALATION)
Qty: 360 ML | Refills: 4 | Status: ON HOLD
Start: 2021-12-17 | End: 2022-05-06

## 2021-12-17 RX ORDER — FUROSEMIDE 20 MG/1
20 TABLET ORAL 2 TIMES DAILY
Qty: 30 TABLET | Refills: 0 | Status: SHIPPED | OUTPATIENT
Start: 2021-12-17 | End: 2023-01-16

## 2021-12-17 RX ORDER — INSULIN GLARGINE 100 [IU]/ML
20 INJECTION, SOLUTION SUBCUTANEOUS DAILY
Qty: 10 ML | Refills: 12
Start: 2021-12-17 | End: 2023-01-16

## 2021-12-17 RX ORDER — PREDNISONE 20 MG/1
40 TABLET ORAL
Qty: 30 TABLET | Refills: 0 | Status: ON HOLD | OUTPATIENT
Start: 2021-12-18 | End: 2022-05-06

## 2021-12-17 RX ORDER — NITROGLYCERIN 0.4 MG/1
0.4 TABLET SUBLINGUAL
Qty: 30 TABLET | Refills: 12
Start: 2021-12-17

## 2021-12-17 RX ORDER — PREDNISONE 20 MG/1
40 TABLET ORAL
Status: DISCONTINUED | OUTPATIENT
Start: 2021-12-17 | End: 2021-12-17 | Stop reason: HOSPADM

## 2021-12-17 RX ORDER — ALBUTEROL SULFATE 2.5 MG/3ML
2.5 SOLUTION RESPIRATORY (INHALATION) EVERY 4 HOURS PRN
Qty: 30 ML | Refills: 12
Start: 2021-12-17 | End: 2023-01-16

## 2021-12-17 RX ORDER — FUROSEMIDE 20 MG/1
20 TABLET ORAL
Status: DISCONTINUED | OUTPATIENT
Start: 2021-12-17 | End: 2021-12-17 | Stop reason: HOSPADM

## 2021-12-17 RX ORDER — BUDESONIDE AND FORMOTEROL FUMARATE DIHYDRATE 160; 4.5 UG/1; UG/1
2 AEROSOL RESPIRATORY (INHALATION)
Qty: 1 EACH | Refills: 12
Start: 2021-12-17

## 2021-12-17 RX ADMIN — PANTOPRAZOLE SODIUM 40 MG: 40 TABLET, DELAYED RELEASE ORAL at 09:02

## 2021-12-17 RX ADMIN — FUROSEMIDE 20 MG: 20 TABLET ORAL at 17:12

## 2021-12-17 RX ADMIN — CARBIDOPA AND LEVODOPA 1 TABLET: 25; 100 TABLET ORAL at 09:02

## 2021-12-17 RX ADMIN — INSULIN LISPRO 16 UNITS: 100 INJECTION, SOLUTION INTRAVENOUS; SUBCUTANEOUS at 13:59

## 2021-12-17 RX ADMIN — INSULIN GLARGINE 20 UNITS: 100 INJECTION, SOLUTION SUBCUTANEOUS at 08:58

## 2021-12-17 RX ADMIN — IPRATROPIUM BROMIDE AND ALBUTEROL SULFATE 3 ML: 2.5; .5 SOLUTION RESPIRATORY (INHALATION) at 08:30

## 2021-12-17 RX ADMIN — METHYLPREDNISOLONE SODIUM SUCCINATE 40 MG: 40 INJECTION, POWDER, FOR SOLUTION INTRAMUSCULAR; INTRAVENOUS at 03:40

## 2021-12-17 RX ADMIN — INSULIN LISPRO 12 UNITS: 100 INJECTION, SOLUTION INTRAVENOUS; SUBCUTANEOUS at 09:00

## 2021-12-17 RX ADMIN — ASPIRIN 81 MG: 81 TABLET, COATED ORAL at 09:02

## 2021-12-17 RX ADMIN — IPRATROPIUM BROMIDE AND ALBUTEROL SULFATE 3 ML: 2.5; .5 SOLUTION RESPIRATORY (INHALATION) at 12:09

## 2021-12-17 RX ADMIN — PREDNISONE 40 MG: 20 TABLET ORAL at 09:02

## 2021-12-17 RX ADMIN — FUROSEMIDE 20 MG: 20 TABLET ORAL at 09:02

## 2021-12-17 RX ADMIN — CLOPIDOGREL BISULFATE 75 MG: 75 TABLET ORAL at 09:02

## 2021-12-17 RX ADMIN — HEPARIN SODIUM 5000 UNITS: 5000 INJECTION INTRAVENOUS; SUBCUTANEOUS at 09:01

## 2021-12-17 RX ADMIN — CARBIDOPA AND LEVODOPA 1 TABLET: 25; 100 TABLET ORAL at 17:12

## 2021-12-17 RX ADMIN — METOPROLOL TARTRATE 50 MG: 50 TABLET, FILM COATED ORAL at 09:02

## 2021-12-17 NOTE — CONSULTS
"Nutrition Services    Patient Name: Nory Duff  YOB: 1940  MRN: [id number]  Admission date: 12/14/2021    PPE Documentation        PPE Worn By Provider mask and eye protection   PPE Worn By Patient  None      NUTRITION SCREENING      Encounter Information: 12/17: Received consult for diet education for weight loss counseling. Pt resides in an [facility] and has lost 20% body weight in the last year in the setting of ongoing chronic respiratory illness and dementia. Nutrition care is managed by RD at [facility]. Intentional weight reduction would not be indicated, given current Dz states and advanced age. Recommend weight stability as the goal to avoid emergence of chronic disease related malnutrition. Pt does not currently meet criteria for malnutrition despite wt loss, but certainly is at risk.        PO Diet: Diet Cardiac, Diabetic/Consistent Carbs; 2gm Na+; Diabetic - Consistent Carb   PO Supplements: None ordered   PO Intake:  Very good intake (%) at recent meals        Labs (reviewed below): Elevated BUN/Cr - C/W chronic renal Dz  Glucose elevated C/W Hx DM        GI Function:  None documented        Skin: No breakdown documented        Weight Hx Review: Estimated body mass index is 31.66 kg/m² as calculated from the following:    Height as of this encounter: 165.1 cm (65\").    Weight as of this encounter: 86.3 kg (190 lb 4.1 oz).         Nutrition Intervention: Will continue diet as tolerated, which is meeting needs.        Results from last 7 days   Lab Units 12/17/21  0503 12/16/21  0457 12/15/21  0636 12/14/21  1757 12/14/21  1757   SODIUM mmol/L 139 139 136   < > 138   POTASSIUM mmol/L 4.1 3.8 3.7   < > 3.5   CHLORIDE mmol/L 101 100 97*   < > 99   CO2 mmol/L 24.0 26.0 24.0   < > 27.0   BUN mg/dL 58* 45* 29*   < > 22   CREATININE mg/dL 2.41* 2.06* 1.75*   < > 1.69*   CALCIUM mg/dL 9.0 9.4 9.1   < > 8.9   BILIRUBIN mg/dL  --   --   --   --  0.6   ALK PHOS U/L  --   --   --   --  101   ALT " (SGPT) U/L  --   --   --   --  14   AST (SGOT) U/L  --   --   --   --  29   GLUCOSE mg/dL 258* 348* 324*   < > 173*    < > = values in this interval not displayed.     Results from last 7 days   Lab Units 12/17/21  0503 12/16/21  0457 12/16/21  0457 12/15/21  0636 12/15/21  0636   MAGNESIUM mg/dL 1.9  --  1.9  --  1.8   HEMOGLOBIN g/dL 11.7*   < > 11.8*   < > 12.1   HEMATOCRIT % 35.6   < > 35.7   < > 34.9    < > = values in this interval not displayed.     COVID19   Date Value Ref Range Status   12/14/2021 Not Detected Not Detected - Ref. Range Final     Lab Results   Component Value Date    HGBA1C 7.4 (H) 12/14/2021     RD to follow up per protocol.    Electronically signed by:  Milly Ballesteros RD  12/17/21 13:07 EST

## 2021-12-17 NOTE — PLAN OF CARE
Goal Outcome Evaluation:  Plan of Care Reviewed With: patient        Progress: improving  Outcome Summary: Patient rested well over night. Patient is pleasantly confused. Still diuresing patient. Cardio has signed off of patient. Patient will go back to [facility] at d/c.

## 2021-12-17 NOTE — DISCHARGE SUMMARY
[facility] Medicine Services  DISCHARGE SUMMARY    Patient Name: Nory Duff  : 1940  MRN: [id number]    Date of Admission: 2021  Date of Discharge:  21  Primary Care Physician: Giovani Mejia MD      Presenting Problem:   Elevated troponin [R77.8]  COPD exacerbation (HCC) [J44.1]  Dyspnea, unspecified type [R06.00]  Congestive heart failure, unspecified HF chronicity, unspecified heart failure type (HCC) [I50.9]    Active and Resolved Hospital Problems:  Active Hospital Problems    Diagnosis POA   • **Chronic obstructive pulmonary disease with acute exacerbation (HCC) [J44.1] Unknown   • Chronic respiratory failure with hypoxia (HCC) [J96.11] Yes   • Obesity (BMI 30-39.9) [E66.9] Yes   • GERD (gastroesophageal reflux disease) [K21.9] Unknown   • Hypertension [I10] Unknown   • Dementia (HCC) [F03.90] Unknown   • Atherosclerotic 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     Acute hypoxic respiratory failure     -multifactorial and 2/2 acute on chronic COPD exacerbation and decompensated heart failure     Acute on chronic COPD exacerbation     -on supplemental oxygen via nasal cannula, bronchodilator, Solu-Medrol     Elevated proBNP/probable HFpEF      -cont gentle diuresis with Lasix,/metoprolol      -2D echo showed preserved LV function with estimated EF of 65%     T2DM      -SSI/Lantus     Hypertension      -on metoprolol/Lasix     History of Parkinson disease      -Sentiment     Alzheimer  dementia     -continue redirection/reorientation and fall precautions     Obesity, BMI of 39     IZABELLA on CKD, stage IV      -worsening renal indices, decrease Lasix to 20 mg twice daily and continue to monitor renal indices closely       Hospital Course     Hospital Course:  Nory Duff is a 81 y.o. pleasant female with background history of T2DM, hyperlipidemia, COPD, Parkinson disease, gouty arthritis dementia--> who presented from [facility] on 12/14/2021 with progressively worsening shortness of breath.  Of note, there Covid outbreak and the facility where patient is staying currently.  However, patient reportedly tested negative for Covid-19 at the [facility].  She is noted with diffuse expiratory wheezing and receive bronchodilator with DuoNeb and placed on supplemental oxygen in route to [facility] ED with some improvement.  Chest x-ray showed cardiomegaly with small pleural effusion and ill-defined pulmonary vessel correction for pulmonary edema with bibasilar airspace opacity worse on the right than left with questionable segmental atelectasis, pneumonia less likely, and a large hiatal hernia..  Laboratory is mostly unremarkable except for modestly elevated proBNP of 7496 and troponin of 0.097.  Patient is admitted with acute hypoxic respiratory failure, likely secondary to acute on chronic COPD exacerbation, and decompensated HF     12/16/2021: Feeling better with mild expiratory wheezing otherwise, no complaints or events overnight.    12/17/21 patient seen and examined in bed no acute distress, she is pleasantly confused, will discharge patient back to nursing home, condition at discharge stable, discussed with RN.      DISCHARGE Follow Up Recommendations for labs and diagnostics: bmp/cbc      Reasons For Change In Medications and Indications for New Medications:  Symbicort/prednisone  -Increase Lantus to 20 units at bedtime and monitor blood glucose closely  -Decrease Lasix to once a day for 7  days and monitor BMP in a week    Day of Discharge     Vital Signs:  Temp:  [97.2 °F (36.2 °C)-97.6 °F (36.4 °C)] 97.6 °F (36.4 °C)  Heart Rate:  [59-98] 95  Resp:  [16-20] 16  BP: (128-142)/(70-79) 128/76  Flow (L/min):  [2-2.5] 2.5    Physical Exam  Vitals and nursing note reviewed.   Constitutional:       General: She is not in acute distress.     Appearance: She is well-developed. She is obese. She is not toxic-appearing.   HENT:      Head: Normocephalic and atraumatic.      Nose: Nose normal. No congestion or rhinorrhea.      Mouth/Throat:      Mouth: Mucous membranes are moist.      Pharynx: No oropharyngeal exudate.   Eyes:      General:         Right eye: No discharge.      Extraocular Movements: Extraocular movements intact.      Conjunctiva/sclera: Conjunctivae normal.      Pupils: Pupils are equal, round, and reactive to light.   Neck:      Thyroid: No thyromegaly.      Vascular: No JVD.      Trachea: No tracheal deviation.   Cardiovascular:      Rate and Rhythm: Normal rate and regular rhythm.      Pulses: Normal pulses.      Heart sounds: Normal heart sounds. No murmur heard.  No friction rub. No gallop.    Pulmonary:      Effort: Pulmonary effort is normal. No respiratory distress.      Breath sounds: Normal breath sounds.   Abdominal:      General: Bowel sounds are normal. There is no distension.      Palpations: Abdomen is soft.   Musculoskeletal:         General: No swelling or deformity. Normal range of motion.      Cervical back: Normal range of motion and neck supple. No rigidity. No muscular tenderness.      Right lower leg: No edema.   Skin:     General: Skin is warm and dry.      Coloration: Skin is pale. Skin is not jaundiced.      Findings: No bruising or erythema.   Neurological:      General: No focal deficit present.      Mental Status: She is alert. Mental status is at baseline.      Cranial Nerves: No cranial nerve deficit.      Motor: Weakness present. No abnormal muscle tone.    Psychiatric:         Mood and Affect: Mood normal.          Pertinent  and/or Most Recent Results     LAB RESULTS:      Lab 12/17/21  0503 12/16/21  0457 12/15/21  0636 12/14/21  1757   WBC 13.70* 14.50* 8.10 11.60*   HEMOGLOBIN 11.7* 11.8* 12.1 11.9*   HEMATOCRIT 35.6 35.7 34.9 34.5   PLATELETS 304 282 227 233   NEUTROS ABS 12.70* 13.50* 7.30* 9.40*   LYMPHS ABS 0.70 0.70 0.70 0.80   MONOS ABS 0.30 0.40 0.10 1.30*   EOS ABS 0.00 0.00 0.00 0.00   MCV 95.7 95.6 94.3 94.3   CRP  --   --   --  16.47*   PROCALCITONIN  --   --   --  2.21*         Lab 12/17/21  0503 12/16/21  0457 12/15/21  0636 12/14/21 2140 12/14/21  1757   SODIUM 139 139 136  --  138   POTASSIUM 4.1 3.8 3.7  --  3.5   CHLORIDE 101 100 97*  --  99   CO2 24.0 26.0 24.0  --  27.0   ANION GAP 14.0 13.0 15.0  --  12.0   BUN 58* 45* 29*  --  22   CREATININE 2.41* 2.06* 1.75*  --  1.69*   GLUCOSE 258* 348* 324*  --  173*   CALCIUM 9.0 9.4 9.1  --  8.9   MAGNESIUM 1.9 1.9 1.8 1.7  --    HEMOGLOBIN A1C  --   --   --  7.4*  --    TSH  --   --   --   --  2.380         Lab 12/14/21  1757   TOTAL PROTEIN 7.0   ALBUMIN 3.50   GLOBULIN 3.5   ALT (SGPT) 14   AST (SGOT) 29   BILIRUBIN 0.6   ALK PHOS 101         Lab 12/14/21 2140 12/14/21  1757   PROBNP  --  7,496.0*   TROPONIN T 0.075* 0.097*                 Lab 12/14/21 2153   PH, ARTERIAL 7.456*   PCO2, ARTERIAL 36.3   PO2 ART 97.9   O2 SATURATION ART 98.0   FIO2 28   HCO3 ART 25.6   BASE EXCESS ART 1.8     Brief Urine Lab Results  (Last result in the past 365 days)      Color   Clarity   Blood   Leuk Est   Nitrite   Protein   CREAT   Urine HCG        01/01/21 2338 Yellow   Turbid  Comment: Result checked    Small (1+)   Large (3+)   Negative   30 mg/dL (1+)               Microbiology Results (last 10 days)     Procedure Component Value - Date/Time    Respiratory Panel PCR w/COVID-19(SARS-CoV-2) [facility]GETTE/[facility]/AGUEDA/[facility]/GONZALO In-House, NP Swab in UTM/VTM, 3-4 HR TAT - Swab, Nasopharynx [[id number]]  (Normal)  Collected: 12/14/21 8591    Lab Status: Final result Specimen: Swab from Nasopharynx Updated: 12/14/21 1846     ADENOVIRUS, PCR Not Detected     Coronavirus 229E Not Detected     Coronavirus HKU1 Not Detected     Coronavirus NL63 Not Detected     Coronavirus OC43 Not Detected     COVID19 Not Detected     Human Metapneumovirus Not Detected     Human Rhinovirus/Enterovirus Not Detected     Influenza A PCR Not Detected     Influenza B PCR Not Detected     Parainfluenza Virus 1 Not Detected     Parainfluenza Virus 2 Not Detected     Parainfluenza Virus 3 Not Detected     Parainfluenza Virus 4 Not Detected     RSV, PCR Not Detected     Bordetella pertussis pcr Not Detected     Bordetella parapertussis PCR Not Detected     Chlamydophila pneumoniae PCR Not Detected     Mycoplasma pneumo by PCR Not Detected    Narrative:      In the setting of a positive respiratory panel with a viral infection PLUS a negative procalcitonin without other underlying concern for bacterial infection, consider observing off antibiotics or discontinuation of antibiotics and continue supportive care. If the respiratory panel is positive for atypical bacterial infection (Bordetella pertussis, Chlamydophila pneumoniae, or Mycoplasma pneumoniae), consider antibiotic de-escalation to target atypical bacterial infection.          XR Chest 1 View    Result Date: 12/14/2021  Impression: Cardiomegaly with small pleural effusions and ill-defined pulmonary vessels, question pulmonary edema. Bibasilar airspace opacity right worse than left question subsegmental atelectasis. Pneumonia less likely. Large hiatal hernia.  Electronically Signed By-Mini Bower MD On:12/14/2021 5:52 PM This report was finalized on [id number] by  Mini Bower MD.              Results for orders placed during the hospital encounter of 12/14/21    Adult Transthoracic Echo Complete w/ Color, Spectral and Contrast if necessary per protocol    Interpretation Summary  · Left  ventricular ejection fraction appears to be 61 - 65%.  · No pericardial effusion noted      Labs Pending at Discharge:      Procedures Performed           Consults:   Consults     Date and Time Order Name Status Description    12/14/2021  8:18 PM Inpatient Cardiology Consult Completed     12/14/2021  7:02 PM Hospitalist (on-call MD unless specified)              Discharge Details        Discharge Medications      New Medications      Instructions Start Date   albuterol (2.5 MG/3ML) 0.083% nebulizer solution  Commonly known as: PROVENTIL   2.5 mg, Nebulization, Every 4 Hours PRN      budesonide-formoterol 160-4.5 MCG/ACT inhaler  Commonly known as: Symbicort   2 puffs, Inhalation, 2 Times Daily - RT      ipratropium-albuterol 0.5-2.5 mg/3 ml nebulizer  Commonly known as: DUO-NEB   3 mL, Nebulization, 4 Times Daily - RT      nitroglycerin 0.4 MG SL tablet  Commonly known as: NITROSTAT   0.4 mg, Sublingual, Every 5 Minutes PRN, Take no more than 3 doses in 15 minutes.      predniSONE 20 MG tablet  Commonly known as: DELTASONE  Notes to patient: Take exactly as directed   40 mg, Oral, Daily With Breakfast, 40 mg p.o. daily x3 days, 30 mg p.o. daily x3 days, 20 mg p.o. daily x3 days, 10 mg p.o. daily x3 days,   Start Date: December 18, 2021        Changes to Medications      Instructions Start Date   furosemide 20 MG tablet  Commonly known as: LASIX  What changed: additional instructions   20 mg, Oral, 2 Times Daily, q day x 7 days, check bmp in one week. Then increase to BID      insulin glargine 100 UNIT/ML injection  Commonly known as: LANTUS, SEMGLEE  What changed: how much to take   20 Units, Subcutaneous, Daily         Continue These Medications      Instructions Start Date   acetaminophen 325 MG tablet  Commonly known as: TYLENOL   650 mg, Oral, Every 6 Hours PRN      allopurinol 100 MG tablet  Commonly known as: ZYLOPRIM   200 mg, Oral, Daily      aspirin 81 MG EC tablet   81 mg, Oral, Daily       carbidopa-levodopa  MG per tablet  Commonly known as: SINEMET   1 tablet, Oral, 3 Times Daily      clopidogrel 75 MG tablet  Commonly known as: PLAVIX   75 mg, Oral, Daily      fish oil 1000 MG capsule capsule   1,000 mg, Oral, Daily With Breakfast      Glucagon Emergency 1 MG kit   Injection, As Needed      magnesium hydroxide 400 MG/5ML suspension  Commonly known as: MILK OF MAGNESIA   30 mL, Oral, Daily PRN      metoprolol tartrate 50 MG tablet  Commonly known as: LOPRESSOR   50 mg, Oral, 2 Times Daily      nystatin 258596 UNIT/GM powder  Commonly known as: MYCOSTATIN   Topical, 3 Times Daily, Under breasts/ abd folds      pantoprazole 40 MG EC tablet  Commonly known as: PROTONIX   40 mg, Oral, Daily      selenium sulfide 1 % lotion  Commonly known as: SELSUN   Topical, Every 7 Days, Tuesday          Stop These Medications    famotidine 20 MG tablet  Commonly known as: PEPCID            Allergies   Allergen Reactions   • Codeine Anaphylaxis   • Latex Anaphylaxis   • Namenda [Memantine] Anaphylaxis     Discharge Disposition:   Skilled Nursing Facility (DC - External)    Diet:  Hospital:  Diet Order   Procedures   • Diet Cardiac, Diabetic/Consistent Carbs; 2gm Na+; Diabetic - Consistent Carb     Discharge Activity:   Activity Instructions     Gradually Increase Activity Until at Pre-Hospitalization Level          CODE STATUS:  Code Status and Medical Interventions:   Ordered at: 12/17/21 1227     Medical Intervention Limits:    NO intubation (DNI)     Level Of Support Discussed With:    Health Care Surrogate     Code Status (Patient has no pulse and is not breathing):    No CPR (Do Not Attempt to Resuscitate)     Medical Interventions (Patient has pulse or is breathing):    Limited Support     No future appointments.    Additional Instructions for the Follow-ups that You Need to Schedule     Discharge Follow-up with PCP   As directed       Currently Documented PCP:    Giovani Mejia MD    PCP Phone  Number:    [phone]     Follow Up Details: 1 WEEK             Time spent on Discharge including face to face service:  34 minutes    This patient has been examined wearing appropriate Personal Protective Equipment and discussed with rn. 12/17/21      Signature: Electronically signed by Cordell Delaney MD, 12/17/21, 1:21 PM EST.

## 2021-12-17 NOTE — PROGRESS NOTES
Referring Provider: Hospitalist    Reason for follow-up: Shortness of breath     Patient Care Team:  Giovani Mejia MD as PCP - General (Geriatric Medicine)    Subjective .  Feeling better now    Objective  Lying in bed comfortably     Review of Systems   Constitutional: Negative for fever and malaise/fatigue.   Cardiovascular: Negative for chest pain, dyspnea on exertion and palpitations.   Respiratory: Negative for cough and shortness of breath.    Skin: Negative for rash.   Gastrointestinal: Negative for abdominal pain, nausea and vomiting.   Neurological: Negative for focal weakness and headaches.   All other systems reviewed and are negative.      Codeine, Latex, and Namenda [memantine]    Scheduled Meds:albuterol, 2.5 mg, Nebulization, Once  aspirin, 81 mg, Oral, Daily  carbidopa-levodopa, 1 tablet, Oral, TID  clopidogrel, 75 mg, Oral, Daily  famotidine, 20 mg, Oral, Nightly  furosemide, 20 mg, Intravenous, BID  heparin (porcine), 5,000 Units, Subcutaneous, Q12H  insulin glargine, 20 Units, Subcutaneous, Daily  insulin lispro, 0-24 Units, Subcutaneous, 4x Daily With Meals & Nightly  ipratropium-albuterol, 3 mL, Nebulization, 4x Daily - RT  methylPREDNISolone sodium succinate, 40 mg, Intravenous, Q8H  metoprolol tartrate, 50 mg, Oral, BID  nystatin, , Topical, TID  pantoprazole, 40 mg, Oral, Daily  sodium chloride, 10 mL, Intravenous, Q12H      Continuous Infusions:   PRN Meds:.•  acetaminophen **OR** acetaminophen **OR** acetaminophen  •  dextrose  •  dextrose  •  glucagon (human recombinant)  •  insulin lispro **AND** insulin lispro  •  ipratropium-albuterol  •  magnesium hydroxide  •  magnesium sulfate **OR** magnesium sulfate in D5W 1g/100mL (PREMIX)  •  nitroglycerin  •  ondansetron **OR** ondansetron  •  potassium chloride  •  potassium chloride  •  [COMPLETED] Insert peripheral IV **AND** sodium chloride  •  sodium chloride        VITAL SIGNS  Vitals:    12/16/21 1205 12/16/21 1300 12/16/21 1638  "12/16/21 1645   BP:  137/83     BP Location:  Left arm     Patient Position:  Lying     Pulse: 76 61 59 60   Resp: 20 18 18 18   Temp:  97.6 °F (36.4 °C)     TempSrc:  Oral     SpO2: 96% 98% 96% 100%   Weight:       Height:           Flowsheet Rows      First Filed Value   Admission Height 165.1 cm (65\") Documented at 12/14/2021 1546   Admission Weight 107 kg (235 lb) Documented at 12/14/2021 1546           TELEMETRY: Sinus rhythm    Physical Exam:  Constitutional:       Appearance: Well-developed.   Eyes:      General: No scleral icterus.     Conjunctiva/sclera: Conjunctivae normal.   HENT:      Head: Normocephalic and atraumatic.   Neck:      Vascular: No carotid bruit or JVD.   Pulmonary:      Effort: Pulmonary effort is normal.      Breath sounds: Normal breath sounds. No wheezing. No rales.   Cardiovascular:      Normal rate. Regular rhythm.      Murmurs: There is a systolic murmur.   Pulses:     Intact distal pulses.   Abdominal:      General: Bowel sounds are normal.      Palpations: Abdomen is soft.   Musculoskeletal:      Cervical back: Normal range of motion and neck supple. Skin:     General: Skin is warm and dry.      Findings: No rash.   Neurological:      Mental Status: Alert.          Results Review:   I reviewed the patient's new clinical results.  Lab Results (last 24 hours)     Procedure Component Value Units Date/Time    POC Glucose Once [[id number]]  (Abnormal) Collected: 12/16/21 1621    Specimen: Blood Updated: 12/16/21 1622     Glucose 194 mg/dL      Comment: Serial Number: [id number]:  [id number]       POC Glucose Once [[id number]]  (Abnormal) Collected: 12/16/21 1138    Specimen: Blood Updated: 12/16/21 1140     Glucose 308 mg/dL      Comment: Serial Number: [id number]:  [id number]       POC Glucose Once [[id number]]  (Abnormal) Collected: 12/16/21 0723    Specimen: Blood Updated: 12/16/21 0725     Glucose 320 mg/dL      Comment: Serial Number: [id number]:  [id number]       " Basic Metabolic Panel [[id number]]  (Abnormal) Collected: 12/16/21 0457    Specimen: Blood Updated: 12/16/21 0559     Glucose 348 mg/dL      BUN 45 mg/dL      Creatinine 2.06 mg/dL      Sodium 139 mmol/L      Potassium 3.8 mmol/L      Chloride 100 mmol/L      CO2 26.0 mmol/L      Calcium 9.4 mg/dL      eGFR Non African Amer 23 mL/min/1.73      BUN/Creatinine Ratio 21.8     Anion Gap 13.0 mmol/L     Narrative:      GFR Normal >60  Chronic Kidney Disease <60  Kidney Failure <15      Magnesium [[id number]]  (Normal) Collected: 12/16/21 0457    Specimen: Blood Updated: 12/16/21 0548     Magnesium 1.9 mg/dL     CBC & Differential [[id number]]  (Abnormal) Collected: 12/16/21 0457    Specimen: Blood Updated: 12/16/21 0544    Narrative:      The following orders were created for panel order CBC & Differential.  Procedure                               Abnormality         Status                     ---------                               -----------         ------                     CBC Auto Differential[[id number]]        Abnormal            Final result                 Please view results for these tests on the individual orders.    CBC Auto Differential [[id number]]  (Abnormal) Collected: 12/16/21 0457    Specimen: Blood Updated: 12/16/21 0544     WBC 14.50 10*3/mm3      RBC 3.73 10*6/mm3      Hemoglobin 11.8 g/dL      Hematocrit 35.7 %      MCV 95.6 fL      MCH 31.7 pg      MCHC 33.2 g/dL      RDW 15.1 %      RDW-SD 49.9 fl      MPV 8.8 fL      Platelets 282 10*3/mm3      Neutrophil % 92.7 %      Lymphocyte % 4.6 %      Monocyte % 2.6 %      Eosinophil % 0.0 %      Basophil % 0.1 %      Neutrophils, Absolute 13.50 10*3/mm3      Lymphocytes, Absolute 0.70 10*3/mm3      Monocytes, Absolute 0.40 10*3/mm3      Eosinophils, Absolute 0.00 10*3/mm3      Basophils, Absolute 0.00 10*3/mm3      nRBC 0.0 /100 WBC     POC Glucose Once [[id number]]  (Abnormal) Collected: 12/15/21 1941    Specimen: Blood Updated: 12/15/21 1942     Glucose  394 mg/dL      Comment: Serial Number: [id number]:  [id number]             Imaging Results (Last 24 Hours)     ** No results found for the last 24 hours. **          EKG      I personally viewed and interpreted the patient's EKG/Telemetry data:    ECHOCARDIOGRAM:    STRESS MYOVIEW:    CARDIAC CATHETERIZATION:    OTHER:         Assessment/Plan     Principal Problem:    Chronic obstructive pulmonary disease with acute exacerbation ([facility])  Active Problems:    Type 2 diabetes mellitus without complication, with long-term current use of insulin ([facility])    Hyperlipidemia    GERD (gastroesophageal reflux disease)    Hypertension    Dementia ([facility])    Atherosclerotic heart disease of native coronary artery without angina pectoris    Gout    Chronic respiratory failure with hypoxia ([facility])    Obesity (BMI 30-39.9)    Parkinson's disease ([facility])    Cardiomegaly    CKD (chronic kidney disease) stage 4, GFR 15-29 ml/min ([facility])       Patient presented with shortness of breath and is ruled out for MI by EKG enzymes  Patient had echocardiogram which showed normal V function with mild valvular heart disease  Blood pressure and heart rate stable  Patient sugar levels and lipid levels are followed by the primary care doctor  Patient has COPD with possible sleep apnea and pulmonary hypertension and will have work-up done for the same  We will not do any further cardiac work-up.    I discussed the patients findings and my recommendations with patient and nurse    Virgilio Recinos MD  12/16/21  19:38 EST

## 2021-12-17 NOTE — CONSULTS
Palliative Care Consultation    Patient Name: Nory Duff  : 1940  MRN: [id number]  Allergies: Codeine, Latex, and Namenda [memantine]    Requesting clinician:  Rhett  Reason for consult: Consultation for clarification of goals of care and code status.      Patient Code Status:   Code Status and Medical Interventions:   Ordered at: 12/15/21 0101     Code Status (Patient has no pulse and is not breathing):    CPR (Attempt to Resuscitate)     Medical Interventions (Patient has pulse or is breathing):    Full Support           Chief Complaint:    Shortness of breath    History of Present Illness    Nory Duff is a 81 y.o. female who presented to [facility] ED on  from [facility] with reports of shortness of breath. There are current Covid cases at the facility.  According to staff at the [facility]-care facility, she had a rapid screen this morning that was negative.  EMS reports that she was wheezing when they arrived at the facility, they gave her a DuoNeb treatment and she was placed on supplemental oxygen in route which improved her symptoms.  Patient is a poor historian due to dementia.     In ED: All labs unremarkable except proBNP 7496, troponin 0.097, glucose 174, creatinine 1.69, WBC 11.6, hemoglobin 11.9.    Chest x-ray showed cardiomegaly with small pleural effusions and ill-defined pulmonary vessels, question pulmonary edema; bibasilar airspace opacity worse right than left question subsegmental atelectasis, pneumonia less likely; large hiatal hernia.     Patient was given IV lasix at admission for possible CHF and COPD exacerbation. Cardiology consulted. No plans for cardiac workup. Echo showed EF 65%.      Palliative consult for goals of care discussion in an elderly patient with multiple chronic health conditions.     VITAL SIGNS:   Temp:  [97.2 °F (36.2 °C)-97.6 °F (36.4 °C)] 97.2 °F (36.2 °C)  Heart Rate:  [59-76] 65  Resp:  [18-20] 18  BP: (131-142)/(70-83) 139/76       PMH:  anemia, GERD, COPD, Dementia, DM, HLD, HTN    Past Surgical History:   Procedure Laterality Date   • ENDOSCOPY N/A 1/2/2021    Procedure: ESOPHAGOGASTRODUODENOSCOPY;  Surgeon: Yoan Mendoza MD;  Location: [facility] ENDOSCOPY;  Service: Gastroenterology;  Laterality: N/A;  post: esophageal trauma from nasogastric tube, large hiatal hernia       Family History   Family history unknown: Yes       Social History     Tobacco Use   • Smoking status: Never Smoker   • Smokeless tobacco: Never Used   Vaping Use   • Vaping Use: Not on file   Substance Use Topics   • Alcohol use: Never   • Drug use: Never           LABS:    Results from last 7 days   Lab Units 12/17/21  0503   WBC 10*3/mm3 13.70*   HEMOGLOBIN g/dL 11.7*   HEMATOCRIT % 35.6   PLATELETS 10*3/mm3 304     Results from last 7 days   Lab Units 12/17/21  0503   SODIUM mmol/L 139   POTASSIUM mmol/L 4.1   CHLORIDE mmol/L 101   CO2 mmol/L 24.0   BUN mg/dL 58*   CREATININE mg/dL 2.41*   GLUCOSE mg/dL 258*   CALCIUM mg/dL 9.0     Results from last 7 days   Lab Units 12/17/21  0503 12/15/21  0636 12/14/21  1757   SODIUM mmol/L 139   < > 138   POTASSIUM mmol/L 4.1   < > 3.5   CHLORIDE mmol/L 101   < > 99   CO2 mmol/L 24.0   < > 27.0   BUN mg/dL 58*   < > 22   CREATININE mg/dL 2.41*   < > 1.69*   CALCIUM mg/dL 9.0   < > 8.9   BILIRUBIN mg/dL  --   --  0.6   ALK PHOS U/L  --   --  101   ALT (SGPT) U/L  --   --  14   AST (SGOT) U/L  --   --  29   GLUCOSE mg/dL 258*   < > 173*    < > = values in this interval not displayed.         IMAGING STUDIES:  No radiology results for the last day      I reviewed the patient's new clinical results including labs, imaging, and vitals.        Scheduled Meds:  albuterol, 2.5 mg, Nebulization, Once  aspirin, 81 mg, Oral, Daily  carbidopa-levodopa, 1 tablet, Oral, TID  clopidogrel, 75 mg, Oral, Daily  famotidine, 20 mg, Oral, Nightly  furosemide, 20 mg, Oral, BID  heparin (porcine), 5,000 Units, Subcutaneous, Q12H  insulin glargine, 20 Units,  Subcutaneous, Daily  insulin lispro, 0-24 Units, Subcutaneous, 4x Daily With Meals & Nightly  ipratropium-albuterol, 3 mL, Nebulization, 4x Daily - RT  metoprolol tartrate, 50 mg, Oral, BID  nystatin, , Topical, TID  pantoprazole, 40 mg, Oral, Daily  predniSONE, 40 mg, Oral, Daily With Breakfast  sodium chloride, 10 mL, Intravenous, Q12H      Continuous Infusions:       I have reviewed patient's current medication list.     Review of Systems   Constitutional: Positive for fatigue.   Respiratory: Positive for shortness of breath.    Neurological: Positive for weakness.   All other systems reviewed and are negative.        Physical Exam  Vitals and nursing note reviewed.   Constitutional:       Appearance: She is obese.   HENT:      Head: Normocephalic and atraumatic.      Nose: Nose normal.      Mouth/Throat:      Mouth: Mucous membranes are moist.      Pharynx: Oropharynx is clear.   Eyes:      Extraocular Movements: Extraocular movements intact.      Conjunctiva/sclera: Conjunctivae normal.      Pupils: Pupils are equal, round, and reactive to light.   Cardiovascular:      Rate and Rhythm: Normal rate.      Pulses: Normal pulses.   Pulmonary:      Effort: Pulmonary effort is normal.   Abdominal:      Palpations: Abdomen is soft.   Musculoskeletal:         General: Normal range of motion.      Cervical back: Normal range of motion.   Skin:     General: Skin is warm and dry.   Neurological:      General: No focal deficit present.      Mental Status: She is alert. She is disoriented.             PROBLEM LIST:    Chronic obstructive pulmonary disease with acute exacerbation (HCC)    Type 2 diabetes mellitus without complication, with long-term current use of insulin (HCC)    Hyperlipidemia    GERD (gastroesophageal reflux disease)    Hypertension    Dementia (HCC)    Atherosclerotic heart disease of native coronary artery without angina pectoris    Gout    Chronic respiratory failure with hypoxia (HCC)    Obesity (BMI  30-39.9)    Parkinson's disease (HCC)    Cardiomegaly    CKD (chronic kidney disease) stage 4, GFR 15-29 ml/min ([facility])          ASSESSMENT/PLAN:    COPD exacerbation: RVP negative. Started on steroids and supplemental oxygen. May benefit from antibiotics.     Cardiomegaly: With elevated proBNP. Noted on chest-xray. Cardiology consulted. Echo showed preserved LV function with estimated EF of 65%. Per cardiology, no plans for cardiac workup.     HLD/HTN/COPD/DM/Dementia: chronic conditions per primary.     These illnesses and their management contribute to the need for a palliative consult and advanced care planning.      ADVANCED CARE PLANNIN/17 Called and spoke with daughter this afternoon. Patient is pleasantly confused and minimally able to participate in conversation. We discussed the patients current medical status and overall goals of care. Daughter reiterates that the patient is a DNR/DNI. We discussed her plan of care. Daughter is wanting for the patient to return to her nursing facility at discharge. She states that she has been very content there for 8 years and is in relatively good health otherwise with only one hospitalization last year. We discussed hospice care vs continuation of treatment. I do not know if patient would meet criteria for hospice services given her overall functional status. Daughter is in agreement, stating she is not ready for hospice. I will change code status to DNR/DNI.       Advanced Directives: Patient has advance directive, copy in chart (POA)  Health Care Directive on file: No  Health Care Surrogate:      Palliative Performance Scale Score:    Comments:           Decisional Capacity: no  Patient's understanding of illness: unknown  Patient goals of care:  DNR/DNI      Thank you for this consult and allowing us to participate in patient's plan of care. Palliative Care Team will continue to follow patient.       I spent 58 minutes reviewing medical and medication  records, assessing and examining patient, discussing with family, answering questions, providing some guidance about a plan and documentation of care, and coordinating care with other healthcare members. More than 50% of time spent face to face discussing disease education, current clinical status, and medication management.     I spent an additional 21 minutes on advanced care planning, goals of care, and code status discussion.  I obtained consent for ACP discussion.     Tiarra Peguero, APRN  12/17/2021

## 2021-12-17 NOTE — PROGRESS NOTES
Referring Provider: Hospitalist    Reason for follow-up: Shortness of breath     Patient Care Team:  Giovani Mejia MD as PCP - General (Geriatric Medicine)    Subjective .  Feeling better now    Objective  Lying in bed comfortably     Review of Systems   Constitutional: Negative for fever and malaise/fatigue.   Cardiovascular: Negative for chest pain, dyspnea on exertion and palpitations.   Respiratory: Negative for cough and shortness of breath.    Skin: Negative for rash.   Gastrointestinal: Negative for abdominal pain, nausea and vomiting.   Neurological: Negative for focal weakness and headaches.   All other systems reviewed and are negative.      Codeine, Latex, and Namenda [memantine]    Scheduled Meds:albuterol, 2.5 mg, Nebulization, Once  aspirin, 81 mg, Oral, Daily  carbidopa-levodopa, 1 tablet, Oral, TID  clopidogrel, 75 mg, Oral, Daily  famotidine, 20 mg, Oral, Nightly  furosemide, 20 mg, Oral, BID  heparin (porcine), 5,000 Units, Subcutaneous, Q12H  insulin glargine, 20 Units, Subcutaneous, Daily  insulin lispro, 0-24 Units, Subcutaneous, 4x Daily With Meals & Nightly  ipratropium-albuterol, 3 mL, Nebulization, 4x Daily - RT  metoprolol tartrate, 50 mg, Oral, BID  nystatin, , Topical, TID  pantoprazole, 40 mg, Oral, Daily  predniSONE, 40 mg, Oral, Daily With Breakfast  sodium chloride, 10 mL, Intravenous, Q12H      Continuous Infusions:   PRN Meds:.•  acetaminophen **OR** acetaminophen **OR** acetaminophen  •  dextrose  •  dextrose  •  glucagon (human recombinant)  •  insulin lispro **AND** insulin lispro  •  ipratropium-albuterol  •  magnesium hydroxide  •  magnesium sulfate **OR** magnesium sulfate in D5W 1g/100mL (PREMIX)  •  nitroglycerin  •  ondansetron **OR** ondansetron  •  potassium chloride  •  potassium chloride  •  [COMPLETED] Insert peripheral IV **AND** sodium chloride  •  sodium chloride        VITAL SIGNS  Vitals:    12/17/21 0833 12/17/21 1145 12/17/21 1209 12/17/21 1213   BP:   "128/76     BP Location:  Left arm     Patient Position:  Lying     Pulse: 65 97 98 95   Resp: 18 16 16 16   Temp:  97.6 °F (36.4 °C)     TempSrc:  Oral     SpO2: 94% 97% 96% 98%   Weight:       Height:           Flowsheet Rows      First Filed Value   Admission Height 165.1 cm (65\") Documented at 12/14/2021 1546   Admission Weight 107 kg (235 lb) Documented at 12/14/2021 1546           TELEMETRY: Sinus rhythm    Physical Exam:  Constitutional:       Appearance: Well-developed.   Eyes:      General: No scleral icterus.     Conjunctiva/sclera: Conjunctivae normal.   HENT:      Head: Normocephalic and atraumatic.   Neck:      Vascular: No carotid bruit or JVD.   Pulmonary:      Effort: Pulmonary effort is normal.      Breath sounds: Normal breath sounds. No wheezing. No rales.   Cardiovascular:      Normal rate. Regular rhythm.      Murmurs: There is a systolic murmur.   Pulses:     Intact distal pulses.   Abdominal:      General: Bowel sounds are normal.      Palpations: Abdomen is soft.   Musculoskeletal:      Cervical back: Normal range of motion and neck supple. Skin:     General: Skin is warm and dry.      Findings: No rash.   Neurological:      Mental Status: Alert.          Results Review:   I reviewed the patient's new clinical results.  Lab Results (last 24 hours)     Procedure Component Value Units Date/Time    POC Glucose Once [[id number]]  (Abnormal) Collected: 12/17/21 1151    Specimen: Blood Updated: 12/17/21 1152     Glucose 321 mg/dL      Comment: Serial Number: [id number]:  [id number]       POC Glucose Once [[id number]]  (Abnormal) Collected: 12/17/21 0758    Specimen: Blood Updated: 12/17/21 0759     Glucose 261 mg/dL      Comment: Serial Number: [id number]:  [id number]       Basic Metabolic Panel [[id number]]  (Abnormal) Collected: 12/17/21 0503    Specimen: Blood Updated: 12/17/21 0611     Glucose 258 mg/dL      BUN 58 mg/dL      Creatinine 2.41 mg/dL      Sodium 139 mmol/L      Potassium " 4.1 mmol/L      Comment: Slight hemolysis detected by analyzer. Results may be affected.        Chloride 101 mmol/L      CO2 24.0 mmol/L      Calcium 9.0 mg/dL      eGFR Non African Amer 19 mL/min/1.73      BUN/Creatinine Ratio 24.1     Anion Gap 14.0 mmol/L     Narrative:      GFR Normal >60  Chronic Kidney Disease <60  Kidney Failure <15      Magnesium [[id number]]  (Normal) Collected: 12/17/21 0503    Specimen: Blood Updated: 12/17/21 0611     Magnesium 1.9 mg/dL     CBC & Differential [[id number]]  (Abnormal) Collected: 12/17/21 0503    Specimen: Blood Updated: 12/17/21 0557    Narrative:      The following orders were created for panel order CBC & Differential.  Procedure                               Abnormality         Status                     ---------                               -----------         ------                     CBC Auto Differential[[id number]]        Abnormal            Final result                 Please view results for these tests on the individual orders.    CBC Auto Differential [[id number]]  (Abnormal) Collected: 12/17/21 0503    Specimen: Blood Updated: 12/17/21 0557     WBC 13.70 10*3/mm3      RBC 3.72 10*6/mm3      Hemoglobin 11.7 g/dL      Hematocrit 35.6 %      MCV 95.7 fL      MCH 31.4 pg      MCHC 32.8 g/dL      RDW 15.0 %      RDW-SD 49.9 fl      MPV 8.8 fL      Platelets 304 10*3/mm3      Neutrophil % 92.4 %      Lymphocyte % 5.4 %      Monocyte % 2.0 %      Eosinophil % 0.1 %      Basophil % 0.1 %      Neutrophils, Absolute 12.70 10*3/mm3      Lymphocytes, Absolute 0.70 10*3/mm3      Monocytes, Absolute 0.30 10*3/mm3      Eosinophils, Absolute 0.00 10*3/mm3      Basophils, Absolute 0.00 10*3/mm3      nRBC 0.1 /100 WBC     POC Glucose Once [[id number]]  (Abnormal) Collected: 12/16/21 2000    Specimen: Blood Updated: 12/16/21 2001     Glucose 226 mg/dL      Comment: Serial Number: [id number]:  [id number]       POC Glucose Once [[id number]]  (Abnormal) Collected: 12/16/21  1621    Specimen: Blood Updated: 12/16/21 1622     Glucose 194 mg/dL      Comment: Serial Number: [id number]:  [id number]             Imaging Results (Last 24 Hours)     ** No results found for the last 24 hours. **          EKG      I personally viewed and interpreted the patient's EKG/Telemetry data:    ECHOCARDIOGRAM:    STRESS MYOVIEW:    CARDIAC CATHETERIZATION:    OTHER:         Assessment/Plan     Principal Problem:    Chronic obstructive pulmonary disease with acute exacerbation ([facility])  Active Problems:    Type 2 diabetes mellitus without complication, with long-term current use of insulin ([facility])    Hyperlipidemia    GERD (gastroesophageal reflux disease)    Hypertension    Dementia ([facility])    Atherosclerotic heart disease of native coronary artery without angina pectoris    Gout    Chronic respiratory failure with hypoxia ([facility])    Obesity (BMI 30-39.9)    Parkinson's disease ([facility])    Cardiomegaly    CKD (chronic kidney disease) stage 4, GFR 15-29 ml/min ([facility])       Patient presented with shortness of breath and is ruled out for MI by EKG enzymes  Patient had echocardiogram which showed normal V function with mild valvular heart disease  Blood pressure and heart rate stable  Patient sugar levels and lipid levels are followed by the primary care doctor  Patient has COPD with possible sleep apnea and pulmonary hypertension and will have work-up done for the same  We will not do any further cardiac work-up.    I discussed the patients findings and my recommendations with patient and nurse    Virgilio Recinos MD  12/17/21  15:08 EST

## 2021-12-17 NOTE — CASE MANAGEMENT/SOCIAL WORK
Continued Stay Note   Malcolm     Patient Name: Nory Duff  MRN: [id number]  Today's Date: 12/17/2021    Admit Date: 12/14/2021     Discharge Plan     Row Name 12/17/21 1555       Plan    Plan [facility] plan[facility]. No precert required. PASRR per facility.    Plan Comments CM confirmed with liaison that bed is available. Discharge orders in. Nursing notified of bed.    Final Discharge Disposition Code 03 - skilled nursing facility (SNF)              Expected Discharge Date and Time     Expected Discharge Date Expected Discharge Time    Dec 17, 2021         Phone communication or documentation only - no physical contact with patient or family.      Megan Naegele, RN      Office Phone: [phone]  Office Cell: [phone]

## 2021-12-19 LAB — QT INTERVAL: 396 MS

## 2022-05-05 ENCOUNTER — HOSPITAL ENCOUNTER (INPATIENT)
Facility: HOSPITAL | Age: 82
LOS: 5 days | Discharge: NURSING, REHAB OR LONG-TERM CARE | End: 2022-05-10
Attending: EMERGENCY MEDICINE | Admitting: HOSPITALIST

## 2022-05-05 ENCOUNTER — APPOINTMENT (OUTPATIENT)
Dept: CT IMAGING | Facility: HOSPITAL | Age: 82
End: 2022-05-05

## 2022-05-05 DIAGNOSIS — N39.0 URINARY TRACT INFECTION WITHOUT HEMATURIA, SITE UNSPECIFIED: ICD-10-CM

## 2022-05-05 DIAGNOSIS — R11.2 NAUSEA AND VOMITING, UNSPECIFIED VOMITING TYPE: Primary | ICD-10-CM

## 2022-05-05 DIAGNOSIS — K44.9 HIATAL HERNIA: ICD-10-CM

## 2022-05-05 LAB
ALBUMIN SERPL-MCNC: 3.6 G/DL (ref 3.5–5.2)
ALBUMIN/GLOB SERPL: 1 G/DL
ALP SERPL-CCNC: 101 U/L (ref 39–117)
ALT SERPL W P-5'-P-CCNC: <5 U/L (ref 1–33)
ANION GAP SERPL CALCULATED.3IONS-SCNC: 12 MMOL/L (ref 5–15)
AST SERPL-CCNC: 10 U/L (ref 1–32)
BACTERIA UR QL AUTO: ABNORMAL /HPF
BASOPHILS # BLD AUTO: 0 10*3/MM3 (ref 0–0.2)
BASOPHILS NFR BLD AUTO: 0.4 % (ref 0–1.5)
BILIRUB SERPL-MCNC: 0.4 MG/DL (ref 0–1.2)
BILIRUB UR QL STRIP: NEGATIVE
BUN SERPL-MCNC: 22 MG/DL (ref 8–23)
BUN/CREAT SERPL: 16.3 (ref 7–25)
CALCIUM SPEC-SCNC: 9.8 MG/DL (ref 8.6–10.5)
CHLORIDE SERPL-SCNC: 96 MMOL/L (ref 98–107)
CLARITY UR: ABNORMAL
CO2 SERPL-SCNC: 32 MMOL/L (ref 22–29)
COLOR UR: YELLOW
CREAT SERPL-MCNC: 1.35 MG/DL (ref 0.57–1)
DEPRECATED RDW RBC AUTO: 48.1 FL (ref 37–54)
EGFRCR SERPLBLD CKD-EPI 2021: 39.1 ML/MIN/1.73
EOSINOPHIL # BLD AUTO: 0.3 10*3/MM3 (ref 0–0.4)
EOSINOPHIL NFR BLD AUTO: 2.2 % (ref 0.3–6.2)
ERYTHROCYTE [DISTWIDTH] IN BLOOD BY AUTOMATED COUNT: 15.3 % (ref 12.3–15.4)
GLOBULIN UR ELPH-MCNC: 3.6 GM/DL
GLUCOSE SERPL-MCNC: 166 MG/DL (ref 65–99)
GLUCOSE UR STRIP-MCNC: NEGATIVE MG/DL
HCT VFR BLD AUTO: 41.4 % (ref 34–46.6)
HGB BLD-MCNC: 13.2 G/DL (ref 12–15.9)
HGB UR QL STRIP.AUTO: NEGATIVE
HYALINE CASTS UR QL AUTO: ABNORMAL /LPF
KETONES UR QL STRIP: NEGATIVE
LEUKOCYTE ESTERASE UR QL STRIP.AUTO: ABNORMAL
LIPASE SERPL-CCNC: 12 U/L (ref 13–60)
LYMPHOCYTES # BLD AUTO: 1.2 10*3/MM3 (ref 0.7–3.1)
LYMPHOCYTES NFR BLD AUTO: 9.5 % (ref 19.6–45.3)
MCH RBC QN AUTO: 28.9 PG (ref 26.6–33)
MCHC RBC AUTO-ENTMCNC: 32 G/DL (ref 31.5–35.7)
MCV RBC AUTO: 90.1 FL (ref 79–97)
MONOCYTES # BLD AUTO: 0.8 10*3/MM3 (ref 0.1–0.9)
MONOCYTES NFR BLD AUTO: 6 % (ref 5–12)
NEUTROPHILS NFR BLD AUTO: 10.3 10*3/MM3 (ref 1.7–7)
NEUTROPHILS NFR BLD AUTO: 81.9 % (ref 42.7–76)
NITRITE UR QL STRIP: NEGATIVE
NRBC BLD AUTO-RTO: 0 /100 WBC (ref 0–0.2)
PH UR STRIP.AUTO: 7 [PH] (ref 5–8)
PLATELET # BLD AUTO: 307 10*3/MM3 (ref 140–450)
PMV BLD AUTO: 7.6 FL (ref 6–12)
POTASSIUM SERPL-SCNC: 3.7 MMOL/L (ref 3.5–5.2)
PROT SERPL-MCNC: 7.2 G/DL (ref 6–8.5)
PROT UR QL STRIP: NEGATIVE
RBC # BLD AUTO: 4.59 10*6/MM3 (ref 3.77–5.28)
RBC # UR STRIP: ABNORMAL /HPF
REF LAB TEST METHOD: ABNORMAL
SODIUM SERPL-SCNC: 140 MMOL/L (ref 136–145)
SP GR UR STRIP: 1.01 (ref 1–1.03)
SQUAMOUS #/AREA URNS HPF: ABNORMAL /HPF
UROBILINOGEN UR QL STRIP: ABNORMAL
WBC # UR STRIP: ABNORMAL /HPF
WBC NRBC COR # BLD: 12.6 10*3/MM3 (ref 3.4–10.8)

## 2022-05-05 PROCEDURE — 25010000002 CEFTRIAXONE PER 250 MG: Performed by: EMERGENCY MEDICINE

## 2022-05-05 PROCEDURE — 99285 EMERGENCY DEPT VISIT HI MDM: CPT

## 2022-05-05 PROCEDURE — 25010000002 ONDANSETRON PER 1 MG: Performed by: EMERGENCY MEDICINE

## 2022-05-05 PROCEDURE — 99222 1ST HOSP IP/OBS MODERATE 55: CPT | Performed by: NURSE PRACTITIONER

## 2022-05-05 PROCEDURE — 74176 CT ABD & PELVIS W/O CONTRAST: CPT

## 2022-05-05 PROCEDURE — 83690 ASSAY OF LIPASE: CPT | Performed by: EMERGENCY MEDICINE

## 2022-05-05 PROCEDURE — 87086 URINE CULTURE/COLONY COUNT: CPT | Performed by: EMERGENCY MEDICINE

## 2022-05-05 PROCEDURE — 87186 SC STD MICRODIL/AGAR DIL: CPT | Performed by: EMERGENCY MEDICINE

## 2022-05-05 PROCEDURE — 87635 SARS-COV-2 COVID-19 AMP PRB: CPT | Performed by: EMERGENCY MEDICINE

## 2022-05-05 PROCEDURE — 87077 CULTURE AEROBIC IDENTIFY: CPT | Performed by: EMERGENCY MEDICINE

## 2022-05-05 PROCEDURE — P9612 CATHETERIZE FOR URINE SPEC: HCPCS

## 2022-05-05 PROCEDURE — 93005 ELECTROCARDIOGRAM TRACING: CPT | Performed by: NURSE PRACTITIONER

## 2022-05-05 PROCEDURE — 80053 COMPREHEN METABOLIC PANEL: CPT | Performed by: EMERGENCY MEDICINE

## 2022-05-05 PROCEDURE — 81001 URINALYSIS AUTO W/SCOPE: CPT | Performed by: EMERGENCY MEDICINE

## 2022-05-05 PROCEDURE — 85025 COMPLETE CBC W/AUTO DIFF WBC: CPT | Performed by: EMERGENCY MEDICINE

## 2022-05-05 RX ORDER — DEXTROSE MONOHYDRATE 25 G/50ML
25 INJECTION, SOLUTION INTRAVENOUS
Status: DISCONTINUED | OUTPATIENT
Start: 2022-05-05 | End: 2022-05-10 | Stop reason: HOSPADM

## 2022-05-05 RX ORDER — INSULIN LISPRO 100 [IU]/ML
0-14 INJECTION, SOLUTION INTRAVENOUS; SUBCUTANEOUS
Status: DISCONTINUED | OUTPATIENT
Start: 2022-05-06 | End: 2022-05-06

## 2022-05-05 RX ORDER — PANTOPRAZOLE SODIUM 40 MG/10ML
40 INJECTION, POWDER, LYOPHILIZED, FOR SOLUTION INTRAVENOUS
Status: DISCONTINUED | OUTPATIENT
Start: 2022-05-06 | End: 2022-05-06

## 2022-05-05 RX ORDER — SODIUM CHLORIDE 9 MG/ML
75 INJECTION, SOLUTION INTRAVENOUS CONTINUOUS
Status: DISCONTINUED | OUTPATIENT
Start: 2022-05-05 | End: 2022-05-09

## 2022-05-05 RX ORDER — SODIUM CHLORIDE 0.9 % (FLUSH) 0.9 %
10 SYRINGE (ML) INJECTION AS NEEDED
Status: DISCONTINUED | OUTPATIENT
Start: 2022-05-05 | End: 2022-05-10 | Stop reason: HOSPADM

## 2022-05-05 RX ORDER — INSULIN LISPRO 100 [IU]/ML
0-14 INJECTION, SOLUTION INTRAVENOUS; SUBCUTANEOUS AS NEEDED
Status: DISCONTINUED | OUTPATIENT
Start: 2022-05-05 | End: 2022-05-06

## 2022-05-05 RX ORDER — PANTOPRAZOLE SODIUM 40 MG/10ML
80 INJECTION, POWDER, LYOPHILIZED, FOR SOLUTION INTRAVENOUS ONCE
Status: COMPLETED | OUTPATIENT
Start: 2022-05-05 | End: 2022-05-05

## 2022-05-05 RX ORDER — SODIUM CHLORIDE 0.9 % (FLUSH) 0.9 %
3 SYRINGE (ML) INJECTION EVERY 12 HOURS SCHEDULED
Status: DISCONTINUED | OUTPATIENT
Start: 2022-05-05 | End: 2022-05-10 | Stop reason: HOSPADM

## 2022-05-05 RX ORDER — ONDANSETRON 2 MG/ML
4 INJECTION INTRAMUSCULAR; INTRAVENOUS ONCE
Status: COMPLETED | OUTPATIENT
Start: 2022-05-05 | End: 2022-05-05

## 2022-05-05 RX ORDER — NICOTINE POLACRILEX 4 MG
15 LOZENGE BUCCAL
Status: DISCONTINUED | OUTPATIENT
Start: 2022-05-05 | End: 2022-05-10 | Stop reason: HOSPADM

## 2022-05-05 RX ORDER — NITROGLYCERIN 0.4 MG/1
0.4 TABLET SUBLINGUAL
Status: DISCONTINUED | OUTPATIENT
Start: 2022-05-05 | End: 2022-05-06 | Stop reason: SDUPTHER

## 2022-05-05 RX ORDER — SODIUM CHLORIDE 0.9 % (FLUSH) 0.9 %
3-10 SYRINGE (ML) INJECTION AS NEEDED
Status: DISCONTINUED | OUTPATIENT
Start: 2022-05-05 | End: 2022-05-10 | Stop reason: HOSPADM

## 2022-05-05 RX ORDER — ONDANSETRON 4 MG/1
4 TABLET, FILM COATED ORAL EVERY 6 HOURS PRN
Status: DISCONTINUED | OUTPATIENT
Start: 2022-05-05 | End: 2022-05-10 | Stop reason: HOSPADM

## 2022-05-05 RX ORDER — OLANZAPINE 10 MG/2ML
1 INJECTION, POWDER, LYOPHILIZED, FOR SOLUTION INTRAMUSCULAR AS NEEDED
Status: DISCONTINUED | OUTPATIENT
Start: 2022-05-05 | End: 2022-05-10 | Stop reason: HOSPADM

## 2022-05-05 RX ORDER — ONDANSETRON 2 MG/ML
4 INJECTION INTRAMUSCULAR; INTRAVENOUS EVERY 6 HOURS PRN
Status: DISCONTINUED | OUTPATIENT
Start: 2022-05-05 | End: 2022-05-10 | Stop reason: HOSPADM

## 2022-05-05 RX ADMIN — Medication 3 ML: at 23:30

## 2022-05-05 RX ADMIN — ONDANSETRON 4 MG: 2 INJECTION INTRAMUSCULAR; INTRAVENOUS at 22:05

## 2022-05-05 RX ADMIN — Medication 10 ML: at 23:37

## 2022-05-05 RX ADMIN — CEFTRIAXONE 1 G: 10 INJECTION, POWDER, FOR SOLUTION INTRAVENOUS at 23:37

## 2022-05-05 RX ADMIN — SODIUM CHLORIDE 500 ML: 9 INJECTION, SOLUTION INTRAVENOUS at 22:07

## 2022-05-05 RX ADMIN — PANTOPRAZOLE SODIUM 80 MG: 40 INJECTION, POWDER, FOR SOLUTION INTRAVENOUS at 23:49

## 2022-05-06 ENCOUNTER — INPATIENT HOSPITAL (OUTPATIENT)
Dept: URBAN - METROPOLITAN AREA HOSPITAL 84 | Facility: HOSPITAL | Age: 82
End: 2022-05-06
Payer: MEDICARE

## 2022-05-06 ENCOUNTER — ANESTHESIA (OUTPATIENT)
Dept: GASTROENTEROLOGY | Facility: HOSPITAL | Age: 82
End: 2022-05-06

## 2022-05-06 ENCOUNTER — ANESTHESIA EVENT (OUTPATIENT)
Dept: GASTROENTEROLOGY | Facility: HOSPITAL | Age: 82
End: 2022-05-06

## 2022-05-06 VITALS
SYSTOLIC BLOOD PRESSURE: 118 MMHG | OXYGEN SATURATION: 100 % | DIASTOLIC BLOOD PRESSURE: 54 MMHG | HEART RATE: 68 BPM | TEMPERATURE: 97 F

## 2022-05-06 DIAGNOSIS — R11.2 NAUSEA WITH VOMITING, UNSPECIFIED: ICD-10-CM

## 2022-05-06 DIAGNOSIS — K44.9 DIAPHRAGMATIC HERNIA WITHOUT OBSTRUCTION OR GANGRENE: ICD-10-CM

## 2022-05-06 DIAGNOSIS — K21.00 GASTRO-ESOPHAGEAL REFLUX DISEASE WITH ESOPHAGITIS, WITHOUT B: ICD-10-CM

## 2022-05-06 PROBLEM — J44.9 COPD (CHRONIC OBSTRUCTIVE PULMONARY DISEASE): Status: ACTIVE | Noted: 2022-05-06

## 2022-05-06 LAB
ANION GAP SERPL CALCULATED.3IONS-SCNC: 13 MMOL/L (ref 5–15)
BASOPHILS # BLD AUTO: 0.1 10*3/MM3 (ref 0–0.2)
BASOPHILS NFR BLD AUTO: 1.2 % (ref 0–1.5)
BUN SERPL-MCNC: 21 MG/DL (ref 8–23)
BUN/CREAT SERPL: 15.7 (ref 7–25)
CALCIUM SPEC-SCNC: 9.3 MG/DL (ref 8.6–10.5)
CHLORIDE SERPL-SCNC: 98 MMOL/L (ref 98–107)
CO2 SERPL-SCNC: 31 MMOL/L (ref 22–29)
CREAT SERPL-MCNC: 1.34 MG/DL (ref 0.57–1)
DEPRECATED RDW RBC AUTO: 48.6 FL (ref 37–54)
EGFRCR SERPLBLD CKD-EPI 2021: 39.2 ML/MIN/1.73
EOSINOPHIL # BLD AUTO: 0.1 10*3/MM3 (ref 0–0.4)
EOSINOPHIL NFR BLD AUTO: 1.4 % (ref 0.3–6.2)
ERYTHROCYTE [DISTWIDTH] IN BLOOD BY AUTOMATED COUNT: 15.3 % (ref 12.3–15.4)
GLUCOSE BLDC GLUCOMTR-MCNC: 124 MG/DL (ref 70–105)
GLUCOSE BLDC GLUCOMTR-MCNC: 138 MG/DL (ref 70–105)
GLUCOSE BLDC GLUCOMTR-MCNC: 139 MG/DL (ref 70–105)
GLUCOSE BLDC GLUCOMTR-MCNC: 143 MG/DL (ref 70–105)
GLUCOSE SERPL-MCNC: 195 MG/DL (ref 65–99)
HBA1C MFR BLD: 7.5 % (ref 3.5–5.6)
HCT VFR BLD AUTO: 37.6 % (ref 34–46.6)
HGB BLD-MCNC: 12.5 G/DL (ref 12–15.9)
LYMPHOCYTES # BLD AUTO: 1.5 10*3/MM3 (ref 0.7–3.1)
LYMPHOCYTES NFR BLD AUTO: 13.6 % (ref 19.6–45.3)
MCH RBC QN AUTO: 30 PG (ref 26.6–33)
MCHC RBC AUTO-ENTMCNC: 33.3 G/DL (ref 31.5–35.7)
MCV RBC AUTO: 90.3 FL (ref 79–97)
MONOCYTES # BLD AUTO: 0.7 10*3/MM3 (ref 0.1–0.9)
MONOCYTES NFR BLD AUTO: 6.1 % (ref 5–12)
NEUTROPHILS NFR BLD AUTO: 77.7 % (ref 42.7–76)
NEUTROPHILS NFR BLD AUTO: 8.4 10*3/MM3 (ref 1.7–7)
NRBC BLD AUTO-RTO: 0 /100 WBC (ref 0–0.2)
PLATELET # BLD AUTO: 299 10*3/MM3 (ref 140–450)
PMV BLD AUTO: 7.5 FL (ref 6–12)
POTASSIUM SERPL-SCNC: 4 MMOL/L (ref 3.5–5.2)
RBC # BLD AUTO: 4.16 10*6/MM3 (ref 3.77–5.28)
SARS-COV-2 RNA PNL SPEC NAA+PROBE: NOT DETECTED
SODIUM SERPL-SCNC: 142 MMOL/L (ref 136–145)
WBC NRBC COR # BLD: 10.8 10*3/MM3 (ref 3.4–10.8)

## 2022-05-06 PROCEDURE — 25010000002 PROPOFOL 10 MG/ML EMULSION: Performed by: ANESTHESIOLOGIST ASSISTANT

## 2022-05-06 PROCEDURE — 25010000002 CEFTRIAXONE PER 250 MG: Performed by: NURSE PRACTITIONER

## 2022-05-06 PROCEDURE — 99232 SBSQ HOSP IP/OBS MODERATE 35: CPT | Performed by: HOSPITALIST

## 2022-05-06 PROCEDURE — 82962 GLUCOSE BLOOD TEST: CPT

## 2022-05-06 PROCEDURE — 0DH68UZ INSERTION OF FEEDING DEVICE INTO STOMACH, VIA NATURAL OR ARTIFICIAL OPENING ENDOSCOPIC: ICD-10-PCS | Performed by: INTERNAL MEDICINE

## 2022-05-06 PROCEDURE — 85025 COMPLETE CBC W/AUTO DIFF WBC: CPT | Performed by: NURSE PRACTITIONER

## 2022-05-06 PROCEDURE — 43246 EGD PLACE GASTROSTOMY TUBE: CPT | Performed by: INTERNAL MEDICINE

## 2022-05-06 PROCEDURE — C1889 IMPLANT/INSERT DEVICE, NOC: HCPCS | Performed by: INTERNAL MEDICINE

## 2022-05-06 PROCEDURE — 94799 UNLISTED PULMONARY SVC/PX: CPT

## 2022-05-06 PROCEDURE — 99221 1ST HOSP IP/OBS SF/LOW 40: CPT | Performed by: SURGERY

## 2022-05-06 PROCEDURE — 83036 HEMOGLOBIN GLYCOSYLATED A1C: CPT | Performed by: NURSE PRACTITIONER

## 2022-05-06 PROCEDURE — 36415 COLL VENOUS BLD VENIPUNCTURE: CPT | Performed by: NURSE PRACTITIONER

## 2022-05-06 PROCEDURE — 93010 ELECTROCARDIOGRAM REPORT: CPT | Performed by: INTERNAL MEDICINE

## 2022-05-06 PROCEDURE — 0DJ08ZZ INSPECTION OF UPPER INTESTINAL TRACT, VIA NATURAL OR ARTIFICIAL OPENING ENDOSCOPIC: ICD-10-PCS | Performed by: INTERNAL MEDICINE

## 2022-05-06 PROCEDURE — 94640 AIRWAY INHALATION TREATMENT: CPT

## 2022-05-06 PROCEDURE — 25010000002 ONDANSETRON PER 1 MG: Performed by: ANESTHESIOLOGIST ASSISTANT

## 2022-05-06 PROCEDURE — 80048 BASIC METABOLIC PNL TOTAL CA: CPT | Performed by: NURSE PRACTITIONER

## 2022-05-06 DEVICE — DEV CLIP ENDO RESOLUTION360 CONTRL ROT 235CM: Type: IMPLANTABLE DEVICE | Site: STOMACH | Status: FUNCTIONAL

## 2022-05-06 RX ORDER — ASPIRIN 81 MG/1
81 TABLET, CHEWABLE ORAL DAILY
Status: DISCONTINUED | OUTPATIENT
Start: 2022-05-06 | End: 2022-05-06

## 2022-05-06 RX ORDER — CLOPIDOGREL BISULFATE 75 MG/1
75 TABLET ORAL DAILY
Status: DISCONTINUED | OUTPATIENT
Start: 2022-05-06 | End: 2022-05-06

## 2022-05-06 RX ORDER — NITROGLYCERIN 0.4 MG/1
0.4 TABLET SUBLINGUAL
Status: DISCONTINUED | OUTPATIENT
Start: 2022-05-06 | End: 2022-05-10 | Stop reason: HOSPADM

## 2022-05-06 RX ORDER — INSULIN LISPRO 100 [IU]/ML
0-9 INJECTION, SOLUTION INTRAVENOUS; SUBCUTANEOUS AS NEEDED
Status: DISCONTINUED | OUTPATIENT
Start: 2022-05-06 | End: 2022-05-10 | Stop reason: HOSPADM

## 2022-05-06 RX ORDER — ACETAMINOPHEN 325 MG/1
650 TABLET ORAL EVERY 6 HOURS PRN
Status: DISCONTINUED | OUTPATIENT
Start: 2022-05-06 | End: 2022-05-10 | Stop reason: HOSPADM

## 2022-05-06 RX ORDER — INSULIN GLARGINE 100 [IU]/ML
10 INJECTION, SOLUTION SUBCUTANEOUS DAILY
Status: DISCONTINUED | OUTPATIENT
Start: 2022-05-06 | End: 2022-05-06

## 2022-05-06 RX ORDER — SODIUM CHLORIDE 0.9 % (FLUSH) 0.9 %
3 SYRINGE (ML) INJECTION EVERY 12 HOURS SCHEDULED
Status: DISCONTINUED | OUTPATIENT
Start: 2022-05-06 | End: 2022-05-10 | Stop reason: HOSPADM

## 2022-05-06 RX ORDER — SODIUM CHLORIDE 0.9 % (FLUSH) 0.9 %
3-10 SYRINGE (ML) INJECTION AS NEEDED
Status: DISCONTINUED | OUTPATIENT
Start: 2022-05-06 | End: 2022-05-10 | Stop reason: HOSPADM

## 2022-05-06 RX ORDER — ALLOPURINOL 100 MG/1
100 TABLET ORAL DAILY
Status: DISCONTINUED | OUTPATIENT
Start: 2022-05-06 | End: 2022-05-06

## 2022-05-06 RX ORDER — IPRATROPIUM BROMIDE AND ALBUTEROL SULFATE 2.5; .5 MG/3ML; MG/3ML
3 SOLUTION RESPIRATORY (INHALATION) EVERY 4 HOURS PRN
Status: DISCONTINUED | OUTPATIENT
Start: 2022-05-06 | End: 2022-05-10 | Stop reason: HOSPADM

## 2022-05-06 RX ORDER — ONDANSETRON 2 MG/ML
4 INJECTION INTRAMUSCULAR; INTRAVENOUS ONCE AS NEEDED
Status: CANCELLED | OUTPATIENT
Start: 2022-05-06

## 2022-05-06 RX ORDER — DEXTROSE MONOHYDRATE 25 G/50ML
25 INJECTION, SOLUTION INTRAVENOUS
Status: DISCONTINUED | OUTPATIENT
Start: 2022-05-06 | End: 2022-05-06 | Stop reason: SDUPTHER

## 2022-05-06 RX ORDER — BUDESONIDE AND FORMOTEROL FUMARATE DIHYDRATE 160; 4.5 UG/1; UG/1
2 AEROSOL RESPIRATORY (INHALATION)
Status: DISCONTINUED | OUTPATIENT
Start: 2022-05-06 | End: 2022-05-10 | Stop reason: HOSPADM

## 2022-05-06 RX ORDER — PANTOPRAZOLE SODIUM 40 MG/10ML
40 INJECTION, POWDER, LYOPHILIZED, FOR SOLUTION INTRAVENOUS EVERY 12 HOURS SCHEDULED
Status: DISCONTINUED | OUTPATIENT
Start: 2022-05-06 | End: 2022-05-09 | Stop reason: ALTCHOICE

## 2022-05-06 RX ORDER — OLANZAPINE 10 MG/2ML
1 INJECTION, POWDER, LYOPHILIZED, FOR SOLUTION INTRAMUSCULAR
Status: DISCONTINUED | OUTPATIENT
Start: 2022-05-06 | End: 2022-05-06 | Stop reason: SDUPTHER

## 2022-05-06 RX ORDER — LABETALOL HYDROCHLORIDE 5 MG/ML
5 INJECTION, SOLUTION INTRAVENOUS
Status: CANCELLED | OUTPATIENT
Start: 2022-05-06

## 2022-05-06 RX ORDER — FUROSEMIDE 20 MG/1
20 TABLET ORAL
Status: DISCONTINUED | OUTPATIENT
Start: 2022-05-06 | End: 2022-05-06

## 2022-05-06 RX ORDER — ALBUTEROL SULFATE 2.5 MG/3ML
2.5 SOLUTION RESPIRATORY (INHALATION) EVERY 4 HOURS PRN
Status: DISCONTINUED | OUTPATIENT
Start: 2022-05-06 | End: 2022-05-10 | Stop reason: HOSPADM

## 2022-05-06 RX ORDER — PANTOPRAZOLE SODIUM 40 MG/1
40 TABLET, DELAYED RELEASE ORAL DAILY
Status: DISCONTINUED | OUTPATIENT
Start: 2022-05-06 | End: 2022-05-06

## 2022-05-06 RX ORDER — NYSTATIN 100000 [USP'U]/G
POWDER TOPICAL 3 TIMES DAILY
Status: DISCONTINUED | OUTPATIENT
Start: 2022-05-06 | End: 2022-05-10 | Stop reason: HOSPADM

## 2022-05-06 RX ORDER — CLOPIDOGREL BISULFATE 75 MG/1
75 TABLET ORAL DAILY
Status: DISCONTINUED | OUTPATIENT
Start: 2022-05-06 | End: 2022-05-07

## 2022-05-06 RX ORDER — PROPOFOL 10 MG/ML
VIAL (ML) INTRAVENOUS AS NEEDED
Status: DISCONTINUED | OUTPATIENT
Start: 2022-05-06 | End: 2022-05-06 | Stop reason: SURG

## 2022-05-06 RX ORDER — ONDANSETRON 2 MG/ML
INJECTION INTRAMUSCULAR; INTRAVENOUS AS NEEDED
Status: DISCONTINUED | OUTPATIENT
Start: 2022-05-06 | End: 2022-05-06 | Stop reason: SURG

## 2022-05-06 RX ORDER — ALLOPURINOL 100 MG/1
100 TABLET ORAL DAILY
Status: DISCONTINUED | OUTPATIENT
Start: 2022-05-06 | End: 2022-05-09

## 2022-05-06 RX ORDER — NICOTINE POLACRILEX 4 MG
15 LOZENGE BUCCAL
Status: DISCONTINUED | OUTPATIENT
Start: 2022-05-06 | End: 2022-05-06 | Stop reason: SDUPTHER

## 2022-05-06 RX ORDER — ASPIRIN 81 MG/1
81 TABLET, CHEWABLE ORAL DAILY
Status: DISCONTINUED | OUTPATIENT
Start: 2022-05-06 | End: 2022-05-09

## 2022-05-06 RX ORDER — METOPROLOL TARTRATE 50 MG/1
50 TABLET, FILM COATED ORAL 2 TIMES DAILY
Status: DISCONTINUED | OUTPATIENT
Start: 2022-05-06 | End: 2022-05-06

## 2022-05-06 RX ORDER — INSULIN LISPRO 100 [IU]/ML
0-9 INJECTION, SOLUTION INTRAVENOUS; SUBCUTANEOUS EVERY 6 HOURS SCHEDULED
Status: DISCONTINUED | OUTPATIENT
Start: 2022-05-06 | End: 2022-05-10 | Stop reason: HOSPADM

## 2022-05-06 RX ORDER — ASPIRIN 81 MG/1
81 TABLET ORAL DAILY
Status: DISCONTINUED | OUTPATIENT
Start: 2022-05-06 | End: 2022-05-06

## 2022-05-06 RX ORDER — METOPROLOL TARTRATE 50 MG/1
50 TABLET, FILM COATED ORAL 2 TIMES DAILY
Status: DISCONTINUED | OUTPATIENT
Start: 2022-05-06 | End: 2022-05-09

## 2022-05-06 RX ORDER — LIDOCAINE HYDROCHLORIDE 20 MG/ML
INJECTION, SOLUTION EPIDURAL; INFILTRATION; INTRACAUDAL; PERINEURAL AS NEEDED
Status: DISCONTINUED | OUTPATIENT
Start: 2022-05-06 | End: 2022-05-06 | Stop reason: SURG

## 2022-05-06 RX ADMIN — CLOPIDOGREL BISULFATE 75 MG: 75 TABLET ORAL at 16:29

## 2022-05-06 RX ADMIN — PROPOFOL 20 MG: 10 INJECTION, EMULSION INTRAVENOUS at 10:27

## 2022-05-06 RX ADMIN — SODIUM CHLORIDE 125 ML/HR: 9 INJECTION, SOLUTION INTRAVENOUS at 13:11

## 2022-05-06 RX ADMIN — ASPIRIN 81 MG: 81 TABLET, CHEWABLE ORAL at 16:29

## 2022-05-06 RX ADMIN — LIDOCAINE HYDROCHLORIDE 100 MG: 20 INJECTION, SOLUTION EPIDURAL; INFILTRATION; INTRACAUDAL; PERINEURAL at 10:01

## 2022-05-06 RX ADMIN — PROPOFOL 20 MG: 10 INJECTION, EMULSION INTRAVENOUS at 10:32

## 2022-05-06 RX ADMIN — PROPOFOL 20 MG: 10 INJECTION, EMULSION INTRAVENOUS at 10:15

## 2022-05-06 RX ADMIN — BUDESONIDE AND FORMOTEROL FUMARATE DIHYDRATE 2 PUFF: 160; 4.5 AEROSOL RESPIRATORY (INHALATION) at 20:10

## 2022-05-06 RX ADMIN — PROPOFOL 20 MG: 10 INJECTION, EMULSION INTRAVENOUS at 10:24

## 2022-05-06 RX ADMIN — PROPOFOL 20 MG: 10 INJECTION, EMULSION INTRAVENOUS at 10:18

## 2022-05-06 RX ADMIN — SODIUM CHLORIDE 100 ML/HR: 9 INJECTION, SOLUTION INTRAVENOUS at 00:44

## 2022-05-06 RX ADMIN — PROPOFOL 30 MG: 10 INJECTION, EMULSION INTRAVENOUS at 10:07

## 2022-05-06 RX ADMIN — PROPOFOL 20 MG: 10 INJECTION, EMULSION INTRAVENOUS at 10:13

## 2022-05-06 RX ADMIN — PROPOFOL 20 MG: 10 INJECTION, EMULSION INTRAVENOUS at 10:09

## 2022-05-06 RX ADMIN — ALLOPURINOL 100 MG: 100 TABLET ORAL at 16:29

## 2022-05-06 RX ADMIN — PROPOFOL 30 MG: 10 INJECTION, EMULSION INTRAVENOUS at 10:05

## 2022-05-06 RX ADMIN — PROPOFOL 20 MG: 10 INJECTION, EMULSION INTRAVENOUS at 10:21

## 2022-05-06 RX ADMIN — NYSTATIN 1 APPLICATION: 100000 POWDER TOPICAL at 21:50

## 2022-05-06 RX ADMIN — CARBIDOPA AND LEVODOPA 1 TABLET: 25; 100 TABLET ORAL at 16:29

## 2022-05-06 RX ADMIN — CARBIDOPA AND LEVODOPA 1 TABLET: 25; 100 TABLET ORAL at 21:50

## 2022-05-06 RX ADMIN — PROPOFOL 40 MG: 10 INJECTION, EMULSION INTRAVENOUS at 10:01

## 2022-05-06 RX ADMIN — NYSTATIN: 100000 POWDER TOPICAL at 16:29

## 2022-05-06 RX ADMIN — PANTOPRAZOLE SODIUM 40 MG: 40 INJECTION, POWDER, FOR SOLUTION INTRAVENOUS at 21:50

## 2022-05-06 RX ADMIN — PROPOFOL 20 MG: 10 INJECTION, EMULSION INTRAVENOUS at 10:03

## 2022-05-06 RX ADMIN — ONDANSETRON 4 MG: 2 INJECTION INTRAMUSCULAR; INTRAVENOUS at 10:01

## 2022-05-06 RX ADMIN — PROPOFOL 20 MG: 10 INJECTION, EMULSION INTRAVENOUS at 10:11

## 2022-05-06 RX ADMIN — METOPROLOL TARTRATE 50 MG: 50 TABLET, FILM COATED ORAL at 21:50

## 2022-05-06 RX ADMIN — PANTOPRAZOLE SODIUM 40 MG: 40 INJECTION, POWDER, FOR SOLUTION INTRAVENOUS at 05:33

## 2022-05-06 RX ADMIN — PROPOFOL 20 MG: 10 INJECTION, EMULSION INTRAVENOUS at 10:30

## 2022-05-06 RX ADMIN — CEFTRIAXONE 1 G: 1 INJECTION, POWDER, FOR SOLUTION INTRAMUSCULAR; INTRAVENOUS at 22:56

## 2022-05-06 NOTE — ED PROVIDER NOTES
"Subjective   History of Present Illness  Vomiting  81-year-old female presents from the extended care facility with reports of 3 episodes of vomiting today.  She states she had \"a tummy ache \"earlier but is not hurting now.  She reports no diarrhea or constipation.  She reports no dysuria.  There is no reported fevers or chills.  Patient review of system and history are limited due to her chronic dementia.  Review of Systems   Unable to perform ROS: Dementia       Past Medical History:   Diagnosis Date   • Anemia    • Atherosclerotic heart disease of native coronary artery without angina pectoris    • COPD (chronic obstructive pulmonary disease) (Formerly [facility])    • COVID-19 03/2020   • Dementia (Formerly [facility])    • Diabetes mellitus (Formerly [facility])    • GERD (gastroesophageal reflux disease)    • Gout    • Hyperlipidemia    • Hypertension    • Localized edema    • Parkinson's disease (Formerly [facility])    • Vitamin D deficiency        Allergies   Allergen Reactions   • Codeine Anaphylaxis   • Latex Anaphylaxis   • Namenda [Memantine] Anaphylaxis       Past Surgical History:   Procedure Laterality Date   • ENDOSCOPY N/A 1/2/2021    Procedure: ESOPHAGOGASTRODUODENOSCOPY;  Surgeon: Yoan Mendoza MD;  Location: [facility] ENDOSCOPY;  Service: Gastroenterology;  Laterality: N/A;  post: esophageal trauma from nasogastric tube, large hiatal hernia       Family History   Family history unknown: Yes       Social History     Socioeconomic History   • Marital status:    Tobacco Use   • Smoking status: Never Smoker   • Smokeless tobacco: Never Used   Substance and Sexual Activity   • Alcohol use: Never   • Drug use: Never   • Sexual activity: Defer       Prior to Admission medications    Medication Sig Start Date End Date Taking? Authorizing Provider   acetaminophen (TYLENOL) 325 MG tablet Take 650 mg by mouth Every 6 (Six) Hours As Needed for Mild Pain  or Fever.    Provider, MD Lesa   albuterol (PROVENTIL) (2.5 MG/3ML) 0.083% nebulizer solution Take 2.5 " mg by nebulization Every 4 (Four) Hours As Needed for Wheezing. 12/17/21   Cordell Delaney MD   allopurinol (ZYLOPRIM) 100 MG tablet Take 200 mg by mouth Daily.    Lesa Silverman MD   aspirin 81 MG EC tablet Take 81 mg by mouth Daily.    Lesa Silverman MD   budesonide-formoterol (Symbicort) 160-4.5 MCG/ACT inhaler Inhale 2 puffs 2 (Two) Times a Day. 12/17/21   Cordell Delaney MD   carbidopa-levodopa (SINEMET)  MG per tablet Take 1 tablet by mouth 3 (Three) Times a Day.    Lesa Silverman MD   clopidogrel (PLAVIX) 75 MG tablet Take 75 mg by mouth Daily.    Lesa Silverman MD   furosemide (LASIX) 20 MG tablet Take 1 tablet by mouth 2 (Two) Times a Day. q day x 7 days, check bmp in one week. Then increase to BID 12/17/21   Cordell Delaney MD   Glucagon, rDNA, (Glucagon Emergency) 1 MG kit Inject  as directed As Needed.    Lesa Silverman MD   insulin glargine (LANTUS, SEMGLEE) 100 UNIT/ML injection Inject 20 Units under the skin into the appropriate area as directed Daily. 12/17/21   Cordell Delaney MD   ipratropium-albuterol (DUO-NEB) 0.5-2.5 mg/3 ml nebulizer Take 3 mL by nebulization 4 (Four) Times a Day. 12/17/21   Cordell Delaney MD   magnesium hydroxide (MILK OF MAGNESIA) 400 MG/5ML suspension Take 30 mL by mouth Daily As Needed for Constipation.    Lesa Silverman MD   metoprolol tartrate (LOPRESSOR) 50 MG tablet Take 50 mg by mouth 2 (Two) Times a Day.    Lesa Silverman MD   nitroglycerin (NITROSTAT) 0.4 MG SL tablet Place 1 tablet under the tongue Every 5 (Five) Minutes As Needed for Chest Pain (Only if SBP Greater Than 100). Take no more than 3 doses in 15 minutes. 12/17/21   Cordell Delaney MD   nystatin (MYCOSTATIN) 242963 UNIT/GM powder Apply  topically to the appropriate area as directed 3 (Three) Times a Day. Under breasts/ abd folds    Provider, MD Lesa   Omega-3 Fatty Acids (fish oil) 1000 MG capsule capsule Take  "1,000 mg by mouth Daily With Breakfast.    Provider, MD Lesa   pantoprazole (PROTONIX) 40 MG EC tablet Take 1 tablet by mouth Daily. 1/3/21   Napoleon Hicks MD   predniSONE (DELTASONE) 20 MG tablet Take 2 tablets by mouth Daily With Breakfast. 40 mg p.o. daily x3 days, 30 mg p.o. daily x3 days, 20 mg p.o. daily x3 days, 10 mg p.o. daily x3 days, 12/18/21   Cordell Delaney MD   selenium sulfide (SELSUN) 1 % lotion Apply  topically to the appropriate area as directed Every 7 (Seven) Days. Tuesday    Provider, MD Lesa     Blood pressure 130/80, pulse 66, temperature 98.3 °F (36.8 °C), temperature source Oral, resp. rate 18, height 160 cm (62.99\"), weight 82.3 kg (181 lb 7 oz), SpO2 95 %, not currently breastfeeding.    I examined the patient using the appropriate personal protective equipment.        Objective   Physical Exam  General: Well-developed elderly female, well-appearing, no acute distress, alert and appropriate  Eyes:  sclera nonicteric  HEENT: Mucous membranes moist, no mucosal swelling  Neck: Supple, no nuchal rigidity,  Respirations: Respirations nonlabored, equal breath sounds bilaterally, clear lungs  Heart regular rate and rhythm, no murmurs rubs or gallops,   Abdomen soft nontender nondistended, no hepatosplenomegaly, no hernia, no mass, normal bowel sounds, no CVA tenderness  Extremities no clubbing cyanosis or edema, calves are symmetric and nontender  Neuro cranial nerves grossly intact, no focal limb deficits  Psych oriented to person and place, cooperative  Skin no rash, brisk cap refill  Procedures           ED Course            Results for orders placed or performed during the hospital encounter of 05/05/22   Urine Culture - Urine, Urine, Catheter    Specimen: Urine, Catheter   Result Value Ref Range    Urine Culture >100,000 CFU/mL Escherichia coli (A)        Susceptibility    Escherichia coli - ERNESTO     Ampicillin  Susceptible ug/ml     Ampicillin + Sulbactam  Susceptible ug/ml "     Cefazolin  Susceptible ug/ml     Cefepime  Susceptible ug/ml     Ceftazidime  Susceptible ug/ml     Ceftriaxone  Susceptible ug/ml     Gentamicin  Susceptible ug/ml     Levofloxacin  Susceptible ug/ml     Nitrofurantoin  Susceptible ug/ml     Piperacillin + Tazobactam  Susceptible ug/ml     Trimethoprim + Sulfamethoxazole  Susceptible ug/ml   COVID-19,CEPHEID/JOSE,COR/AGUEDA/PAD/LAURIE IN-HOUSE(OR EMERGENT/ADD-ON),NP SWAB IN TRANSPORT MEDIA 3-4 HR TAT, RT-PCR - Swab, Nasopharynx    Specimen: Nasopharynx; Swab   Result Value Ref Range    COVID19 Not Detected Not Detected - Ref. Range   Comprehensive Metabolic Panel    Specimen: Blood   Result Value Ref Range    Glucose 166 (H) 65 - 99 mg/dL    BUN 22 8 - 23 mg/dL    Creatinine 1.35 (H) 0.57 - 1.00 mg/dL    Sodium 140 136 - 145 mmol/L    Potassium 3.7 3.5 - 5.2 mmol/L    Chloride 96 (L) 98 - 107 mmol/L    CO2 32.0 (H) 22.0 - 29.0 mmol/L    Calcium 9.8 8.6 - 10.5 mg/dL    Total Protein 7.2 6.0 - 8.5 g/dL    Albumin 3.60 3.50 - 5.20 g/dL    ALT (SGPT) <5 1 - 33 U/L    AST (SGOT) 10 1 - 32 U/L    Alkaline Phosphatase 101 39 - 117 U/L    Total Bilirubin 0.4 0.0 - 1.2 mg/dL    Globulin 3.6 gm/dL    A/G Ratio 1.0 g/dL    BUN/Creatinine Ratio 16.3 7.0 - 25.0    Anion Gap 12.0 5.0 - 15.0 mmol/L    eGFR 39.1 (L) >60.0 mL/min/1.73   Lipase    Specimen: Blood   Result Value Ref Range    Lipase 12 (L) 13 - 60 U/L   Urinalysis With Culture If Indicated - Urine, Catheter    Specimen: Urine, Catheter   Result Value Ref Range    Color, UA Yellow Yellow, Straw    Appearance, UA Cloudy (A) Clear    pH, UA 7.0 5.0 - 8.0    Specific Gravity, UA 1.011 1.005 - 1.030    Glucose, UA Negative Negative    Ketones, UA Negative Negative    Bilirubin, UA Negative Negative    Blood, UA Negative Negative    Protein, UA Negative Negative    Leuk Esterase, UA Moderate (2+) (A) Negative    Nitrite, UA Negative Negative    Urobilinogen, UA 1.0 E.U./dL 0.2 - 1.0 E.U./dL   CBC Auto Differential     Specimen: Blood   Result Value Ref Range    WBC 12.60 (H) 3.40 - 10.80 10*3/mm3    RBC 4.59 3.77 - 5.28 10*6/mm3    Hemoglobin 13.2 12.0 - 15.9 g/dL    Hematocrit 41.4 34.0 - 46.6 %    MCV 90.1 79.0 - 97.0 fL    MCH 28.9 26.6 - 33.0 pg    MCHC 32.0 31.5 - 35.7 g/dL    RDW 15.3 12.3 - 15.4 %    RDW-SD 48.1 37.0 - 54.0 fl    MPV 7.6 6.0 - 12.0 fL    Platelets 307 140 - 450 10*3/mm3    Neutrophil % 81.9 (H) 42.7 - 76.0 %    Lymphocyte % 9.5 (L) 19.6 - 45.3 %    Monocyte % 6.0 5.0 - 12.0 %    Eosinophil % 2.2 0.3 - 6.2 %    Basophil % 0.4 0.0 - 1.5 %    Neutrophils, Absolute 10.30 (H) 1.70 - 7.00 10*3/mm3    Lymphocytes, Absolute 1.20 0.70 - 3.10 10*3/mm3    Monocytes, Absolute 0.80 0.10 - 0.90 10*3/mm3    Eosinophils, Absolute 0.30 0.00 - 0.40 10*3/mm3    Basophils, Absolute 0.00 0.00 - 0.20 10*3/mm3    nRBC 0.0 0.0 - 0.2 /100 WBC   Urinalysis, Microscopic Only - Urine, Catheter    Specimen: Urine, Catheter   Result Value Ref Range    RBC, UA 0-2 (A) None Seen /HPF    WBC, UA 6-12 (A) None Seen /HPF    Bacteria, UA 4+ (A) None Seen /HPF    Squamous Epithelial Cells, UA 0-2 None Seen, 0-2 /HPF    Hyaline Casts, UA None Seen None Seen /LPF    Methodology Manual Light Microscopy    Hemoglobin A1c    Specimen: Blood   Result Value Ref Range    Hemoglobin A1C 7.5 (H) 3.5 - 5.6 %   Basic Metabolic Panel    Specimen: Blood   Result Value Ref Range    Glucose 195 (H) 65 - 99 mg/dL    BUN 21 8 - 23 mg/dL    Creatinine 1.34 (H) 0.57 - 1.00 mg/dL    Sodium 142 136 - 145 mmol/L    Potassium 4.0 3.5 - 5.2 mmol/L    Chloride 98 98 - 107 mmol/L    CO2 31.0 (H) 22.0 - 29.0 mmol/L    Calcium 9.3 8.6 - 10.5 mg/dL    BUN/Creatinine Ratio 15.7 7.0 - 25.0    Anion Gap 13.0 5.0 - 15.0 mmol/L    eGFR 39.2 (L) >60.0 mL/min/1.73   CBC Auto Differential    Specimen: Blood   Result Value Ref Range    WBC 10.80 3.40 - 10.80 10*3/mm3    RBC 4.16 3.77 - 5.28 10*6/mm3    Hemoglobin 12.5 12.0 - 15.9 g/dL    Hematocrit 37.6 34.0 - 46.6 %    MCV 90.3  79.0 - 97.0 fL    MCH 30.0 26.6 - 33.0 pg    MCHC 33.3 31.5 - 35.7 g/dL    RDW 15.3 12.3 - 15.4 %    RDW-SD 48.6 37.0 - 54.0 fl    MPV 7.5 6.0 - 12.0 fL    Platelets 299 140 - 450 10*3/mm3    Neutrophil % 77.7 (H) 42.7 - 76.0 %    Lymphocyte % 13.6 (L) 19.6 - 45.3 %    Monocyte % 6.1 5.0 - 12.0 %    Eosinophil % 1.4 0.3 - 6.2 %    Basophil % 1.2 0.0 - 1.5 %    Neutrophils, Absolute 8.40 (H) 1.70 - 7.00 10*3/mm3    Lymphocytes, Absolute 1.50 0.70 - 3.10 10*3/mm3    Monocytes, Absolute 0.70 0.10 - 0.90 10*3/mm3    Eosinophils, Absolute 0.10 0.00 - 0.40 10*3/mm3    Basophils, Absolute 0.10 0.00 - 0.20 10*3/mm3    nRBC 0.0 0.0 - 0.2 /100 WBC   Comprehensive Metabolic Panel    Specimen: Blood   Result Value Ref Range    Glucose 123 (H) 65 - 99 mg/dL    BUN 17 8 - 23 mg/dL    Creatinine 1.28 (H) 0.57 - 1.00 mg/dL    Sodium 143 136 - 145 mmol/L    Potassium 3.8 3.5 - 5.2 mmol/L    Chloride 104 98 - 107 mmol/L    CO2 29.0 22.0 - 29.0 mmol/L    Calcium 9.0 8.6 - 10.5 mg/dL    Total Protein 5.9 (L) 6.0 - 8.5 g/dL    Albumin 2.90 (L) 3.50 - 5.20 g/dL    ALT (SGPT) <5 1 - 33 U/L    AST (SGOT) 8 1 - 32 U/L    Alkaline Phosphatase 81 39 - 117 U/L    Total Bilirubin 0.3 0.0 - 1.2 mg/dL    Globulin 3.0 gm/dL    A/G Ratio 1.0 g/dL    BUN/Creatinine Ratio 13.3 7.0 - 25.0    Anion Gap 10.0 5.0 - 15.0 mmol/L    eGFR 41.4 (L) >60.0 mL/min/1.73   CBC Auto Differential    Specimen: Blood   Result Value Ref Range    WBC 8.70 3.40 - 10.80 10*3/mm3    RBC 3.58 (L) 3.77 - 5.28 10*6/mm3    Hemoglobin 10.8 (L) 12.0 - 15.9 g/dL    Hematocrit 32.7 (L) 34.0 - 46.6 %    MCV 91.4 79.0 - 97.0 fL    MCH 30.1 26.6 - 33.0 pg    MCHC 33.0 31.5 - 35.7 g/dL    RDW 15.8 (H) 12.3 - 15.4 %    RDW-SD 52.1 37.0 - 54.0 fl    MPV 8.0 6.0 - 12.0 fL    Platelets 283 140 - 450 10*3/mm3    Neutrophil % 72.4 42.7 - 76.0 %    Lymphocyte % 14.4 (L) 19.6 - 45.3 %    Monocyte % 8.0 5.0 - 12.0 %    Eosinophil % 4.1 0.3 - 6.2 %    Basophil % 1.1 0.0 - 1.5 %    Neutrophils,  Absolute 6.30 1.70 - 7.00 10*3/mm3    Lymphocytes, Absolute 1.20 0.70 - 3.10 10*3/mm3    Monocytes, Absolute 0.70 0.10 - 0.90 10*3/mm3    Eosinophils, Absolute 0.40 0.00 - 0.40 10*3/mm3    Basophils, Absolute 0.10 0.00 - 0.20 10*3/mm3    nRBC 0.1 0.0 - 0.2 /100 WBC   Basic Metabolic Panel    Specimen: Blood   Result Value Ref Range    Glucose 115 (H) 65 - 99 mg/dL    BUN 13 8 - 23 mg/dL    Creatinine 1.21 (H) 0.57 - 1.00 mg/dL    Sodium 141 136 - 145 mmol/L    Potassium 3.7 3.5 - 5.2 mmol/L    Chloride 103 98 - 107 mmol/L    CO2 25.0 22.0 - 29.0 mmol/L    Calcium 8.4 (L) 8.6 - 10.5 mg/dL    BUN/Creatinine Ratio 10.7 7.0 - 25.0    Anion Gap 13.0 5.0 - 15.0 mmol/L    eGFR 44.3 (L) >60.0 mL/min/1.73   CBC Auto Differential    Specimen: Blood   Result Value Ref Range    WBC 8.80 3.40 - 10.80 10*3/mm3    RBC 3.53 (L) 3.77 - 5.28 10*6/mm3    Hemoglobin 10.7 (L) 12.0 - 15.9 g/dL    Hematocrit 32.2 (L) 34.0 - 46.6 %    MCV 91.1 79.0 - 97.0 fL    MCH 30.3 26.6 - 33.0 pg    MCHC 33.3 31.5 - 35.7 g/dL    RDW 15.5 (H) 12.3 - 15.4 %    RDW-SD 49.9 37.0 - 54.0 fl    MPV 7.4 6.0 - 12.0 fL    Platelets 251 140 - 450 10*3/mm3    Neutrophil % 75.3 42.7 - 76.0 %    Lymphocyte % 11.2 (L) 19.6 - 45.3 %    Monocyte % 8.0 5.0 - 12.0 %    Eosinophil % 4.6 0.3 - 6.2 %    Basophil % 0.9 0.0 - 1.5 %    Neutrophils, Absolute 6.70 1.70 - 7.00 10*3/mm3    Lymphocytes, Absolute 1.00 0.70 - 3.10 10*3/mm3    Monocytes, Absolute 0.70 0.10 - 0.90 10*3/mm3    Eosinophils, Absolute 0.40 0.00 - 0.40 10*3/mm3    Basophils, Absolute 0.10 0.00 - 0.20 10*3/mm3    nRBC 0.1 0.0 - 0.2 /100 WBC   POC Glucose Once    Specimen: Blood   Result Value Ref Range    Glucose 138 (H) 70 - 105 mg/dL   POC Glucose Once    Specimen: Blood   Result Value Ref Range    Glucose 124 (H) 70 - 105 mg/dL   POC Glucose Once    Specimen: Blood   Result Value Ref Range    Glucose 143 (H) 70 - 105 mg/dL   POC Glucose Once    Specimen: Blood   Result Value Ref Range    Glucose 139 (H)  70 - 105 mg/dL   POC Glucose Once    Specimen: Blood   Result Value Ref Range    Glucose 124 (H) 70 - 105 mg/dL   POC Glucose Once    Specimen: Blood   Result Value Ref Range    Glucose 122 (H) 70 - 105 mg/dL   POC Glucose Once    Specimen: Blood   Result Value Ref Range    Glucose 103 70 - 105 mg/dL   POC Glucose Once    Specimen: Blood   Result Value Ref Range    Glucose 91 70 - 105 mg/dL   POC Glucose Once    Specimen: Blood   Result Value Ref Range    Glucose 122 (H) 70 - 105 mg/dL   ECG 12 Lead   Result Value Ref Range    QT Interval 365 ms     No radiology results for the last day                                          MDM  I reviewed the CT report and images, the CT report from January of last year had a similar appearance and report.  She did have some vomiting here in the emergency room and I suspect the findings on CT are contributing to her emesis.  NG tube was ordered for decompression and to hopefully reduce aspiration risk.  Patient does have a DNR status.  She does have an apparent urinary tract infection as well based on laboratory review.  She was ordered IV Rocephin.  Hospitalist service was paged for admission and consideration of GI consultation  Final diagnoses:   Nausea and vomiting, unspecified vomiting type   Hiatal hernia   Urinary tract infection without hematuria, site unspecified       ED Disposition  ED Disposition     ED Disposition   Decision to Admit    Condition   --    Comment   Level of Care: Med/Surg [1]   Diagnosis: Nausea and vomiting, unspecified vomiting type [[id number]]   Admitting Physician: JAVIER SMITH [[id number]]   Bed Request Comments: cardiac monitor   Certification: I Certify That Inpatient Hospital Services Are Medically Necessary For Greater Than 2 Midnights               No follow-up provider specified.       Medication List      No changes were made to your prescriptions during this visit.          Dipesh Vizcarra MD  05/08/22 0752

## 2022-05-06 NOTE — PLAN OF CARE
Goal Outcome Evaluation:  Plan of Care Reviewed With: patient        Progress: no change  Outcome Evaluation: Patient without complaints of pain. Pleasantly confused. EGD completed this morning.  Remains strict NPO. NG to left nare secured at 50cm. NG to LWIS.  Vital signs stable. Call light within reach. Care plan on going.

## 2022-05-06 NOTE — CONSULTS
GENERAL SURGERY CONSULT    Referring Provider: Caryn  Reason for Consultation: hiatal hernia    Patient Care Team:  Giovani Mejia MD as PCP - General (Geriatric Medicine)    Chief complaint abdominal pain, vomiting    Subjective .     History of present illness: 82-year-old lady admitted to the hospital for apparent persistent vomiting.  Patient unable to a significant history of an appetite but is reading a chart.  She has known large hiatal hernia.  She had NG tube placed at the time of admission.  CT was performed showing hiatal hernia with majority of stomach in the chest.  Dr. Rubin performed an EGD earlier today I was present during examination.  Patient did not appear to have viable stomach with hiatal hernia with no significant obstruction of the stomach at that time.  She is currently resting comfortably in her room.    She has a significant history of dementia, Parkinson's, stroke now on aspirin and Plavix, COPD, diabetes.    Review of Systems    Review of Systems   Unable to perform ROS: Dementia         History  Past Medical History:   Diagnosis Date   • Anemia    • Atherosclerotic heart disease of native coronary artery without angina pectoris    • COPD (chronic obstructive pulmonary disease) (HCC)    • COVID-19 03/2020   • Dementia (HCC)    • Diabetes mellitus (HCC)    • GERD (gastroesophageal reflux disease)    • Gout    • Hyperlipidemia    • Hypertension    • Localized edema    • Parkinson's disease (HCC)    • Vitamin D deficiency    ,   Past Surgical History:   Procedure Laterality Date   • ENDOSCOPY N/A 1/2/2021    Procedure: ESOPHAGOGASTRODUODENOSCOPY;  Surgeon: Yoan Mendoza MD;  Location: [facility];  Service: Gastroenterology;  Laterality: N/A;  post: esophageal trauma from nasogastric tube, large hiatal hernia   ,   Family History   Family history unknown: Yes   ,   Social History     Tobacco Use   • Smoking status: Never Smoker   • Smokeless tobacco: Never Used   Substance  Use Topics   • Alcohol use: Never   • Drug use: Never   ,   Medications Prior to Admission   Medication Sig Dispense Refill Last Dose   • acetaminophen (TYLENOL) 325 MG tablet Take 650 mg by mouth Every 6 (Six) Hours As Needed for Mild Pain  or Fever.      • albuterol (PROVENTIL) (2.5 MG/3ML) 0.083% nebulizer solution Take 2.5 mg by nebulization Every 4 (Four) Hours As Needed for Wheezing. 30 mL 12    • allopurinol (ZYLOPRIM) 100 MG tablet Take 200 mg by mouth Daily.   5/5/2022 at Unknown time   • aspirin 81 MG EC tablet Take 81 mg by mouth Daily.   5/5/2022 at Unknown time   • budesonide-formoterol (Symbicort) 160-4.5 MCG/ACT inhaler Inhale 2 puffs 2 (Two) Times a Day. 1 each 12 5/5/2022 at Unknown time   • carbidopa-levodopa (SINEMET)  MG per tablet Take 1 tablet by mouth 3 (Three) Times a Day.   5/5/2022 at Unknown time   • clopidogrel (PLAVIX) 75 MG tablet Take 75 mg by mouth Daily.   5/5/2022 at Unknown time   • furosemide (LASIX) 20 MG tablet Take 1 tablet by mouth 2 (Two) Times a Day. q day x 7 days, check bmp in one week. Then increase to BID 30 tablet 0 5/5/2022 at Unknown time   • insulin glargine (LANTUS, SEMGLEE) 100 UNIT/ML injection Inject 20 Units under the skin into the appropriate area as directed Daily. 10 mL 12 5/5/2022 at Unknown time   • magnesium hydroxide (MILK OF MAGNESIA) 400 MG/5ML suspension Take 30 mL by mouth Daily As Needed for Constipation.      • metoprolol tartrate (LOPRESSOR) 50 MG tablet Take 50 mg by mouth 2 (Two) Times a Day.   5/6/2022 at Unknown time   • nitroglycerin (NITROSTAT) 0.4 MG SL tablet Place 1 tablet under the tongue Every 5 (Five) Minutes As Needed for Chest Pain (Only if SBP Greater Than 100). Take no more than 3 doses in 15 minutes. 30 tablet 12    • nystatin (MYCOSTATIN) 185556 UNIT/GM powder Apply  topically to the appropriate area as directed 3 (Three) Times a Day. Under breasts/ abd folds   5/5/2022 at Unknown time   • Omega-3 Fatty Acids (fish oil) 1000  MG capsule capsule Take 1,000 mg by mouth Daily With Breakfast.   5/5/2022 at Unknown time   • pantoprazole (PROTONIX) 40 MG EC tablet Take 1 tablet by mouth Daily.   5/5/2022 at Unknown time   • selenium sulfide (SELSUN) 1 % lotion Apply  topically to the appropriate area as directed Every 7 (Seven) Days. Tuesday      , Scheduled Meds:  allopurinol, 100 mg, Nasogastric, Daily  aspirin, 81 mg, Nasogastric, Daily  budesonide-formoterol, 2 puff, Inhalation, BID - RT  carbidopa-levodopa, 1 tablet, Nasogastric, TID  cefTRIAXone, 1 g, Intravenous, Q24H  clopidogrel, 75 mg, Nasogastric, Daily  insulin lispro, 0-9 Units, Subcutaneous, Q6H  metoprolol tartrate, 50 mg, Nasogastric, BID  nystatin, , Topical, TID  pantoprazole, 40 mg, Intravenous, Q12H  sodium chloride, 3 mL, Intravenous, Q12H    , Continuous Infusions:  sodium chloride, 125 mL/hr, Last Rate: 125 mL/hr (05/06/22 1311)    , PRN Meds:  •  acetaminophen  •  albuterol  •  dextrose  •  dextrose  •  glucagon (human recombinant)  •  insulin lispro **AND** insulin lispro  •  ipratropium-albuterol  •  magnesium hydroxide  •  nitroglycerin  •  ondansetron **OR** ondansetron  •  [COMPLETED] Insert peripheral IV **AND** sodium chloride  •  [COMPLETED] Insert peripheral IV **AND** sodium chloride  •  sodium chloride and Allergies:  Codeine, Latex, and Namenda [memantine]    Objective     Vital Signs   Temp:  [97 °F (36.1 °C)-98.7 °F (37.1 °C)] 98.1 °F (36.7 °C)  Heart Rate:  [62-88] 88  Resp:  [15-22] 16  BP: (109-169)/(40-83) 154/83    Physical Exam:       General Appearance:    Alert, cooperative, in no acute distress   Head:    Normocephalic, without obvious abnormality, atraumatic   Eyes:            Lids and lashes normal, conjunctivae and sclerae normal, no   icterus, no pallor, corneas clear   Lungs:     Breathing unlabored   Chest Wall:    No abnormalities observed   Abdomen:     Soft, nondistended   Extremities:   Moves all extremities       Results Review:  Lab  Results (last 72 hours)     Procedure Component Value Units Date/Time    POC Glucose Once [[id number]]  (Abnormal) Collected: 05/06/22 1637    Specimen: Blood Updated: 05/06/22 1638     Glucose 139 mg/dL      Comment: Serial Number: [id number]:  [id number]       Urine Culture - Urine, Urine, Catheter [[id number]]  (Abnormal) Collected: 05/05/22 2224    Specimen: Urine, Catheter Updated: 05/06/22 1311     Urine Culture >100,000 CFU/mL Gram Negative Bacilli    POC Glucose Once [[id number]]  (Abnormal) Collected: 05/06/22 1142    Specimen: Blood Updated: 05/06/22 1143     Glucose 143 mg/dL      Comment: Serial Number: [id number]:  [id number]       Hemoglobin A1c [[id number]]  (Abnormal) Collected: 05/06/22 0315    Specimen: Blood Updated: 05/06/22 1007     Hemoglobin A1C 7.5 %     Narrative:      Hemoglobin A1C Reference Range:    <5.7 %        Normal  5.7-6.4 %     Increased risk for diabetes  > 6.4 %        Diabetes       These guidelines have been recommended by the American Diabetic Association for Hgb A1c.      The following 2010 guidelines have been recommended by the American Diabetes Association for Hemoglobin A1c.    HBA1c 5.7-6.4% Increased risk for future diabetes (pre-diabetes)  HBA1c     >6.4% Diabetes      POC Glucose Once [[id number]]  (Abnormal) Collected: 05/06/22 0923    Specimen: Blood Updated: 05/06/22 0924     Glucose 124 mg/dL      Comment: Serial Number: [id number]:  [id number]       POC Glucose Once [[id number]]  (Abnormal) Collected: 05/06/22 0707    Specimen: Blood Updated: 05/06/22 0708     Glucose 138 mg/dL      Comment: Serial Number: [id number]:  [id number]       Basic Metabolic Panel [[id number]]  (Abnormal) Collected: 05/06/22 0315    Specimen: Blood Updated: 05/06/22 0350     Glucose 195 mg/dL      BUN 21 mg/dL      Creatinine 1.34 mg/dL      Sodium 142 mmol/L      Potassium 4.0 mmol/L      Chloride 98 mmol/L      CO2 31.0 mmol/L      Calcium 9.3 mg/dL       BUN/Creatinine Ratio 15.7     Anion Gap 13.0 mmol/L      eGFR 39.2 mL/min/1.73      Comment: National Kidney Foundation and American Society of Nephrology (ASN) Task Force recommended calculation based on the Chronic Kidney Disease Epidemiology Collaboration (CKD-EPI) equation refit without adjustment for race.       Narrative:      GFR Normal >60  Chronic Kidney Disease <60  Kidney Failure <15      CBC & Differential [[id number]]  (Abnormal) Collected: 05/06/22 0315    Specimen: Blood Updated: 05/06/22 0322    Narrative:      The following orders were created for panel order CBC & Differential.  Procedure                               Abnormality         Status                     ---------                               -----------         ------                     CBC Auto Differential[[id number]]        Abnormal            Final result                 Please view results for these tests on the individual orders.    CBC Auto Differential [[id number]]  (Abnormal) Collected: 05/06/22 0315    Specimen: Blood Updated: 05/06/22 0322     WBC 10.80 10*3/mm3      RBC 4.16 10*6/mm3      Hemoglobin 12.5 g/dL      Hematocrit 37.6 %      MCV 90.3 fL      MCH 30.0 pg      MCHC 33.3 g/dL      RDW 15.3 %      RDW-SD 48.6 fl      MPV 7.5 fL      Platelets 299 10*3/mm3      Neutrophil % 77.7 %      Lymphocyte % 13.6 %      Monocyte % 6.1 %      Eosinophil % 1.4 %      Basophil % 1.2 %      Neutrophils, Absolute 8.40 10*3/mm3      Lymphocytes, Absolute 1.50 10*3/mm3      Monocytes, Absolute 0.70 10*3/mm3      Eosinophils, Absolute 0.10 10*3/mm3      Basophils, Absolute 0.10 10*3/mm3      nRBC 0.0 /100 WBC     COVID PRE-OP / PRE-PROCEDURE SCREENING ORDER (NO ISOLATION) - Swab, Nasopharynx [[id number]]  (Normal) Collected: 05/05/22 2340    Specimen: Swab from Nasopharynx Updated: 05/06/22 0011    Narrative:      The following orders were created for panel order COVID PRE-OP / PRE-PROCEDURE SCREENING ORDER (NO ISOLATION) - Swab,  Nasopharynx.  Procedure                               Abnormality         Status                     ---------                               -----------         ------                     COVID-19,CEPHEID/JOSE,CO...[[id number]]  Normal              Final result                 Please view results for these tests on the individual orders.    COVID-19,CEPHEID/JOSE,COR/AGUEDA/PAD/LAURIE IN-HOUSE(OR EMERGENT/ADD-ON),NP SWAB IN TRANSPORT MEDIA 3-4 HR TAT, RT-PCR - Swab, Nasopharynx [[id number]]  (Normal) Collected: 05/05/22 2340    Specimen: Swab from Nasopharynx Updated: 05/06/22 0011     COVID19 Not Detected    Narrative:      Fact sheet for providers: https://www.fda.gov/media/211132/download     Fact sheet for patients: https://www.fda.gov/media/409462/download  Fact sheet for providers: https://www.fda.gov/media/656997/download    Fact sheet for patients: https://www.fda.gov/media/419035/download    Test performed by PCR.    Urinalysis, Microscopic Only - Urine, Catheter [[id number]]  (Abnormal) Collected: 05/05/22 2224    Specimen: Urine, Catheter Updated: 05/05/22 2254     RBC, UA 0-2 /HPF      WBC, UA 6-12 /HPF      Bacteria, UA 4+ /HPF      Squamous Epithelial Cells, UA 0-2 /HPF      Hyaline Casts, UA None Seen /LPF      Methodology Manual Light Microscopy    Comprehensive Metabolic Panel [[id number]]  (Abnormal) Collected: 05/05/22 2201    Specimen: Blood Updated: 05/05/22 2244     Glucose 166 mg/dL      BUN 22 mg/dL      Creatinine 1.35 mg/dL      Sodium 140 mmol/L      Potassium 3.7 mmol/L      Chloride 96 mmol/L      CO2 32.0 mmol/L      Calcium 9.8 mg/dL      Total Protein 7.2 g/dL      Albumin 3.60 g/dL      ALT (SGPT) <5 U/L      AST (SGOT) 10 U/L      Alkaline Phosphatase 101 U/L      Total Bilirubin 0.4 mg/dL      Globulin 3.6 gm/dL      A/G Ratio 1.0 g/dL      BUN/Creatinine Ratio 16.3     Anion Gap 12.0 mmol/L      eGFR 39.1 mL/min/1.73      Comment: National Kidney Foundation and American Society of Nephrology  (ASN) Task Force recommended calculation based on the Chronic Kidney Disease Epidemiology Collaboration (CKD-EPI) equation refit without adjustment for race.       Narrative:      GFR Normal >60  Chronic Kidney Disease <60  Kidney Failure <15      Urinalysis With Culture If Indicated - Urine, Catheter [[id number]]  (Abnormal) Collected: 05/05/22 2224    Specimen: Urine, Catheter Updated: 05/05/22 2240     Color, UA Yellow     Appearance, UA Cloudy     pH, UA 7.0     Specific Gravity, UA 1.011     Glucose, UA Negative     Ketones, UA Negative     Bilirubin, UA Negative     Blood, UA Negative     Protein, UA Negative     Leuk Esterase, UA Moderate (2+)     Nitrite, UA Negative     Urobilinogen, UA 1.0 E.U./dL    Lipase [[id number]]  (Abnormal) Collected: 05/05/22 2201    Specimen: Blood Updated: 05/05/22 2227     Lipase 12 U/L     CBC & Differential [[id number]]  (Abnormal) Collected: 05/05/22 2201    Specimen: Blood Updated: 05/05/22 2211    Narrative:      The following orders were created for panel order CBC & Differential.  Procedure                               Abnormality         Status                     ---------                               -----------         ------                     CBC Auto Differential[[id number]]        Abnormal            Final result                 Please view results for these tests on the individual orders.    CBC Auto Differential [[id number]]  (Abnormal) Collected: 05/05/22 2201    Specimen: Blood Updated: 05/05/22 2211     WBC 12.60 10*3/mm3      RBC 4.59 10*6/mm3      Hemoglobin 13.2 g/dL      Hematocrit 41.4 %      MCV 90.1 fL      MCH 28.9 pg      MCHC 32.0 g/dL      RDW 15.3 %      RDW-SD 48.1 fl      MPV 7.6 fL      Platelets 307 10*3/mm3      Neutrophil % 81.9 %      Lymphocyte % 9.5 %      Monocyte % 6.0 %      Eosinophil % 2.2 %      Basophil % 0.4 %      Neutrophils, Absolute 10.30 10*3/mm3      Lymphocytes, Absolute 1.20 10*3/mm3      Monocytes, Absolute 0.80 10*3/mm3       Eosinophils, Absolute 0.30 10*3/mm3      Basophils, Absolute 0.00 10*3/mm3      nRBC 0.0 /100 WBC         Imaging Results (Last 72 Hours)     Procedure Component Value Units Date/Time    CT Abdomen Pelvis Without Contrast [[id number]] Collected: 05/05/22 2241     Updated: 05/05/22 2259    Narrative:      CT ABDOMEN PELVIS WO CONTRAST-     Date of Exam: 5/5/2022 10:36 PM     Indication: pain vomiting  history of hiatal hernia     Comparison: CT abdomen pelvis 01/02/2021     Technique: Contiguous axial CT images were obtained from the lung bases  to the pubic symphysis without contrast. Sagittal and coronal  reconstructions were performed.  Automated exposure control and  iterative reconstruction methods were used.     FINDINGS:     Lower Thorax: Significant thickening of the distal esophagus. There is a  large hernia with stomach partially above the diaphragm. There is  distended fundus which is excluded from the hernia. The gastroesophageal  junction is seen at the diaphragmatic hiatus. There are peripheral  septal lines in the lung bases consistent with chronic lung disease.     Peritoneum: No free air or free fluid.      Appendix: Appendix is well seen and is normal.     Kidneys, ureters, and urinary bladder: No hydronephrosis.  No  nephroureterolithiasis. No focal renal lesions. Urinary bladder wall is  diffusely thickened. There is a small amount of gas within urinary  bladder which could be seen with recent instrumentation.     Liver, gallbladder, and bile ducts: No focal hepatic lesions.  Gallbladder and bile ducts are unremarkable.     Spleen: Spleen is normal size.  No focal splenic lesions.     Adrenal glands: Unremarkable.     Pancreas: No focal masses.  No pancreatic duct dilation.     Abdominal aorta and vascular structures: No aneurysmal dilation. Severe  atherosclerotic disease.     Stomach and Bowel: Distended stomach with large hiatal hernia with the  gastric body and antrum above the diaphragm  and dilation of the fundus  and antrum. Significant wall thickening of the distal esophagus. There  are diverticuli scattered in the colon. There is some mild stranding and  fat at the descending colon mid location consistent with mild corticated  diverticulitis..      Reproductive Organs: Status post hysterectomy     Lymph nodes: No pathologically enlarged lymph nodes.     Soft tissues: Unremarkable.     Osseous structures: No aggressive focal lytic or sclerotic osseous  lesions. Osteopenia. Status post compression fracture and augmentation  of the L3 level. Remote compression deformity of T12.     Evaluation of bowel and solid organs is limited without contrast  administration.       Impression:      1.There is a large hiatal hernia with the gastric body and antrum above  the diaphragm. There is significant wall thickening of the esophagus.  The gastroesophageal junction is at the diaphragmatic hiatus and there  is distention of the excluded fundus. Obstruction at the hernia site is  suspected.  2.No free air or free fluid.  3.Mildly comminuted diverticulitis in the descending colon.  4.Status post hysterectomy.           Electronically Signed By-Mini Bower MD On:5/5/2022 10:47 PM  This report was finalized on [id number] by  Mini Bower MD.          I reviewed the patient's new clinical results.  I reviewed the patient's new imaging results and agree with the interpretation.  I reviewed the patient's other test results and agree with the interpretation      Assessment/Plan       Hiatal hernia    Type 2 diabetes mellitus without complication, with long-term current use of insulin ([facility])    Hyperlipidemia    GERD (gastroesophageal reflux disease)    Hypertension    Dementia ([facility])    Atherosclerotic heart disease of native coronary artery without angina pectoris    Gout    Parkinson's disease ([facility])    CKD (chronic kidney disease) stage 4, GFR 15-29 ml/min ([facility])    Nausea and vomiting, unspecified vomiting type     Acute UTI (urinary tract infection)    COPD (chronic obstructive pulmonary disease) (HCC)      82-year-old lady with a large hiatal hernia and recent nausea vomiting likely related to the hiatal hernia.  No evidence of gastric volvulus or ischemia of the stomach currently.    Reportedly, the patient is unable to have a conversation about the current issues that she is facing.  No family present currently we discussed this with either.  Given her presentation she may benefit from hernia repair however it appears that she be a fairly poor operative candidate given her multiple medical comorbidities.  Additionally she is on Aspirin and Plavix until the time of admission.  If she is feeling well tomorrow we likely can remove her NG tube allow and liquid diet.    Only discussed with her family were not able to proceed with hiatal hernia repair.  Please hold her Plavix in case surgery is needed.    Treat UTI per primary.    Would need cardiac evaluation prior to surgery.    I discussed the patient's findings and my recommendations with patient and consulting provider              This document has been electronically signed by Sameer Lai MD on May 6, 2022 17:39 EDT      Sameer Lai MD  05/06/22  17:39 EDT

## 2022-05-06 NOTE — CONSULTS
"GI CONSULT  NOTE:    Referring Provider: Myah Haddad NP    Chief complaint: Nausea and vomiting    Subjective \" I have a terrible headache.\"    History of present illness: Nory Duff is a 82 y.o. female with a history of Parkinson's, dementia, CVA on aspirin and Plavix, COPD, CKD, diabetes, and YUKO who presents from [facility] for reports of of nausea and vomiting.  History is extremely limited secondary to dementia.  Therefore, history has been supplemented via chart review and bedside RN.  Patient has NG tube to suction with approximately 200 cc of dark output.  Reports of nausea and vomiting last night.  No further nausea and vomiting this morning.  Patient denies abdominal pain.  She does report a headache.  She has not had a bowel movement in 2 days.  States she typically has a bowel movement daily.  Her weight has been stable.  She has been afebrile.      Endo History:  2014 EGD (Dr. Rubin) - moderate hiatal hernia, minimal prepyloric inflammation  5/2012 EGD-moderate hiatal hernia, reflux changes  8/2011 Colonoscopy-diverticulosis    Past Medical History:  Past Medical History:   Diagnosis Date   • Anemia    • Atherosclerotic heart disease of native coronary artery without angina pectoris    • COPD (chronic obstructive pulmonary disease) (HCC)    • COVID-19 03/2020   • Dementia (HCC)    • Diabetes mellitus (HCC)    • GERD (gastroesophageal reflux disease)    • Gout    • Hyperlipidemia    • Hypertension    • Localized edema    • Parkinson's disease (HCC)    • Vitamin D deficiency        Past Surgical History:  Past Surgical History:   Procedure Laterality Date   • ENDOSCOPY N/A 1/2/2021    Procedure: ESOPHAGOGASTRODUODENOSCOPY;  Surgeon: Yoan Mendoza MD;  Location: [facility] ENDOSCOPY;  Service: Gastroenterology;  Laterality: N/A;  post: esophageal trauma from nasogastric tube, large hiatal hernia       Social History:  Social History     Tobacco Use   • Smoking status: Never Smoker   • " Smokeless tobacco: Never Used   Substance Use Topics   • Alcohol use: Never   • Drug use: Never       Family History:  Family History   Family history unknown: Yes       Medications:  Medications Prior to Admission   Medication Sig Dispense Refill Last Dose   • allopurinol (ZYLOPRIM) 100 MG tablet Take 200 mg by mouth Daily.   5/5/2022 at Unknown time   • aspirin 81 MG EC tablet Take 81 mg by mouth Daily.   5/5/2022 at Unknown time   • budesonide-formoterol (Symbicort) 160-4.5 MCG/ACT inhaler Inhale 2 puffs 2 (Two) Times a Day. 1 each 12 5/5/2022 at Unknown time   • carbidopa-levodopa (SINEMET)  MG per tablet Take 1 tablet by mouth 3 (Three) Times a Day.   5/5/2022 at Unknown time   • clopidogrel (PLAVIX) 75 MG tablet Take 75 mg by mouth Daily.   5/5/2022 at Unknown time   • furosemide (LASIX) 20 MG tablet Take 1 tablet by mouth 2 (Two) Times a Day. q day x 7 days, check bmp in one week. Then increase to BID 30 tablet 0 5/5/2022 at Unknown time   • insulin glargine (LANTUS, SEMGLEE) 100 UNIT/ML injection Inject 20 Units under the skin into the appropriate area as directed Daily. 10 mL 12 5/5/2022 at Unknown time   • metoprolol tartrate (LOPRESSOR) 50 MG tablet Take 50 mg by mouth 2 (Two) Times a Day.   5/6/2022 at Unknown time   • nystatin (MYCOSTATIN) 554486 UNIT/GM powder Apply  topically to the appropriate area as directed 3 (Three) Times a Day. Under breasts/ abd folds   5/5/2022 at Unknown time   • Omega-3 Fatty Acids (fish oil) 1000 MG capsule capsule Take 1,000 mg by mouth Daily With Breakfast.   5/5/2022 at Unknown time   • pantoprazole (PROTONIX) 40 MG EC tablet Take 1 tablet by mouth Daily.   5/5/2022 at Unknown time   • predniSONE (DELTASONE) 20 MG tablet Take 2 tablets by mouth Daily With Breakfast. 40 mg p.o. daily x3 days, 30 mg p.o. daily x3 days, 20 mg p.o. daily x3 days, 10 mg p.o. daily x3 days, 30 tablet 0 5/5/2022 at Unknown time   • acetaminophen (TYLENOL) 325 MG tablet Take 650 mg by  mouth Every 6 (Six) Hours As Needed for Mild Pain  or Fever.   Unknown at Unknown time   • albuterol (PROVENTIL) (2.5 MG/3ML) 0.083% nebulizer solution Take 2.5 mg by nebulization Every 4 (Four) Hours As Needed for Wheezing. 30 mL 12 Unknown at Unknown time   • ipratropium-albuterol (DUO-NEB) 0.5-2.5 mg/3 ml nebulizer Take 3 mL by nebulization 4 (Four) Times a Day. 360 mL 4    • magnesium hydroxide (MILK OF MAGNESIA) 400 MG/5ML suspension Take 30 mL by mouth Daily As Needed for Constipation.   Unknown at Unknown time   • nitroglycerin (NITROSTAT) 0.4 MG SL tablet Place 1 tablet under the tongue Every 5 (Five) Minutes As Needed for Chest Pain (Only if SBP Greater Than 100). Take no more than 3 doses in 15 minutes. 30 tablet 12 Unknown at Unknown time   • selenium sulfide (SELSUN) 1 % lotion Apply  topically to the appropriate area as directed Every 7 (Seven) Days. Tuesday   Unknown at Unknown time       Scheduled Meds:cefTRIAXone, 1 g, Intravenous, Q24H  insulin lispro, 0-14 Units, Subcutaneous, TID AC  pantoprazole, 40 mg, Intravenous, Q AM  sodium chloride, 3 mL, Intravenous, Q12H      Continuous Infusions:sodium chloride, 100 mL/hr, Last Rate: 100 mL/hr (05/06/22 0044)      PRN Meds:.dextrose  •  dextrose  •  glucagon (human recombinant)  •  insulin lispro **AND** insulin lispro  •  ipratropium-albuterol  •  nitroglycerin  •  ondansetron **OR** ondansetron  •  [COMPLETED] Insert peripheral IV **AND** sodium chloride  •  [COMPLETED] Insert peripheral IV **AND** sodium chloride  •  sodium chloride    ALLERGIES:  Codeine, Latex, and Namenda [memantine]    ROS:  The following systems were reviewed   Constitution:  No fevers, chills, no unintentional weight loss, +headache  Skin: no rash, no jaundice  Eyes:  No blurry vision, no eye pain  HENT:  No change in hearing or smell  Resp:  No dyspnea or cough  CV:  No chest pain or palpitations  :  No dysuria, hematuria  Musculoskeletal:  No leg cramps or  "arthralgias  Neuro:  No tremor, no numbness  Psych:  No depression or confusion    Objective Resting in bed, confused, no family at bedside    Vital Signs:   Vitals:    05/06/22 0445 05/06/22 0700 05/06/22 0835 05/06/22 0900   BP: 124/51  122/48 153/48   BP Location: Left arm  Left arm    Patient Position: Lying  Lying    Pulse: 70  69 68   Resp: 17  16 16   Temp: 98 °F (36.7 °C)  97.6 °F (36.4 °C) 97.4 °F (36.3 °C)   TempSrc: Oral  Axillary Oral   SpO2: 97%  98% 96%   Weight:  80.3 kg (177 lb)  80.3 kg (177 lb)   Height:    160 cm (62.99\")       Physical Exam:       General Appearance:    Awake and alert, in no acute distress, confused, NG tube to suction with 200 cc dark output   Head:    Normocephalic, without obvious abnormality, atraumatic   Throat:   No oral lesions, no thrush, oral mucosa moist   Lungs:     Respirations regular, even and unlabored   Chest Wall:    No abnormalities observed   Abdomen:     Soft, non-tender, no rebound or guarding, non-distended   Rectal:     Deferred   Extremities:   Moves all extremities, no edema, no cyanosis   Pulses:   Pulses palpable and equal bilaterally   Skin:   No rash, no jaundice, normal palpation               Results Review:   I reviewed the patient's labs and imaging.  CBC    Results from last 7 days   Lab Units 05/06/22  0315 05/05/22  2201   WBC 10*3/mm3 10.80 12.60*   HEMOGLOBIN g/dL 12.5 13.2   PLATELETS 10*3/mm3 299 307     CMP   Results from last 7 days   Lab Units 05/06/22  0315 05/05/22  2201   SODIUM mmol/L 142 140   POTASSIUM mmol/L 4.0 3.7   CHLORIDE mmol/L 98 96*   CO2 mmol/L 31.0* 32.0*   BUN mg/dL 21 22   CREATININE mg/dL 1.34* 1.35*   GLUCOSE mg/dL 195* 166*   ALBUMIN g/dL  --  3.60   BILIRUBIN mg/dL  --  0.4   ALK PHOS U/L  --  101   AST (SGOT) U/L  --  10   ALT (SGPT) U/L  --  <5   LIPASE U/L  --  12*     Cr Clearance Estimated Creatinine Clearance: 31.4 mL/min (A) (by C-G formula based on SCr of 1.34 mg/dL (H)).  Coag     HbA1C   Lab Results "   Component Value Date    HGBA1C 7.4 (H) 12/14/2021    HGBA1C 6.4 (H) 01/01/2021     Blood Glucose   Glucose   Date/Time Value Ref Range Status   05/06/2022 0923 124 (H) 70 - 105 mg/dL Final     Comment:     Serial Number: [id number]:  [id number]   05/06/2022 0707 138 (H) 70 - 105 mg/dL Final     Comment:     Serial Number: [id number]:  [id number]     Infection     UA    Results from last 7 days   Lab Units 05/05/22  2224   NITRITE UA  Negative   WBC UA /HPF 6-12*   BACTERIA UA /HPF 4+*   SQUAM EPITHEL UA /HPF 0-2     Radiology(recent) CT Abdomen Pelvis Without Contrast    Result Date: 5/5/2022  1.There is a large hiatal hernia with the gastric body and antrum above the diaphragm. There is significant wall thickening of the esophagus. The gastroesophageal junction is at the diaphragmatic hiatus and there is distention of the excluded fundus. Obstruction at the hernia site is suspected. 2.No free air or free fluid. 3.Mildly comminuted diverticulitis in the descending colon. 4.Status post hysterectomy.    Electronically Signed By-Mini Bower MD On:5/5/2022 10:47 PM This report was finalized on [id number] by  Mini Bower MD.         ASSESSMENT:  Nausea with vomiting  Abnormal CT of stomach and esophagus  UTI  IZABELLA on CKD  Dementia/Parkinson's  History of CVA on aspirin and Plavix  COPD  Diabetes  Hypertension  History of iron deficiency anemia    PLAN:  Patient is an 82-year-old female who presented from nursing home with reports of nausea vomiting.  History is extremely limited secondary to dementia.  CT of abdomen/pelvis shows large hiatal hernia with gastric body and antrum above diaphragm, significant thickening of the esophagus, concern for obstruction at hernia site.  Patient has NG tube to suction with 200 cc of dark output.  Denies further nausea, vomiting, abdominal pain today.  We will proceed with EGD to further evaluate hiatal hernia and concern for obstruction on CT scan.  Maintain NPO.   General surgery has also been consulted regarding large hiatal hernia/obstruction.  Continue PPI.  Further recommendations to follow EGD results.  Supportive care.      I discussed the patients findings and my recommendations with the patient.    We appreciate the referral.    Electronically signed by MAE Benitez, 05/06/22, 9:53 AM EDT.

## 2022-05-06 NOTE — DISCHARGE INSTRUCTIONS
A responsible adult should stay with you and you should rest quietly for the rest of the day.    Do not drink alcohol, drive operate any heavy machinery or power tools or make any legal/important decisions for the next 24 hours.    Progress your diet as tolerated.  If you begin to experience severe pain, increased shortness of breath, racing heartbeat or a fever above 101F, seek immediate medical attention.     Follow up with MD as instructed.  Call office for results in 3 to 5 days if needed.      Description of Procedure:  Informed consent was obtained from the patient.  They were placed in the left lateral decubitus position and IV conscious sedation was administered by anesthesia while being monitored continuously throughout the procedure.  The video Olympus endoscope was introduced in the esophagus and was advanced under direct vision into the stomach.  Dr. Lai was in attendance.  There is a large complex hiatal hernia with what looks to be a second area of diaphragmatic herniation through which the stomach body protrudes.  I was able to eventually find my way out into the antrum through the pylorus and into the second portion of the duodenum which is normal the bulb was normal the pylorus is patent the antrum and stomach body were thoroughly inspected including retroflexion views.  The majority of the stomach body if not the entire stomach body appears to be above the diaphragmatic hiatus.  The fundus and cardia also appeared to be above the diaphragmatic hiatus however, CT suggested the fundus remained in the abdomen with the stomach body and antrum in the chest.  There are no gastric ulcers or erosions or varices.  The squamocolumnar line is at the GE junction there is a moderate amount of erosive esophagitis extending about a third of the way up the esophagus likely from her protracted vomiting remaining esophagus was normal.     Next an 18-gauge NG tube with a 2-0 Vicryl suture put through the end of  it was passed into the esophageal inlet under direct endoscopic observation and it was carried down into the stomach where I grasped the suture and was able, with moderate to significant difficulty, to maneuver it to the junction of the stomach antrum and body where the suture was clipped to the antral wall to prevent the NG tube from being pulled out on withdrawing the scope.  The scope was withdrawn and the NG remained in good position.  She tolerated the procedure well returned recovery in good condition.     Impression:  Large complex hiatal hernia causing volvulus type physiology with dilation and presumptive obstruction of the fundus and vomiting  GERD     Recommendations:  Twice daily PPI IV  IV fluid  NG to low intermittent wall suction  I suspect surgery is going to have to repair this although a barium study could be performed in a day or 2 through the NG tube after she has been decompressed to see what the residual anatomy looks like  We appreciate the referral and will see as needed.        Saleem Rubin MD

## 2022-05-06 NOTE — H&P
"    [facility] Medicine Services      Patient Name: Nory Duff  : 1940  MRN: [id number]  Primary Care Physician:  Giovani Mejia MD  Date of admission: 2022      Subjective      Chief Complaint: reported nausea and vomiting     History of Present Illness: Nory Duff is a 82 y.o. female who presented to [facility] on 2022 from an extended care facility for reports of nausea and vomiting 3 times today. She is awake and alert and answers appropriately but a poor historian for details. When asked why she was sent here she replied\" that's what I would like to know\". She denies any abdominal pain. She reports she did have nausea and vomiting prior to arrival at hospital, Poor historian for details. She denies fever, dyspnea, chest pain or chills. She reports dysuria \" sometimes\". She cannot say whether she has had hematemesis, melena or hematochezia. PMH includes hiatel hernia, CAD, COPD with no home oxygen use, dementia, Parkinson's disease, hyperlipidemia, hypertension, GERD diabetes mellitus type 2 on insulin and anemia.    Labs today show a urinalysis with moderate leukocytes, 6-12 WBCs and 4+ bacteria, COVID-19 negative, glucose 166, creatinine 1.35 serum WBC 12.6.  CT abdomen and pelvis per radiology:    \"\"IMPRESSION:  1.There is a large hiatal hernia with the gastric body and antrum above  the diaphragm. There is significant wall thickening of the esophagus.  The gastroesophageal junction is at the diaphragmatic hiatus and there  is distention of the excluded fundus. Obstruction at the hernia site is  suspected.  2.No free air or free fluid.  3.Mildly comminuted diverticulitis in the descending colon.  4.Status post hysterectomy.  \"    Review of records shows that she was admitted here in 2021 with an upper GI bleed and EGD done at that time showed a very large hiatal hernia with no active bleeding discovered.  She also had a UTI at that " time as well.  She was also admitted here in December 2021 with acute respiratory failure secondary to COPD exacerbation and elevated proBNP.  She denies any dyspnea today.  NG tube to low intermittent wall suction was placed in ED for decompression, she will be n.p.o., IV fluid hydration, she was started on 1 g IV ceftriaxone for UTI and gastroenterology has been consulted.  She will be admitted for further evaluation and treatment.    Per nursing home documentation patient has a DNR order.      Review of Systems   Unable to perform ROS: dementia       Personal History     Past Medical History:   Diagnosis Date   • Anemia    • Atherosclerotic heart disease of native coronary artery without angina pectoris    • COPD (chronic obstructive pulmonary disease) (HCC)    • COVID-19 03/2020   • Dementia (HCC)    • Diabetes mellitus (HCC)    • GERD (gastroesophageal reflux disease)    • Gout    • Hyperlipidemia    • Hypertension    • Localized edema    • Parkinson's disease (HCC)    • Vitamin D deficiency        Past Surgical History:   Procedure Laterality Date   • ENDOSCOPY N/A 1/2/2021    Procedure: ESOPHAGOGASTRODUODENOSCOPY;  Surgeon: Yoan Mendoza MD;  Location: [facility] ENDOSCOPY;  Service: Gastroenterology;  Laterality: N/A;  post: esophageal trauma from nasogastric tube, large hiatal hernia       Family History: Family history is unknown by patient. Otherwise pertinent FHx was reviewed and not pertinent to current issue.    Social History:  reports that she has never smoked. She has never used smokeless tobacco. She reports that she does not drink alcohol and does not use drugs.    Home Medications:  Prior to Admission Medications     Prescriptions Last Dose Informant Patient Reported? Taking?    acetaminophen (TYLENOL) 325 MG tablet  Nursing Home Yes No    Take 650 mg by mouth Every 6 (Six) Hours As Needed for Mild Pain  or Fever.    albuterol (PROVENTIL) (2.5 MG/3ML) 0.083% nebulizer solution   No No    Take 2.5  mg by nebulization Every 4 (Four) Hours As Needed for Wheezing.    allopurinol (ZYLOPRIM) 100 MG tablet  Nursing Home Yes No    Take 200 mg by mouth Daily.    aspirin 81 MG EC tablet  Nursing Home Yes No    Take 81 mg by mouth Daily.    budesonide-formoterol (Symbicort) 160-4.5 MCG/ACT inhaler   No No    Inhale 2 puffs 2 (Two) Times a Day.    carbidopa-levodopa (SINEMET)  MG per tablet  Nursing Home Yes No    Take 1 tablet by mouth 3 (Three) Times a Day.    clopidogrel (PLAVIX) 75 MG tablet  Nursing Home Yes No    Take 75 mg by mouth Daily.    furosemide (LASIX) 20 MG tablet   No No    Take 1 tablet by mouth 2 (Two) Times a Day. q day x 7 days, check bmp in one week. Then increase to BID    Glucagon, rDNA, (Glucagon Emergency) 1 MG kit  Nursing Home Yes No    Inject  as directed As Needed.    insulin glargine (LANTUS, SEMGLEE) 100 UNIT/ML injection   No No    Inject 20 Units under the skin into the appropriate area as directed Daily.    ipratropium-albuterol (DUO-NEB) 0.5-2.5 mg/3 ml nebulizer   No No    Take 3 mL by nebulization 4 (Four) Times a Day.    magnesium hydroxide (MILK OF MAGNESIA) 400 MG/5ML suspension  Nursing Home Yes No    Take 30 mL by mouth Daily As Needed for Constipation.    metoprolol tartrate (LOPRESSOR) 50 MG tablet  Nursing Home Yes No    Take 50 mg by mouth 2 (Two) Times a Day.    nitroglycerin (NITROSTAT) 0.4 MG SL tablet   No No    Place 1 tablet under the tongue Every 5 (Five) Minutes As Needed for Chest Pain (Only if SBP Greater Than 100). Take no more than 3 doses in 15 minutes.    nystatin (MYCOSTATIN) 036423 UNIT/GM powder  Nursing Home Yes No    Apply  topically to the appropriate area as directed 3 (Three) Times a Day. Under breasts/ abd folds    Omega-3 Fatty Acids (fish oil) 1000 MG capsule capsule   Yes No    Take 1,000 mg by mouth Daily With Breakfast.    pantoprazole (PROTONIX) 40 MG EC tablet   No No    Take 1 tablet by mouth Daily.    predniSONE (DELTASONE) 20 MG tablet    No No    Take 2 tablets by mouth Daily With Breakfast. 40 mg p.o. daily x3 days, 30 mg p.o. daily x3 days, 20 mg p.o. daily x3 days, 10 mg p.o. daily x3 days,    selenium sulfide (SELSUN) 1 % lotion   Yes No    Apply  topically to the appropriate area as directed Every 7 (Seven) Days. Tuesday            Allergies:  Allergies   Allergen Reactions   • Codeine Anaphylaxis   • Latex Anaphylaxis   • Namenda [Memantine] Anaphylaxis       Objective      Vitals:   Temp:  [98.7 °F (37.1 °C)] 98.7 °F (37.1 °C)  Heart Rate:  [62-74] 74  Resp:  [15-19] 17  BP: (124-169)/(68-70) 169/70  Flow (L/min):  [2] 2    Physical Exam  Vitals reviewed.   Constitutional:       Appearance: Normal appearance. She is obese.   HENT:      Head: Normocephalic and atraumatic.      Right Ear: External ear normal.      Left Ear: External ear normal.      Nose: Nose normal.      Mouth/Throat:      Mouth: Mucous membranes are moist.   Eyes:      Extraocular Movements: Extraocular movements intact.   Cardiovascular:      Rate and Rhythm: Normal rate and regular rhythm.      Pulses: Normal pulses.      Heart sounds: Normal heart sounds.   Pulmonary:      Effort: Pulmonary effort is normal.      Breath sounds: Normal breath sounds.   Abdominal:      Palpations: Abdomen is soft.   Genitourinary:     Comments: deferred  Musculoskeletal:         General: Normal range of motion.      Cervical back: Normal range of motion and neck supple.   Skin:     General: Skin is warm and dry.   Neurological:      General: No focal deficit present.      Mental Status: She is alert.      Comments: Oriented to self and place    Psychiatric:         Mood and Affect: Mood normal.         Behavior: Behavior normal.      Comments: Cooperative, poor historian          Result Review    Result Review:  I have personally reviewed the results from the time of this admission to 5/6/2022 00:18 EDT and agree with these findings:  [x]  Laboratory  [x]  Microbiology  [x]  Radiology  []   "EKG/Telemetry   []  Cardiology/Vascular   []  Pathology  [x]  Old records  []  Other:  Most notable findings include: Urinalysis showing moderate leukocytes 6-12 WBCs 4+ bacteria COVID-19 negative, glucose 166, creatinine 1.35, WBC 12.6  CT abdomen per radiology:    \"IMPRESSION:  1.There is a large hiatal hernia with the gastric body and antrum above  the diaphragm. There is significant wall thickening of the esophagus.  The gastroesophageal junction is at the diaphragmatic hiatus and there  is distention of the excluded fundus. Obstruction at the hernia site is  suspected.  2.No free air or free fluid.  3.Mildly comminuted diverticulitis in the descending colon.  4.Status post hysterectomy.    Assessment/Plan        Active Hospital Problems:  Active Hospital Problems    Diagnosis    • COPD (chronic obstructive pulmonary disease) ([facility])    • Nausea and vomiting, unspecified vomiting type    • Acute UTI (urinary tract infection)    • GERD (gastroesophageal reflux disease)    • Hypertension    • Dementia ([facility])    • Atherosclerotic heart disease of native coronary artery without angina pectoris    • Gout    • Parkinson's disease ([facility])    • CKD (chronic kidney disease) stage 4, GFR 15-29 ml/min ([facility])    • Hiatal hernia    • Hyperlipidemia    • Type 2 diabetes mellitus without complication, with long-term current use of insulin ([facility])      Plan:    Nausea and vomiting per CT may be secondary to large hiatal hernia and possible thickening of the esophagus possible obstruction at the hernia site, continue NG tube to intermittent low wall suction n.p.o., IV fluid hydration 4 mg Zofran every 6 hours as needed nausea gastroenterology consulted    Acute UTI, 3+ bacteria positive leukocytes, continue 1 g IV ceftriaxone until urine culture resulted      GERD, received 80 mg Protonix IV, continue 40 mg IV Protonix daily    COPD, stable on room air 97% no home oxygen use DuoNebs every 4 hours as needed shortness of air " wheezing    Hypertension, home meds unverified at this time reorder pending verification pharmacy monitor BP add as needed IV hydralazine if needed    Dementia, home meds unverified at this time reorder pending verification pharmacy    Coronary artery disease, stable no chest pain Home meds unverified at this time reorder pending verification pharmacy    Parkinson's disease, home meds unverified at this time reorder pending verification pharmacy    Gout, home meds unverified at this time reorder pending verification pharmacy    Chronic kidney disease stage IV per records creatinine today 1.35 improved from previous of 2.4-1.75 continue to trend renal function avoid nephrotoxic meds as much as possible    Diabetes mellitus type 2 insulin-dependent, serum glucose 166, home meds unverified at this time reorder pending verification pharmacy add SSI as needed with Accu-Cheks AC at bedtime old med list shows patient was on prednisone unclear if this is still true            DVT prophylaxis:  Mechanical DVT prophylaxis orders are present.    CODE STATUS:    Code Status (Patient has no pulse and is not breathing): No CPR (Do Not Attempt to Resuscitate)  Medical Interventions (Patient has pulse or is breathing): Full Support  Comments: document on chart from nursing home    Admission Status:  I believe this patient meets inpatient  status.    I discussed the patient's findings and my recommendations with patient.    This patient has been examined wearing appropriate Personal Protective Equipment . 05/06/22      Signature: Electronically signed by MAE Quiñones, 05/06/22, 12:21 AM EDT.

## 2022-05-06 NOTE — CASE MANAGEMENT/SOCIAL WORK
Discharge Planning Assessment  [facility]     Patient Name: oNry Duff  MRN: [id number]  Today's Date: 5/6/2022    Admit Date: 5/5/2022     Discharge Needs Assessment     Row Name 05/06/22 1457       Living Environment    Current Living Arrangements extended care facility    Duration at Residence From [facility], [facility].    Provides Primary Care For no one, unable/limited ability to care for self    Able to Return to Prior Arrangements yes    Living Arrangement Comments OK to retru  per daughter, REESE Rivas.       Resource/Environmental Concerns    Resource/Environmental Concerns none       Transition Planning    Patient/Family Anticipates Transition to long-term care facility    Patient/Family Anticipated Services at Transition     Transportation Anticipated health plan transportation       Discharge Needs Assessment    Readmission Within the Last 30 Days no previous admission in last 30 days    Concerns to be Addressed discharge planning    Anticipated Changes Related to Illness none    Equipment Needed After Discharge none    Outpatient/Agency/Support Group Needs long term acute care facility    Discharge Facility/Level of Care Needs nursing facility, intermediate    Provided Post Acute Provider List? N/A    N/A Provider List Comment Current [facility].    Patient's Choice of Community Agency(s) OK per daughter to return to [facility].    Discharge Coordination/Progress DC Plan: return to [facility], ok per daughter. [facility] needs to approve.               Discharge Plan     Row Name 05/06/22 1507       Plan    Plan Comments Transport  - will need ambulance states daughter, nonambulatory., incontinent, dementia.    Row Name 05/06/22 1504       Plan    Plan DC Plan: return to [facility], ok per daughter. [facility] needs to approve. 5/6 EGD.    Plan Comments Requested approval per [facility] approval to retrun, pending. Evelyn Posada is jaguar/REESE. Insurance  verified.  IV antbiotics, NG to [facility].              Continued Care and Services - Admitted Since 5/5/2022    Coordination has not been started for this encounter.          Demographic Summary     Row Name 05/06/22 1223       General Information    Admission Type inpatient    Arrived From emergency department    Required Notices Provided Important Message from Medicare    Referral Source admission list    Reason for Consult discharge planning    Preferred Language English    General Information Comments Spoke to daughter Evelyn LEIGH per phone.               Functional Status     Row Name 05/06/22 1225       Functional Status    Usual Activity Tolerance fair    Current Activity Tolerance poor       Functional Status, IADL    Medications completely dependent    Meal Preparation completely dependent    Housekeeping completely dependent    Laundry completely dependent    Shopping completely dependent       Mental Status    General Appearance WDL WDL       Mental Status Summary    Recent Changes in Mental Status/Cognitive Functioning no changes    Mental Status Comments Dementia pt.              Phone communication or documentation only - no physical contact with patient or family.               Kavin Weber, RN

## 2022-05-06 NOTE — OP NOTE
ESOPHAGOGASTRODUODENOSCOPY  Procedure Report    Patient Name:  Nory Duff  YOB: 1940    Date of Surgery:  5/6/2022     Indications: Nausea and vomiting   Large complex hiatal hernia with dilation of the fundus    Pre-op Diagnosis:   Nausea and vomiting, unspecified vomiting type [R11.2]         Post-op Diagnosis:  Large complex hiatal hernia status post endoscopic placement of NG tube at the junction of the stomach body and antrum      Procedure(s):  ESOPHAGOGASTRODUODENOSCOPY with nasogastric tube placement    Staff:  Surgeon(s):  Sameer Lai MD Dresner, David Michael, MD         Anesthesia: Monitored Anesthesia Care    Estimated Blood Loss: None  Implants:    Implant Name Type Inv. Item Serial No.  Lot No. LRB No. Used Action   DEV CLIP ENDO [id number] CONTRL ROT [id number] - [id number] Implant DEV CLIP ENDO [id number] CONTRL ROT 235CM  GLIIF [id number] N/A 1 Implanted       Specimen:          None    Complications:  No immediate    Description of Procedure:  Informed consent was obtained from the patient.  They were placed in the left lateral decubitus position and IV conscious sedation was administered by anesthesia while being monitored continuously throughout the procedure.  The video Olympus endoscope was introduced in the esophagus and was advanced under direct vision into the stomach.  Dr. Lai was in attendance.  There is a large complex hiatal hernia with what looks to be a second area of diaphragmatic herniation through which the stomach body protrudes.  I was able to eventually find my way out into the antrum through the pylorus and into the second portion of the duodenum which is normal the bulb was normal the pylorus is patent the antrum and stomach body were thoroughly inspected including retroflexion views.  The majority of the stomach body if not the entire stomach body appears to be above the diaphragmatic hiatus.  The fundus  and cardia also appeared to be above the diaphragmatic hiatus however, CT suggested the fundus remained in the abdomen with the stomach body and antrum in the chest.  There are no gastric ulcers or erosions or varices.  The squamocolumnar line is at the GE junction there is a moderate amount of erosive esophagitis extending about a third of the way up the esophagus likely from her protracted vomiting remaining esophagus was normal.    Next an 18-gauge NG tube with a 2-0 Vicryl suture put through the end of it was passed into the esophageal inlet under direct endoscopic observation and it was carried down into the stomach where I grasped the suture and was able, with moderate to significant difficulty, to maneuver it to the junction of the stomach antrum and body where the suture was clipped to the antral wall to prevent the NG tube from being pulled out on withdrawing the scope.  The scope was withdrawn and the NG remained in good position.  She tolerated the procedure well returned recovery in good condition.    Impression:  Large complex hiatal hernia causing volvulus type physiology with dilation and presumptive obstruction of the fundus and vomiting  GERD    Recommendations:  Twice daily PPI IV  IV fluid  NG to low intermittent wall suction  I suspect surgery is going to have to repair this although a barium study could be performed in a day or 2 through the NG tube after she has been decompressed to see what the residual anatomy looks like  We appreciate the referral and will see as needed.      Saleem Rubin MD     Date: 5/6/2022  Time: 10:36 EDT

## 2022-05-06 NOTE — ANESTHESIA PREPROCEDURE EVALUATION
Anesthesia Evaluation     Patient summary reviewed and Nursing notes reviewed   no history of anesthetic complications:  NPO Solid Status: > 8 hours  NPO Liquid Status: > 2 hours           Airway   Mallampati: II  TM distance: >3 FB  Neck ROM: full  No difficulty expected  Dental - normal exam     Pulmonary - normal exam   (+) COPD, shortness of breath,   Cardiovascular - normal exam    ECG reviewed    (+) hypertension, CAD, hyperlipidemia,       Neuro/Psych  (+) psychiatric history Anxiety and Depression, dementia,    GI/Hepatic/Renal/Endo    (+) obesity,  hiatal hernia, GERD poorly controlled,  renal disease CRI, diabetes mellitus type 2 using insulin,     Musculoskeletal (-) negative ROS    Abdominal  - normal exam   Substance History - negative use     OB/GYN negative ob/gyn ROS         Other - negative ROS                       Anesthesia Plan    ASA 3     MAC   total IV anesthesia  intravenous induction     Anesthetic plan, all risks, benefits, and alternatives have been provided, discussed and informed consent has been obtained with: patient.    Plan discussed with CAA.        CODE STATUS:    Code Status (Patient has no pulse and is not breathing): No CPR (Do Not Attempt to Resuscitate)  Medical Interventions (Patient has pulse or is breathing): Full Support  Comments: document on chart from nursing [place]

## 2022-05-06 NOTE — ANESTHESIA POSTPROCEDURE EVALUATION
Patient: Nory Duff    Procedure Summary     Date: 05/06/22 Room / Location: [facility] ENDOSCOPY 1 / [facility] ENDOSCOPY    Anesthesia Start: 0957 Anesthesia Stop: 1040    Procedure: ESOPHAGOGASTRODUODENOSCOPY with nasogastric tube placement (N/A ) Diagnosis:       Nausea and vomiting, unspecified vomiting type      (Nausea and vomiting, unspecified vomiting type [R11.2])    Surgeons: Saleem Rubin MD Provider: Gabriel Strange MD    Anesthesia Type: MAC ASA Status: 3          Anesthesia Type: MAC    Vitals  Vitals Value Taken Time   /52 05/06/22 1049   Temp 97 °F (36.1 °C) 05/06/22 1034   Pulse 75 05/06/22 1049   Resp 19 05/06/22 1049   SpO2 95 % 05/06/22 1049           Post Anesthesia Care and Evaluation    Patient location during evaluation: PACU  Patient participation: complete - patient participated  Level of consciousness: awake  Pain score: 0  Pain management: adequate  Airway patency: patent  Anesthetic complications: No anesthetic complications  PONV Status: none  Cardiovascular status: acceptable  Respiratory status: acceptable  Hydration status: acceptable    Comments: Patient seen and examined postoperatively; vital signs stable; SpO2 greater than or equal to 90%; cardiopulmonary status stable; nausea/vomiting adequately controlled; pain adequately controlled; no apparent anesthesia complications; patient discharged from anesthesia care when discharge criteria were met

## 2022-05-06 NOTE — PROGRESS NOTES
[facility] Medicine Services Daily Progress Note    Patient Name: Nory Duff  : 1940  MRN: [id number]  Primary Care Physician:  Giovani Mejia MD  Date of admission: 2022      Subjective      Chief Complaint: Nausea vomiting    Seen and examined bedside.  Pleasantly confused.  NG in place.  No complaints.    Review of systems  GI positive for nausea positive for vomiting  Pulmonary no shortness of breath no cough  Cardiac no chest pain or palpitation      Objective      Vitals:   Temp:  [97 °F (36.1 °C)-98.7 °F (37.1 °C)] 98.1 °F (36.7 °C)  Heart Rate:  [62-88] 88  Resp:  [15-22] 16  BP: (109-169)/(40-83) 154/83  Flow (L/min):  [2] 2    Physical Exam   Constitutional: AAO x1 to self.  HENT:   Head: Normocephalic and atraumatic.   Eyes: Conjunctivae and EOM are normal. Pupils are equal, round, and reactive to light.   Neck: No JVD present. No thyromegaly present.   Cardiovascular: Normal rate, regular rhythm, normal heart sounds and intact distal pulses. Exam reveals no gallop and no friction rub.   No murmur heard.  Pulmonary/Chest: Effort normal and breath sounds normal. No stridor. No respiratory distress.  has no wheezes.  has no rales.  exhibits no tenderness.   Abdominal: Soft. Bowel sounds are normal.  no distension and no mass. There is no tenderness. There is no rebound and no guarding. No hernia.   Musculoskeletal: Normal range of motion.   Lymphadenopathy:     no cervical adenopathy.   Neurological:  alert . No cranial nerve deficit or sensory deficit. exhibits normal muscle tone.   Skin: No rash noted.  not diaphoretic.   Psychiatric:  normal mood and affect.   Vitals reviewed.         Result Review    Result Review:  I have personally reviewed the results from the time of this admission to 2022 13:31 EDT and agree with these findings:  [x]  Laboratory  []  Microbiology  [x]  Radiology  []  EKG/Telemetry   []  Cardiology/Vascular   []  Pathology  []   Old records  []  Other:          Assessment/Plan      Brief Patient Summary:  Nory Duff is a 82 y.o. female who presents with nausea vomiting.      allopurinol, 100 mg, Oral, Daily  aspirin, 81 mg, Oral, Daily  budesonide-formoterol, 2 puff, Inhalation, BID - RT  carbidopa-levodopa, 1 tablet, Oral, TID  cefTRIAXone, 1 g, Intravenous, Q24H  clopidogrel, 75 mg, Oral, Daily  furosemide, 20 mg, Oral, BID  insulin glargine, 10 Units, Subcutaneous, Daily  insulin lispro, 0-14 Units, Subcutaneous, TID AC  metoprolol tartrate, 50 mg, Oral, BID  nystatin, , Topical, TID  pantoprazole, 40 mg, Intravenous, Q12H  sodium chloride, 3 mL, Intravenous, Q12H       sodium chloride, 125 mL/hr, Last Rate: 125 mL/hr (05/06/22 1311)         Active Hospital Problems:  Active Hospital Problems    Diagnosis    • COPD (chronic obstructive pulmonary disease) ([facility])    • Nausea and vomiting, unspecified vomiting type    • Acute UTI (urinary tract infection)    • GERD (gastroesophageal reflux disease)    • Hypertension    • Dementia ([facility])    • Atherosclerotic heart disease of native coronary artery without angina pectoris    • Gout    • Parkinson's disease ([facility])    • CKD (chronic kidney disease) stage 4, GFR 15-29 ml/min ([facility])    • Hiatal hernia    • Hyperlipidemia    • Type 2 diabetes mellitus without complication, with long-term current use of insulin ([facility])      Plan:   Nausea vomiting  Status post EGD found to have gastric obstruction volvulus type  General surgery consult  N.p.o. NG to low intermittent suction  IV fluids    COPD  Not exacerbation  Nebs as needed  Continue Symbicort    UTI  Continue ceftriaxone  Follow-up on micro    IZABELLA  Hold Lasix  IV fluids    GERD  Continue PPI    Hypertension  Continue metoprolol    CAD  Continue Plavix aspirin beta-blocker    Parkinson's disease  Continue Sinemet    Diabetes  We will continue with insulin sliding scale and Accu-Cheks every 6 while n.p.o. we will hold off on Lantus for  now    DVT PUD prophylaxis     Plan as above      DVT prophylaxis:  Mechanical DVT prophylaxis orders are present.    CODE STATUS:    Code Status (Patient has no pulse and is not breathing): No CPR (Do Not Attempt to Resuscitate)  Medical Interventions (Patient has pulse or is breathing): Full Support  Comments: document on chart from nursing home      Disposition:  I expect patient to be discharged when clinically indicated.      Electronically signed by Serge Patel MD, 05/06/22, 13:31 EDT.  [facility] Hospitalist Team

## 2022-05-07 LAB
ALBUMIN SERPL-MCNC: 2.9 G/DL (ref 3.5–5.2)
ALBUMIN/GLOB SERPL: 1 G/DL
ALP SERPL-CCNC: 81 U/L (ref 39–117)
ALT SERPL W P-5'-P-CCNC: <5 U/L (ref 1–33)
ANION GAP SERPL CALCULATED.3IONS-SCNC: 10 MMOL/L (ref 5–15)
AST SERPL-CCNC: 8 U/L (ref 1–32)
BACTERIA SPEC AEROBE CULT: ABNORMAL
BASOPHILS # BLD AUTO: 0.1 10*3/MM3 (ref 0–0.2)
BASOPHILS NFR BLD AUTO: 1.1 % (ref 0–1.5)
BILIRUB SERPL-MCNC: 0.3 MG/DL (ref 0–1.2)
BUN SERPL-MCNC: 17 MG/DL (ref 8–23)
BUN/CREAT SERPL: 13.3 (ref 7–25)
CALCIUM SPEC-SCNC: 9 MG/DL (ref 8.6–10.5)
CHLORIDE SERPL-SCNC: 104 MMOL/L (ref 98–107)
CO2 SERPL-SCNC: 29 MMOL/L (ref 22–29)
CREAT SERPL-MCNC: 1.28 MG/DL (ref 0.57–1)
DEPRECATED RDW RBC AUTO: 52.1 FL (ref 37–54)
EGFRCR SERPLBLD CKD-EPI 2021: 41.4 ML/MIN/1.73
EOSINOPHIL # BLD AUTO: 0.4 10*3/MM3 (ref 0–0.4)
EOSINOPHIL NFR BLD AUTO: 4.1 % (ref 0.3–6.2)
ERYTHROCYTE [DISTWIDTH] IN BLOOD BY AUTOMATED COUNT: 15.8 % (ref 12.3–15.4)
GLOBULIN UR ELPH-MCNC: 3 GM/DL
GLUCOSE BLDC GLUCOMTR-MCNC: 103 MG/DL (ref 70–105)
GLUCOSE BLDC GLUCOMTR-MCNC: 122 MG/DL (ref 70–105)
GLUCOSE BLDC GLUCOMTR-MCNC: 124 MG/DL (ref 70–105)
GLUCOSE BLDC GLUCOMTR-MCNC: 91 MG/DL (ref 70–105)
GLUCOSE SERPL-MCNC: 123 MG/DL (ref 65–99)
HCT VFR BLD AUTO: 32.7 % (ref 34–46.6)
HGB BLD-MCNC: 10.8 G/DL (ref 12–15.9)
LYMPHOCYTES # BLD AUTO: 1.2 10*3/MM3 (ref 0.7–3.1)
LYMPHOCYTES NFR BLD AUTO: 14.4 % (ref 19.6–45.3)
MCH RBC QN AUTO: 30.1 PG (ref 26.6–33)
MCHC RBC AUTO-ENTMCNC: 33 G/DL (ref 31.5–35.7)
MCV RBC AUTO: 91.4 FL (ref 79–97)
MONOCYTES # BLD AUTO: 0.7 10*3/MM3 (ref 0.1–0.9)
MONOCYTES NFR BLD AUTO: 8 % (ref 5–12)
NEUTROPHILS NFR BLD AUTO: 6.3 10*3/MM3 (ref 1.7–7)
NEUTROPHILS NFR BLD AUTO: 72.4 % (ref 42.7–76)
NRBC BLD AUTO-RTO: 0.1 /100 WBC (ref 0–0.2)
PLATELET # BLD AUTO: 283 10*3/MM3 (ref 140–450)
PMV BLD AUTO: 8 FL (ref 6–12)
POTASSIUM SERPL-SCNC: 3.8 MMOL/L (ref 3.5–5.2)
PROT SERPL-MCNC: 5.9 G/DL (ref 6–8.5)
RBC # BLD AUTO: 3.58 10*6/MM3 (ref 3.77–5.28)
SODIUM SERPL-SCNC: 143 MMOL/L (ref 136–145)
WBC NRBC COR # BLD: 8.7 10*3/MM3 (ref 3.4–10.8)

## 2022-05-07 PROCEDURE — 85025 COMPLETE CBC W/AUTO DIFF WBC: CPT | Performed by: NURSE PRACTITIONER

## 2022-05-07 PROCEDURE — 25010000002 CEFTRIAXONE PER 250 MG: Performed by: HOSPITALIST

## 2022-05-07 PROCEDURE — 94799 UNLISTED PULMONARY SVC/PX: CPT

## 2022-05-07 PROCEDURE — 82962 GLUCOSE BLOOD TEST: CPT

## 2022-05-07 PROCEDURE — 80053 COMPREHEN METABOLIC PANEL: CPT | Performed by: NURSE PRACTITIONER

## 2022-05-07 PROCEDURE — 99232 SBSQ HOSP IP/OBS MODERATE 35: CPT | Performed by: HOSPITALIST

## 2022-05-07 PROCEDURE — 99231 SBSQ HOSP IP/OBS SF/LOW 25: CPT | Performed by: SURGERY

## 2022-05-07 RX ADMIN — NYSTATIN: 100000 POWDER TOPICAL at 21:30

## 2022-05-07 RX ADMIN — METOPROLOL TARTRATE 50 MG: 50 TABLET, FILM COATED ORAL at 09:05

## 2022-05-07 RX ADMIN — ALLOPURINOL 100 MG: 100 TABLET ORAL at 09:05

## 2022-05-07 RX ADMIN — BUDESONIDE AND FORMOTEROL FUMARATE DIHYDRATE 2 PUFF: 160; 4.5 AEROSOL RESPIRATORY (INHALATION) at 20:52

## 2022-05-07 RX ADMIN — BUDESONIDE AND FORMOTEROL FUMARATE DIHYDRATE 2 PUFF: 160; 4.5 AEROSOL RESPIRATORY (INHALATION) at 06:52

## 2022-05-07 RX ADMIN — CARBIDOPA AND LEVODOPA 1 TABLET: 25; 100 TABLET ORAL at 09:05

## 2022-05-07 RX ADMIN — PANTOPRAZOLE SODIUM 40 MG: 40 INJECTION, POWDER, FOR SOLUTION INTRAVENOUS at 23:03

## 2022-05-07 RX ADMIN — NYSTATIN: 100000 POWDER TOPICAL at 09:13

## 2022-05-07 RX ADMIN — Medication 3 ML: at 21:30

## 2022-05-07 RX ADMIN — NYSTATIN: 100000 POWDER TOPICAL at 17:08

## 2022-05-07 RX ADMIN — CLOPIDOGREL BISULFATE 75 MG: 75 TABLET ORAL at 09:05

## 2022-05-07 RX ADMIN — ACETAMINOPHEN 650 MG: 325 TABLET ORAL at 09:05

## 2022-05-07 RX ADMIN — ASPIRIN 81 MG: 81 TABLET, CHEWABLE ORAL at 09:05

## 2022-05-07 RX ADMIN — Medication 3 ML: at 09:14

## 2022-05-07 RX ADMIN — CARBIDOPA AND LEVODOPA 1 TABLET: 25; 100 TABLET ORAL at 17:08

## 2022-05-07 RX ADMIN — CARBIDOPA AND LEVODOPA 1 TABLET: 25; 100 TABLET ORAL at 23:03

## 2022-05-07 RX ADMIN — CEFTRIAXONE 1 G: 1 INJECTION, POWDER, FOR SOLUTION INTRAMUSCULAR; INTRAVENOUS at 23:02

## 2022-05-07 RX ADMIN — METOPROLOL TARTRATE 50 MG: 50 TABLET, FILM COATED ORAL at 23:03

## 2022-05-07 RX ADMIN — PANTOPRAZOLE SODIUM 40 MG: 40 INJECTION, POWDER, FOR SOLUTION INTRAVENOUS at 09:05

## 2022-05-07 RX ADMIN — Medication 3 ML: at 09:13

## 2022-05-07 NOTE — CONSULTS
Name: Nory Duff  Age: 82 y.o.  : 1940  Sex: female    20    Reason for Consultation:  Acute kidney injury    Chief Complaint:  Nausea vomiting and diarrhea    History of Present Illness :    Patient 82-year-old female with chronic kidney disease stage III, coronary artery disease dementia, diabetes, COPD admitted with complaints of nausea and vomiting.    Creatinine on admission was 1.3.  Patient underwent EGD which showed gastric obstruction with volvulus.  Patient has NG tube placed and she is on IV fluids.    Review of Systems    Review of Systems  Positive for nausea and vomiting.    Past Medical History    Past Medical History:   Diagnosis Date   • Anemia    • Atherosclerotic heart disease of native coronary artery without angina pectoris    • COPD (chronic obstructive pulmonary disease) (HCC)    • COVID-19 2020   • Dementia (HCC)    • Diabetes mellitus (HCC)    • GERD (gastroesophageal reflux disease)    • Gout    • Hyperlipidemia    • Hypertension    • Localized edema    • Parkinson's disease (HCC)    • Vitamin D deficiency        Past Surgical History    Past Surgical History:   Procedure Laterality Date   • ENDOSCOPY N/A 2021    Procedure: ESOPHAGOGASTRODUODENOSCOPY;  Surgeon: Yoan Mendoza MD;  Location: [facility] ENDOSCOPY;  Service: Gastroenterology;  Laterality: N/A;  post: esophageal trauma from nasogastric tube, large hiatal hernia         Family History  Family History   Family history unknown: Yes       Objective:    Vital Signs  Temp:  [97.7 °F (36.5 °C)-98.1 °F (36.7 °C)] 98 °F (36.7 °C)  Heart Rate:  [56-88] 61  Resp:  [16-18] 16  BP: (110-154)/(60-83) 125/70        Intake/Output Summary (Last 24 hours) at 2022 1149  Last data filed at 2022 0500  Gross per 24 hour   Intake 50 ml   Output 0 ml   Net 50 ml             Physical Exam   Physical Exam  Constitutional:       General: She is not in acute distress.     Appearance: She is well-developed. She is not  diaphoretic.   HENT:      Head: Normocephalic and atraumatic.      Nose: Nose normal.   Eyes:      General:         Right eye: No discharge.         Left eye: No discharge.      Conjunctiva/sclera: Conjunctivae normal.      Pupils: Pupils are equal, round, and reactive to light.   Neck:      Thyroid: No thyromegaly.      Vascular: No JVD.      Trachea: No tracheal deviation.   Cardiovascular:      Rate and Rhythm: Normal rate and regular rhythm.      Heart sounds: Normal heart sounds. No murmur heard.    No friction rub. No gallop.   Pulmonary:      Effort: Pulmonary effort is normal. No respiratory distress.      Breath sounds: Normal breath sounds. No stridor. No wheezing or rales.   Chest:      Chest wall: No tenderness.   Abdominal:      General: Bowel sounds are normal. There is no distension.      Palpations: Abdomen is soft. There is no mass.      Tenderness: There is no abdominal tenderness. There is no guarding or rebound.   Musculoskeletal:         General: No tenderness or deformity. Normal range of motion.      Cervical back: Normal range of motion and neck supple.   Lymphadenopathy:      Cervical: No cervical adenopathy.   Skin:     General: Skin is warm and dry.      Coloration: Skin is not pale.      Findings: No erythema or rash.   Neurological:      Mental Status: She is alert and oriented to person, place, and time.      Cranial Nerves: No cranial nerve deficit.      Motor: No abnormal muscle tone.      Coordination: Coordination normal.      Deep Tendon Reflexes: Reflexes normal.   Psychiatric:         Behavior: Behavior normal.         Thought Content: Thought content normal.         Judgment: Judgment normal.                  Results Review:      Results from last 7 days   Lab Units 05/07/22  0209 05/06/22  0315 05/05/22  2201   SODIUM mmol/L 143 142 140   CO2 mmol/L 29.0 31.0* 32.0*   BUN mg/dL 17 21 22   CREATININE mg/dL 1.28* 1.34* 1.35*   CALCIUM mg/dL 9.0 9.3 9.8   ALBUMIN g/dL 2.90*  --   3.60   AST (SGOT) U/L 8  --  10   ALT (SGPT) U/L <5  --  <5   EGFR mL/min/1.73 41.4* 39.2* 39.1*       Results from last 7 days   Lab Units 05/07/22  0209 05/06/22  0315 05/05/22  2201   WBC 10*3/mm3 8.70 10.80 12.60*       Pertinent Imaging studies were reviewed      Medication Review:     allopurinol, 100 mg, Nasogastric, Daily  aspirin, 81 mg, Nasogastric, Daily  budesonide-formoterol, 2 puff, Inhalation, BID - RT  carbidopa-levodopa, 1 tablet, Nasogastric, TID  cefTRIAXone, 1 g, Intravenous, Q24H  clopidogrel, 75 mg, Nasogastric, Daily  insulin lispro, 0-9 Units, Subcutaneous, Q6H  metoprolol tartrate, 50 mg, Nasogastric, BID  nystatin, , Topical, TID  pantoprazole, 40 mg, Intravenous, Q12H  sodium chloride, 3 mL, Intravenous, Q12H  sodium chloride, 3 mL, Intravenous, Q12H      sodium chloride, 125 mL/hr, Last Rate: 125 mL/hr (05/06/22 1311)        Assessment  Acute kidney injury most likely appears to be secondary to decrease in intravascular volume    Nausea and vomiting  Status post EGD which showed a large complex hiatal hernia and dilation of the fundus    Hypertension    Coronary artery disease      Plan:  Check urine electrolytes    Continue IV fluids    Continue off diuretics    Further recommendations based on hospital course    Pradeep Jacob MD  05/07/22  11:49 EDT  Tel: [phone]  Fax: [phone]

## 2022-05-07 NOTE — PROGRESS NOTES
[facility] Medicine Services Daily Progress Note    Patient Name: Nory Duff  : 1940  MRN: [id number]  Primary Care Physician:  Giovani Mejia MD  Date of admission: 2022      Subjective      Chief Complaint: Nausea vomiting    Continues to improve, pleasantly confused.    Review of systems  GI positive for nausea positive for vomiting  Pulmonary no shortness of breath no cough  Cardiac no chest pain or palpitation      Objective      Vitals:   Temp:  [97 °F (36.1 °C)-98.1 °F (36.7 °C)] 97.7 °F (36.5 °C)  Heart Rate:  [56-88] 72  Resp:  [16-22] 18  BP: (110-154)/(40-83) 140/62  Flow (L/min):  [2] 2    Physical Exam   Constitutional: AAO x1 to self.  HENT:   Head: Normocephalic and atraumatic.   Eyes: Conjunctivae and EOM are normal. Pupils are equal, round, and reactive to light.   Neck: No JVD present. No thyromegaly present.   Cardiovascular: Normal rate, regular rhythm, normal heart sounds and intact distal pulses. Exam reveals no gallop and no friction rub.   No murmur heard.  Pulmonary/Chest: Effort normal and breath sounds normal. No stridor. No respiratory distress.  has no wheezes.  has no rales.  exhibits no tenderness.   Abdominal: Soft. Bowel sounds are normal.  no distension and no mass. There is no tenderness. There is no rebound and no guarding. No hernia.   Musculoskeletal: Normal range of motion.   Lymphadenopathy:     no cervical adenopathy.   Neurological:  alert . No cranial nerve deficit or sensory deficit. exhibits normal muscle tone.   Skin: No rash noted.  not diaphoretic.   Psychiatric:  normal mood and affect.   Vitals reviewed.         Result Review    Result Review:  I have personally reviewed the results from the time of this admission to 2022 08:48 EDT and agree with these findings:  [x]  Laboratory  []  Microbiology  [x]  Radiology  []  EKG/Telemetry   []  Cardiology/Vascular   []  Pathology  []  Old records  []   Other:          Assessment/Plan      Brief Patient Summary:  Nory Duff is a 82 y.o. female who presents with nausea vomiting.      allopurinol, 100 mg, Nasogastric, Daily  aspirin, 81 mg, Nasogastric, Daily  budesonide-formoterol, 2 puff, Inhalation, BID - RT  carbidopa-levodopa, 1 tablet, Nasogastric, TID  cefTRIAXone, 1 g, Intravenous, Q24H  clopidogrel, 75 mg, Nasogastric, Daily  insulin lispro, 0-9 Units, Subcutaneous, Q6H  metoprolol tartrate, 50 mg, Nasogastric, BID  nystatin, , Topical, TID  pantoprazole, 40 mg, Intravenous, Q12H  sodium chloride, 3 mL, Intravenous, Q12H  sodium chloride, 3 mL, Intravenous, Q12H       sodium chloride, 125 mL/hr, Last Rate: 125 mL/hr (05/06/22 1311)         Active Hospital Problems:  Active Hospital Problems    Diagnosis    • COPD (chronic obstructive pulmonary disease) ([facility])    • Nausea and vomiting, unspecified vomiting type    • Acute UTI (urinary tract infection)    • GERD (gastroesophageal reflux disease)    • Hypertension    • Dementia ([facility])    • Atherosclerotic heart disease of native coronary artery without angina pectoris    • Gout    • Parkinson's disease ([facility])    • CKD (chronic kidney disease) stage 4, GFR 15-29 ml/min ([facility])    • Hiatal hernia    • Hyperlipidemia    • Type 2 diabetes mellitus without complication, with long-term current use of insulin ([facility])      Plan:   Nausea vomiting  Resolved  Status post EGD found to have gastric obstruction volvulus type  General surgery consult likely nonoperative management if possible  N.p.o. NG to low intermittent suction  IV fluids  Restart diet per general surgery    COPD  Not exacerbation  Nebs as needed  Continue Symbicort    UTI  Continue ceftriaxone  Follow-up on micro gram-negative bacilli grown so far in the urine.    IZABELLA  Hold Lasix  IV fluids    GERD  Continue PPI    Hypertension  Continue metoprolol    CAD  Continue Plavix aspirin beta-blocker    Parkinson's disease  Continue Sinemet    Diabetes  We  will continue with insulin sliding scale and Accu-Cheks every 6 while n.p.o. we will hold off on Lantus for now    DVT PUD prophylaxis     Plan as above      DVT prophylaxis:  Mechanical DVT prophylaxis orders are present.    CODE STATUS:    Code Status (Patient has no pulse and is not breathing): No CPR (Do Not Attempt to Resuscitate)  Medical Interventions (Patient has pulse or is breathing): Full Support  Comments: document on chart from nursing home      Disposition:  I expect patient to be discharged when clinically indicated.      Electronically signed by Serge Patel MD, 05/07/22, 08:48 EDT.  [facility] Hospitalist Team

## 2022-05-07 NOTE — PROGRESS NOTES
GENERAL SURGERY PROGRESS NOTE  Chief Complaint:  Surgery Follow up   LOS: 2 days       Subjective     Interval History:     Feels okay.  Daughter at bedside.    Objective     Vital Signs  Temp:  [97.7 °F (36.5 °C)-98.1 °F (36.7 °C)] 98.1 °F (36.7 °C)  Heart Rate:  [56-77] 61  Resp:  [16-18] 17  BP: (110-150)/(60-80) 150/73    Physical Exam:   Abdomen soft, nontender, NG tube in place  Labs:  Lab Results (last 24 hours)     Procedure Component Value Units Date/Time    POC Glucose Once [[id number]]  (Normal) Collected: 05/07/22 1306    Specimen: Blood Updated: 05/07/22 1307     Glucose 103 mg/dL      Comment: Serial Number: [id number]:  [id number]       Urine Culture - Urine, Urine, Catheter [[id number]]  (Abnormal)  (Susceptibility) Collected: 05/05/22 2224    Specimen: Urine, Catheter Updated: 05/07/22 1115     Urine Culture >100,000 CFU/mL Escherichia coli    Susceptibility      Escherichia coli      ERNESTO      Ampicillin Susceptible      Ampicillin + Sulbactam Susceptible      Cefazolin Susceptible      Cefepime Susceptible      Ceftazidime Susceptible      Ceftriaxone Susceptible      Gentamicin Susceptible      Levofloxacin Susceptible      Nitrofurantoin Susceptible      Piperacillin + Tazobactam Susceptible      Trimethoprim + Sulfamethoxazole Susceptible                    Linear View                   POC Glucose Once [[id number]]  (Abnormal) Collected: 05/07/22 0658    Specimen: Blood Updated: 05/07/22 0659     Glucose 122 mg/dL      Comment: Serial Number: [id number]:  [id number]       Comprehensive Metabolic Panel [[id number]]  (Abnormal) Collected: 05/07/22 0209    Specimen: Blood Updated: 05/07/22 0322     Glucose 123 mg/dL      BUN 17 mg/dL      Creatinine 1.28 mg/dL      Sodium 143 mmol/L      Potassium 3.8 mmol/L      Chloride 104 mmol/L      CO2 29.0 mmol/L      Calcium 9.0 mg/dL      Total Protein 5.9 g/dL      Albumin 2.90 g/dL      ALT (SGPT) <5 U/L      AST (SGOT) 8 U/L      Alkaline  Phosphatase 81 U/L      Total Bilirubin 0.3 mg/dL      Globulin 3.0 gm/dL      A/G Ratio 1.0 g/dL      BUN/Creatinine Ratio 13.3     Anion Gap 10.0 mmol/L      eGFR 41.4 mL/min/1.73      Comment: National Kidney Foundation and American Society of Nephrology (ASN) Task Force recommended calculation based on the Chronic Kidney Disease Epidemiology Collaboration (CKD-EPI) equation refit without adjustment for race.       Narrative:      GFR Normal >60  Chronic Kidney Disease <60  Kidney Failure <15      CBC & Differential [[id number]]  (Abnormal) Collected: 05/07/22 0209    Specimen: Blood Updated: 05/07/22 0245    Narrative:      The following orders were created for panel order CBC & Differential.  Procedure                               Abnormality         Status                     ---------                               -----------         ------                     CBC Auto Differential[[id number]]        Abnormal            Final result                 Please view results for these tests on the individual orders.    CBC Auto Differential [[id number]]  (Abnormal) Collected: 05/07/22 0209    Specimen: Blood Updated: 05/07/22 0245     WBC 8.70 10*3/mm3      RBC 3.58 10*6/mm3      Hemoglobin 10.8 g/dL      Hematocrit 32.7 %      MCV 91.4 fL      MCH 30.1 pg      MCHC 33.0 g/dL      RDW 15.8 %      RDW-SD 52.1 fl      MPV 8.0 fL      Platelets 283 10*3/mm3      Neutrophil % 72.4 %      Lymphocyte % 14.4 %      Monocyte % 8.0 %      Eosinophil % 4.1 %      Basophil % 1.1 %      Neutrophils, Absolute 6.30 10*3/mm3      Lymphocytes, Absolute 1.20 10*3/mm3      Monocytes, Absolute 0.70 10*3/mm3      Eosinophils, Absolute 0.40 10*3/mm3      Basophils, Absolute 0.10 10*3/mm3      nRBC 0.1 /100 WBC     POC Glucose Once [[id number]]  (Abnormal) Collected: 05/07/22 0131    Specimen: Blood Updated: 05/07/22 0132     Glucose 124 mg/dL      Comment: Serial Number: [id number]:  [id number]       POC Glucose Once [[id number]]   (Abnormal) Collected: 05/06/22 1637    Specimen: Blood Updated: 05/06/22 1638     Glucose 139 mg/dL      Comment: Serial Number: [id number]:  [id number]              Results Review:     Labs and imaging for today were reviewed.    Assessment/Plan     Nory Duff is a 82 y.o. female who has cardiac history and has large hiatal hernia with recent episodes of nausea vomiting likely due to obstruction at the site of hernia.      Can DC NG tube and try clears.    Will discuss further with patient and family whether they would like to proceed for repair.  Needs cardiac evaluation for preoperative risk stratification.  Needs Plavix held in case surgery is needed.          This document has been electronically signed by Sameer Lai MD on May 7, 2022 15:43 EDT        Sameer Lai MD  05/07/22  15:43 EDT

## 2022-05-07 NOTE — PLAN OF CARE
Goal Outcome Evaluation:  Plan of Care Reviewed With: patient        Progress: no change  Outcome Evaluation: Patient without complaints of pain.  Pleasantly confused.  NG removed, tolerated well.  Clear liquid diet started.  Vital signs stable. Call light within reach. Care plan on going.

## 2022-05-08 LAB
ANION GAP SERPL CALCULATED.3IONS-SCNC: 13 MMOL/L (ref 5–15)
BASOPHILS # BLD AUTO: 0.1 10*3/MM3 (ref 0–0.2)
BASOPHILS NFR BLD AUTO: 0.9 % (ref 0–1.5)
BILIRUB UR QL STRIP: NEGATIVE
BUN SERPL-MCNC: 13 MG/DL (ref 8–23)
BUN/CREAT SERPL: 10.7 (ref 7–25)
CALCIUM SPEC-SCNC: 8.4 MG/DL (ref 8.6–10.5)
CHLORIDE SERPL-SCNC: 103 MMOL/L (ref 98–107)
CLARITY UR: ABNORMAL
CO2 SERPL-SCNC: 25 MMOL/L (ref 22–29)
COLOR UR: YELLOW
CREAT SERPL-MCNC: 1.21 MG/DL (ref 0.57–1)
CREAT UR-MCNC: 53.9 MG/DL
DEPRECATED RDW RBC AUTO: 49.9 FL (ref 37–54)
EGFRCR SERPLBLD CKD-EPI 2021: 44.3 ML/MIN/1.73
EOSINOPHIL # BLD AUTO: 0.4 10*3/MM3 (ref 0–0.4)
EOSINOPHIL NFR BLD AUTO: 4.6 % (ref 0.3–6.2)
ERYTHROCYTE [DISTWIDTH] IN BLOOD BY AUTOMATED COUNT: 15.5 % (ref 12.3–15.4)
GLUCOSE BLDC GLUCOMTR-MCNC: 102 MG/DL (ref 70–105)
GLUCOSE BLDC GLUCOMTR-MCNC: 119 MG/DL (ref 70–105)
GLUCOSE BLDC GLUCOMTR-MCNC: 122 MG/DL (ref 70–105)
GLUCOSE BLDC GLUCOMTR-MCNC: 145 MG/DL (ref 70–105)
GLUCOSE BLDC GLUCOMTR-MCNC: 147 MG/DL (ref 70–105)
GLUCOSE SERPL-MCNC: 115 MG/DL (ref 65–99)
GLUCOSE UR STRIP-MCNC: NEGATIVE MG/DL
HCT VFR BLD AUTO: 32.2 % (ref 34–46.6)
HGB BLD-MCNC: 10.7 G/DL (ref 12–15.9)
HGB UR QL STRIP.AUTO: ABNORMAL
KETONES UR QL STRIP: NEGATIVE
LEUKOCYTE ESTERASE UR QL STRIP.AUTO: ABNORMAL
LYMPHOCYTES # BLD AUTO: 1 10*3/MM3 (ref 0.7–3.1)
LYMPHOCYTES NFR BLD AUTO: 11.2 % (ref 19.6–45.3)
MCH RBC QN AUTO: 30.3 PG (ref 26.6–33)
MCHC RBC AUTO-ENTMCNC: 33.3 G/DL (ref 31.5–35.7)
MCV RBC AUTO: 91.1 FL (ref 79–97)
MONOCYTES # BLD AUTO: 0.7 10*3/MM3 (ref 0.1–0.9)
MONOCYTES NFR BLD AUTO: 8 % (ref 5–12)
NEUTROPHILS NFR BLD AUTO: 6.7 10*3/MM3 (ref 1.7–7)
NEUTROPHILS NFR BLD AUTO: 75.3 % (ref 42.7–76)
NITRITE UR QL STRIP: NEGATIVE
NRBC BLD AUTO-RTO: 0.1 /100 WBC (ref 0–0.2)
PH UR STRIP.AUTO: 7.5 [PH] (ref 5–8)
PLATELET # BLD AUTO: 251 10*3/MM3 (ref 140–450)
PMV BLD AUTO: 7.4 FL (ref 6–12)
POTASSIUM SERPL-SCNC: 3.7 MMOL/L (ref 3.5–5.2)
PROT UR QL STRIP: ABNORMAL
RBC # BLD AUTO: 3.53 10*6/MM3 (ref 3.77–5.28)
SODIUM SERPL-SCNC: 141 MMOL/L (ref 136–145)
SODIUM UR-SCNC: 164 MMOL/L
SP GR UR STRIP: 1.01 (ref 1–1.03)
UROBILINOGEN UR QL STRIP: ABNORMAL
WBC NRBC COR # BLD: 8.8 10*3/MM3 (ref 3.4–10.8)

## 2022-05-08 PROCEDURE — 94799 UNLISTED PULMONARY SVC/PX: CPT

## 2022-05-08 PROCEDURE — 82570 ASSAY OF URINE CREATININE: CPT | Performed by: INTERNAL MEDICINE

## 2022-05-08 PROCEDURE — 81003 URINALYSIS AUTO W/O SCOPE: CPT | Performed by: INTERNAL MEDICINE

## 2022-05-08 PROCEDURE — 99222 1ST HOSP IP/OBS MODERATE 55: CPT | Performed by: INTERNAL MEDICINE

## 2022-05-08 PROCEDURE — 80048 BASIC METABOLIC PNL TOTAL CA: CPT | Performed by: NURSE PRACTITIONER

## 2022-05-08 PROCEDURE — 99232 SBSQ HOSP IP/OBS MODERATE 35: CPT | Performed by: HOSPITALIST

## 2022-05-08 PROCEDURE — 99231 SBSQ HOSP IP/OBS SF/LOW 25: CPT | Performed by: SURGERY

## 2022-05-08 PROCEDURE — 82962 GLUCOSE BLOOD TEST: CPT

## 2022-05-08 PROCEDURE — 85025 COMPLETE CBC W/AUTO DIFF WBC: CPT | Performed by: NURSE PRACTITIONER

## 2022-05-08 PROCEDURE — 36415 COLL VENOUS BLD VENIPUNCTURE: CPT | Performed by: NURSE PRACTITIONER

## 2022-05-08 PROCEDURE — 94760 N-INVAS EAR/PLS OXIMETRY 1: CPT

## 2022-05-08 PROCEDURE — 84300 ASSAY OF URINE SODIUM: CPT | Performed by: INTERNAL MEDICINE

## 2022-05-08 RX ADMIN — CARBIDOPA AND LEVODOPA 1 TABLET: 25; 100 TABLET ORAL at 10:13

## 2022-05-08 RX ADMIN — PANTOPRAZOLE SODIUM 40 MG: 40 INJECTION, POWDER, FOR SOLUTION INTRAVENOUS at 10:11

## 2022-05-08 RX ADMIN — ASPIRIN 81 MG: 81 TABLET, CHEWABLE ORAL at 10:12

## 2022-05-08 RX ADMIN — NYSTATIN: 100000 POWDER TOPICAL at 23:38

## 2022-05-08 RX ADMIN — Medication 3 ML: at 10:13

## 2022-05-08 RX ADMIN — NYSTATIN: 100000 POWDER TOPICAL at 15:57

## 2022-05-08 RX ADMIN — BUDESONIDE AND FORMOTEROL FUMARATE DIHYDRATE 2 PUFF: 160; 4.5 AEROSOL RESPIRATORY (INHALATION) at 06:58

## 2022-05-08 RX ADMIN — CARBIDOPA AND LEVODOPA 1 TABLET: 25; 100 TABLET ORAL at 23:38

## 2022-05-08 RX ADMIN — ALLOPURINOL 100 MG: 100 TABLET ORAL at 10:12

## 2022-05-08 RX ADMIN — METOPROLOL TARTRATE 50 MG: 50 TABLET, FILM COATED ORAL at 23:38

## 2022-05-08 RX ADMIN — CARBIDOPA AND LEVODOPA 1 TABLET: 25; 100 TABLET ORAL at 15:55

## 2022-05-08 RX ADMIN — NYSTATIN: 100000 POWDER TOPICAL at 10:13

## 2022-05-08 RX ADMIN — METOPROLOL TARTRATE 50 MG: 50 TABLET, FILM COATED ORAL at 10:12

## 2022-05-08 NOTE — PROGRESS NOTES
Name: Nory Duff  Age: 82 y.o.  : 1940  Sex: female    20    Subjective  Feels well     Interval History   No acute events overnight      Objective:    Vital Signs  Temp:  [97.4 °F (36.3 °C)-98.5 °F (36.9 °C)] 98.5 °F (36.9 °C)  Heart Rate:  [61-69] 69  Resp:  [16-19] 18  BP: (130-163)/(70-86) 163/80        Intake/Output Summary (Last 24 hours) at 2022 1211  Last data filed at 2022 0804  Gross per 24 hour   Intake 290 ml   Output 100 ml   Net 190 ml           Physical Exam  Physical Exam       Results Review:      Results from last 7 days   Lab Units 22  2201   SODIUM mmol/L 141 143 142 140   CO2 mmol/L 25.0 29.0 31.0* 32.0*   BUN mg/dL 13 17 21 22   CREATININE mg/dL 1.21* 1.28* 1.34* 1.35*   CALCIUM mg/dL 8.4* 9.0 9.3 9.8   ALBUMIN g/dL  --  2.90*  --  3.60   AST (SGOT) U/L  --  8  --  10   ALT (SGPT) U/L  --  <5  --  <5   EGFR mL/min/1.73 44.3* 41.4* 39.2* 39.1*       Results from last 7 days   Lab Units 22  02222  2201   WBC 10*3/mm3 8.80 8.70 10.80 12.60*       Imaging studies: I personally reviewed the patient's most recent pertinent imaging studies       Medication Review:   allopurinol, 100 mg, Nasogastric, Daily  aspirin, 81 mg, Nasogastric, Daily  budesonide-formoterol, 2 puff, Inhalation, BID - RT  carbidopa-levodopa, 1 tablet, Nasogastric, TID  cefTRIAXone, 1 g, Intravenous, Q24H  insulin lispro, 0-9 Units, Subcutaneous, Q6H  metoprolol tartrate, 50 mg, Nasogastric, BID  nystatin, , Topical, TID  pantoprazole, 40 mg, Intravenous, Q12H  sodium chloride, 3 mL, Intravenous, Q12H  sodium chloride, 3 mL, Intravenous, Q12H          Assessment  Acute kidney injury most likely appears to be secondary to decrease in intravascular volume     Nausea and vomiting  Status post EGD which showed a large complex hiatal hernia and dilation of the fundus     Hypertension     Coronary artery  disease     Urinary tract infection  Patient on ceftriaxone    Plan:  Patient's vital signs are stable clinically looks euvolemic.    Renal function stable creatinine is 1.27 sodium 141 potassium 3.7.    Continue IV fluids decrease rate to 75 cc an hour.      Pradeep Jacob MD  05/08/22  12:11 EDT  Tel: [phone]  Fax: [phone]

## 2022-05-08 NOTE — PLAN OF CARE
Goal Outcome Evaluation:  Plan of Care Reviewed With: patient        Progress: improving  Outcome Evaluation: NGT discontinued on 5/7/2022, tolerating well. pleasant communication with staff earlier this shift. No c/o pain, n/v thus far this shift. Resting in bed with bed side table and call light within reach.

## 2022-05-08 NOTE — PROGRESS NOTES
GENERAL SURGERY PROGRESS NOTE  Chief Complaint:  Surgery Follow up   LOS: 3 days       Subjective     Interval History:     No family at bedside this morning.  Has tolerated liquids.    Objective     Vital Signs  Temp:  [97.4 °F (36.3 °C)-98.4 °F (36.9 °C)] 98.4 °F (36.9 °C)  Heart Rate:  [61-68] 68  Resp:  [16-19] 17  BP: (130-155)/(70-86) 132/80    Physical Exam:   Abdomen soft  Labs:  Lab Results (last 24 hours)     Procedure Component Value Units Date/Time    POC Glucose Once [[id number]]  (Abnormal) Collected: 05/08/22 0801    Specimen: Blood Updated: 05/08/22 0802     Glucose 119 mg/dL      Comment: Serial Number: [id number]:  [id number]       POC Glucose Once [[id number]]  (Abnormal) Collected: 05/08/22 0548    Specimen: Blood Updated: 05/08/22 0549     Glucose 122 mg/dL      Comment: Serial Number: [id number]:  [id number]       Basic Metabolic Panel [[id number]]  (Abnormal) Collected: 05/08/22 0219    Specimen: Blood Updated: 05/08/22 0329     Glucose 115 mg/dL      BUN 13 mg/dL      Creatinine 1.21 mg/dL      Sodium 141 mmol/L      Potassium 3.7 mmol/L      Chloride 103 mmol/L      CO2 25.0 mmol/L      Calcium 8.4 mg/dL      BUN/Creatinine Ratio 10.7     Anion Gap 13.0 mmol/L      eGFR 44.3 mL/min/1.73      Comment: National Kidney Foundation and American Society of Nephrology (ASN) Task Force recommended calculation based on the Chronic Kidney Disease Epidemiology Collaboration (CKD-EPI) equation refit without adjustment for race.       Narrative:      GFR Normal >60  Chronic Kidney Disease <60  Kidney Failure <15      CBC & Differential [[id number]]  (Abnormal) Collected: 05/08/22 0220    Specimen: Blood Updated: 05/08/22 0308    Narrative:      The following orders were created for panel order CBC & Differential.  Procedure                               Abnormality         Status                     ---------                               -----------         ------                     CBC Auto  Differential[[id number]]        Abnormal            Final result                 Please view results for these tests on the individual orders.    CBC Auto Differential [[id number]]  (Abnormal) Collected: 05/08/22 0220    Specimen: Blood Updated: 05/08/22 0308     WBC 8.80 10*3/mm3      RBC 3.53 10*6/mm3      Hemoglobin 10.7 g/dL      Hematocrit 32.2 %      MCV 91.1 fL      MCH 30.3 pg      MCHC 33.3 g/dL      RDW 15.5 %      RDW-SD 49.9 fl      MPV 7.4 fL      Platelets 251 10*3/mm3      Neutrophil % 75.3 %      Lymphocyte % 11.2 %      Monocyte % 8.0 %      Eosinophil % 4.6 %      Basophil % 0.9 %      Neutrophils, Absolute 6.70 10*3/mm3      Lymphocytes, Absolute 1.00 10*3/mm3      Monocytes, Absolute 0.70 10*3/mm3      Eosinophils, Absolute 0.40 10*3/mm3      Basophils, Absolute 0.10 10*3/mm3      nRBC 0.1 /100 WBC     POC Glucose Once [[id number]]  (Normal) Collected: 05/07/22 1709    Specimen: Blood Updated: 05/07/22 1710     Glucose 91 mg/dL      Comment: Serial Number: [id number]:  [id number]       POC Glucose Once [[id number]]  (Normal) Collected: 05/07/22 1306    Specimen: Blood Updated: 05/07/22 1307     Glucose 103 mg/dL      Comment: Serial Number: [id number]:  [id number]              Results Review:     Labs and imaging for today were reviewed.    Assessment/Plan     Nory Duff is a 82 y.o. female who has a large hiatal hernia with apparent recent gastric obstruction.      Currently tolerating liquids.  I would like to avoid operating on her if possible as I believe that she is a poor operative candidate.  Would go ahead with cardiac evaluation given that she may ultimately require surgery.  Unfortunately, no family is available to discuss further today.    Continue liquids for now may advance to full's if continues to tolerate clears.          This document has been electronically signed by Sameer Lai MD on May 8, 2022 11:43 EDT        Sameer Lai,  MD  05/08/22  11:43 EDT

## 2022-05-08 NOTE — PROGRESS NOTES
[facility] Medicine Services Daily Progress Note    Patient Name: Nory Duff  : 1940  MRN: [id number]  Primary Care Physician:  Giovani Mejia MD  Date of admission: 2022      Subjective      Chief Complaint: Nausea vomiting    No changes, attempting to advance diet so far doing okay.    Review of systems  GI positive for nausea positive for vomiting  Pulmonary no shortness of breath no cough  Cardiac no chest pain or palpitation      Objective      Vitals:   Temp:  [97.4 °F (36.3 °C)-98.4 °F (36.9 °C)] 98.4 °F (36.9 °C)  Heart Rate:  [61-68] 68  Resp:  [16-19] 17  BP: (130-155)/(70-86) 132/80  Flow (L/min):  [2] 2    Physical Exam   Constitutional: AAO x1 to self.  HENT:   Head: Normocephalic and atraumatic.   Eyes: Conjunctivae and EOM are normal. Pupils are equal, round, and reactive to light.   Neck: No JVD present. No thyromegaly present.   Cardiovascular: Normal rate, regular rhythm, normal heart sounds and intact distal pulses. Exam reveals no gallop and no friction rub.   No murmur heard.  Pulmonary/Chest: Effort normal and breath sounds normal. No stridor. No respiratory distress.  has no wheezes.  has no rales.  exhibits no tenderness.   Abdominal: Soft. Bowel sounds are normal.  no distension and no mass. There is no tenderness. There is no rebound and no guarding. No hernia.   Musculoskeletal: Normal range of motion.   Lymphadenopathy:     no cervical adenopathy.   Neurological:  alert . No cranial nerve deficit or sensory deficit. exhibits normal muscle tone.   Skin: No rash noted.  not diaphoretic.   Psychiatric:  normal mood and affect.   Vitals reviewed.         Result Review    Result Review:  I have personally reviewed the results from the time of this admission to 2022 09:14 EDT and agree with these findings:  [x]  Laboratory  []  Microbiology  [x]  Radiology  []  EKG/Telemetry   []  Cardiology/Vascular   []  Pathology  []  Old records  []   Other:          Assessment/Plan      Brief Patient Summary:  Nory Duff is a 82 y.o. female who presents with nausea vomiting.      allopurinol, 100 mg, Nasogastric, Daily  aspirin, 81 mg, Nasogastric, Daily  budesonide-formoterol, 2 puff, Inhalation, BID - RT  carbidopa-levodopa, 1 tablet, Nasogastric, TID  cefTRIAXone, 1 g, Intravenous, Q24H  insulin lispro, 0-9 Units, Subcutaneous, Q6H  metoprolol tartrate, 50 mg, Nasogastric, BID  nystatin, , Topical, TID  pantoprazole, 40 mg, Intravenous, Q12H  sodium chloride, 3 mL, Intravenous, Q12H  sodium chloride, 3 mL, Intravenous, Q12H       sodium chloride, 125 mL/hr, Last Rate: 125 mL/hr (05/06/22 1311)         Active Hospital Problems:  Active Hospital Problems    Diagnosis    • COPD (chronic obstructive pulmonary disease) ([facility])    • Nausea and vomiting, unspecified vomiting type    • Acute UTI (urinary tract infection)    • GERD (gastroesophageal reflux disease)    • Hypertension    • Dementia ([facility])    • Atherosclerotic heart disease of native coronary artery without angina pectoris    • Gout    • Parkinson's disease ([facility])    • CKD (chronic kidney disease) stage 4, GFR 15-29 ml/min ([facility])    • Hiatal hernia    • Hyperlipidemia    • Type 2 diabetes mellitus without complication, with long-term current use of insulin ([facility])      Plan:   Nausea vomiting  Resolved, clear liquid so far doing okay with that advance per general surgery  Status post EGD found to have gastric obstruction volvulus type  General surgery consult likely nonoperative management if possible  NG out  IV fluids  General surgery on board if tolerates diet may be able to treated conservatively if fails probably will need surgery, sounds like she will need cardiac clearance prior to that.    COPD  Not exacerbation  Nebs as needed  Continue Symbicort    UTI  Continue ceftriaxone  Follow-up on micro gram-negative bacilli grown so far in the urine.    IZABELLA  Hold Lasix  IV fluids  Nephro  following    GERD  Continue PPI    Hypertension  Continue metoprolol    CAD  Continue Plavix aspirin beta-blocker    Parkinson's disease  Continue Sinemet    Diabetes  Continue suicide skin Accu-Cheks every 6 until reliably eating  Hold off on Lantus for now    DVT PUD prophylaxis     Plan as above      DVT prophylaxis:  Mechanical DVT prophylaxis orders are present.    CODE STATUS:    Code Status (Patient has no pulse and is not breathing): No CPR (Do Not Attempt to Resuscitate)  Medical Interventions (Patient has pulse or is breathing): Full Support  Comments: document on chart from nursing home      Disposition:  I expect patient to be discharged when clinically indicated.      Electronically signed by Serge Patel MD, 05/08/22, 09:14 EDT.  [facility] [place] Hospitalist Team            Oriented - self; Oriented - place; Oriented - time

## 2022-05-08 NOTE — DISCHARGE PLACEMENT REQUEST
"Nory Mckenzie (82 y.o. Female)             Date of Birth   1940    Social Security Number       Address   [address] [place] [address]    Home Phone   [phone]    MRN   [id number]       [facility]    Marital Status                               Admission Date   5/5/22    Admission Type   Emergency    Admitting Provider   Serge Patel MD    Attending Provider   Serge Patel MD    Department, Room/Bed   [facility] SURGICAL INPATIENT, 4131/1       Discharge Date       Discharge Disposition       Discharge Destination                               Attending Provider: Serge Patel MD    Allergies: Codeine, Latex, Namenda [Memantine]    Isolation: None   Infection: None   Code Status: No CPR   Advance Care Planning Activity    Ht: 160 cm (62.99\")   Wt: 82.3 kg (181 lb 7 oz)    Admission Cmt: None   Principal Problem: None                Active Insurance as of 5/5/2022     Primary Coverage     Payor Plan Insurance Group Employer/Plan Group    [facility] MEDICARE REPLACEMENT [facility] MEDICARE REPLACEMENT [id number]     Payor Plan Address Payor Plan Phone Number Payor Plan Fax Number Effective Dates    [address] [address]   1/1/2021 - None Entered    [facility] [id number]       Subscriber Name Subscriber Birth Date Member ID       NORY MCKENZIE 1940 [id number]           Secondary Coverage     Payor Plan Insurance Group Employer/Plan Group    [place]      Payor Plan Address Payor Plan Phone Number Payor Plan Fax Number Effective Dates    [address]   1/1/2020 - None Entered    [place] [address]       Subscriber Name Subscriber Birth Date Member ID       NORY MCKENZIE 1940 [id number]                 Emergency Contacts      (Rel.) Home Phone Work Phone Mobile Phone    EVERT OLIVEIRA (Power of ) -- -- [phone]               History & Physical      Essing-Myah Morales, " "APRN at [phone]              [facility] Medicine Services      Patient Name: Nory Duff  : 1940  MRN: [id number]  Primary Care Physician:  Giovani Mejia MD  Date of admission: 2022      Subjective      Chief Complaint: reported nausea and vomiting     History of Present Illness: Nory Duff is a 82 y.o. female who presented to [facility] on 2022 from an extended care facility for reports of nausea and vomiting 3 times today. She is awake and alert and answers appropriately but a poor historian for details. When asked why she was sent here she replied\" that's what I would like to know\". She denies any abdominal pain. She reports she did have nausea and vomiting prior to arrival at hospital, Poor historian for details. She denies fever, dyspnea, chest pain or chills. She reports dysuria \" sometimes\". She cannot say whether she has had hematemesis, melena or hematochezia. PMH includes hiatel hernia, CAD, COPD with no home oxygen use, dementia, Parkinson's disease, hyperlipidemia, hypertension, GERD diabetes mellitus type 2 on insulin and anemia.    Labs today show a urinalysis with moderate leukocytes, 6-12 WBCs and 4+ bacteria, COVID-19 negative, glucose 166, creatinine 1.35 serum WBC 12.6.  CT abdomen and pelvis per radiology:    \"\"IMPRESSION:  1.There is a large hiatal hernia with the gastric body and antrum above  the diaphragm. There is significant wall thickening of the esophagus.  The gastroesophageal junction is at the diaphragmatic hiatus and there  is distention of the excluded fundus. Obstruction at the hernia site is  suspected.  2.No free air or free fluid.  3.Mildly comminuted diverticulitis in the descending colon.  4.Status post hysterectomy.  \"    Review of records shows that she was admitted here in 2021 with an upper GI bleed and EGD done at that time showed a very large hiatal hernia with no active bleeding " discovered.  She also had a UTI at that time as well.  She was also admitted here in December 2021 with acute respiratory failure secondary to COPD exacerbation and elevated proBNP.  She denies any dyspnea today.  NG tube to low intermittent wall suction was placed in ED for decompression, she will be n.p.o., IV fluid hydration, she was started on 1 g IV ceftriaxone for UTI and gastroenterology has been consulted.  She will be admitted for further evaluation and treatment.    Per nursing home documentation patient has a DNR order.      Review of Systems   Unable to perform ROS: dementia       Personal History     Past Medical History:   Diagnosis Date   • Anemia    • Atherosclerotic heart disease of native coronary artery without angina pectoris    • COPD (chronic obstructive pulmonary disease) (HCC)    • COVID-19 03/2020   • Dementia (HCC)    • Diabetes mellitus (HCC)    • GERD (gastroesophageal reflux disease)    • Gout    • Hyperlipidemia    • Hypertension    • Localized edema    • Parkinson's disease (HCC)    • Vitamin D deficiency        Past Surgical History:   Procedure Laterality Date   • ENDOSCOPY N/A 1/2/2021    Procedure: ESOPHAGOGASTRODUODENOSCOPY;  Surgeon: Yoan Mendoza MD;  Location: [facility] ENDOSCOPY;  Service: Gastroenterology;  Laterality: N/A;  post: esophageal trauma from nasogastric tube, large hiatal hernia       Family History: Family history is unknown by patient. Otherwise pertinent FHx was reviewed and not pertinent to current issue.    Social History:  reports that she has never smoked. She has never used smokeless tobacco. She reports that she does not drink alcohol and does not use drugs.    Home Medications:  Prior to Admission Medications     Prescriptions Last Dose Informant Patient Reported? Taking?    acetaminophen (TYLENOL) 325 MG tablet  Nursing Home Yes No    Take 650 mg by mouth Every 6 (Six) Hours As Needed for Mild Pain  or Fever.    albuterol (PROVENTIL) (2.5 MG/3ML) 0.083%  nebulizer solution   No No    Take 2.5 mg by nebulization Every 4 (Four) Hours As Needed for Wheezing.    allopurinol (ZYLOPRIM) 100 MG tablet  Nursing Home Yes No    Take 200 mg by mouth Daily.    aspirin 81 MG EC tablet  Nursing Home Yes No    Take 81 mg by mouth Daily.    budesonide-formoterol (Symbicort) 160-4.5 MCG/ACT inhaler   No No    Inhale 2 puffs 2 (Two) Times a Day.    carbidopa-levodopa (SINEMET)  MG per tablet  Nursing Home Yes No    Take 1 tablet by mouth 3 (Three) Times a Day.    clopidogrel (PLAVIX) 75 MG tablet  Nursing Home Yes No    Take 75 mg by mouth Daily.    furosemide (LASIX) 20 MG tablet   No No    Take 1 tablet by mouth 2 (Two) Times a Day. q day x 7 days, check bmp in one week. Then increase to BID    Glucagon, rDNA, (Glucagon Emergency) 1 MG kit  Nursing Home Yes No    Inject  as directed As Needed.    insulin glargine (LANTUS, SEMGLEE) 100 UNIT/ML injection   No No    Inject 20 Units under the skin into the appropriate area as directed Daily.    ipratropium-albuterol (DUO-NEB) 0.5-2.5 mg/3 ml nebulizer   No No    Take 3 mL by nebulization 4 (Four) Times a Day.    magnesium hydroxide (MILK OF MAGNESIA) 400 MG/5ML suspension  Nursing Home Yes No    Take 30 mL by mouth Daily As Needed for Constipation.    metoprolol tartrate (LOPRESSOR) 50 MG tablet  Nursing Home Yes No    Take 50 mg by mouth 2 (Two) Times a Day.    nitroglycerin (NITROSTAT) 0.4 MG SL tablet   No No    Place 1 tablet under the tongue Every 5 (Five) Minutes As Needed for Chest Pain (Only if SBP Greater Than 100). Take no more than 3 doses in 15 minutes.    nystatin (MYCOSTATIN) [id number] UNIT/GM powder  Nursing Home Yes No    Apply  topically to the appropriate area as directed 3 (Three) Times a Day. Under breasts/ abd folds    Omega-3 Fatty Acids (fish oil) 1000 MG capsule capsule   Yes No    Take 1,000 mg by mouth Daily With Breakfast.    pantoprazole (PROTONIX) 40 MG EC tablet   No No    Take 1 tablet by mouth Daily.     predniSONE (DELTASONE) 20 MG tablet   No No    Take 2 tablets by mouth Daily With Breakfast. 40 mg p.o. daily x3 days, 30 mg p.o. daily x3 days, 20 mg p.o. daily x3 days, 10 mg p.o. daily x3 days,    selenium sulfide (SELSUN) 1 % lotion   Yes No    Apply  topically to the appropriate area as directed Every 7 (Seven) Days. Tuesday            Allergies:  Allergies   Allergen Reactions   • Codeine Anaphylaxis   • Latex Anaphylaxis   • Namenda [Memantine] Anaphylaxis       Objective      Vitals:   Temp:  [98.7 °F (37.1 °C)] 98.7 °F (37.1 °C)  Heart Rate:  [62-74] 74  Resp:  [15-19] 17  BP: (124-169)/(68-70) 169/70  Flow (L/min):  [2] 2    Physical Exam  Vitals reviewed.   Constitutional:       Appearance: Normal appearance. She is obese.   HENT:      Head: Normocephalic and atraumatic.      Right Ear: External ear normal.      Left Ear: External ear normal.      Nose: Nose normal.      Mouth/Throat:      Mouth: Mucous membranes are moist.   Eyes:      Extraocular Movements: Extraocular movements intact.   Cardiovascular:      Rate and Rhythm: Normal rate and regular rhythm.      Pulses: Normal pulses.      Heart sounds: Normal heart sounds.   Pulmonary:      Effort: Pulmonary effort is normal.      Breath sounds: Normal breath sounds.   Abdominal:      Palpations: Abdomen is soft.   Genitourinary:     Comments: deferred  Musculoskeletal:         General: Normal range of motion.      Cervical back: Normal range of motion and neck supple.   Skin:     General: Skin is warm and dry.   Neurological:      General: No focal deficit present.      Mental Status: She is alert.      Comments: Oriented to self and place    Psychiatric:         Mood and Affect: Mood normal.         Behavior: Behavior normal.      Comments: Cooperative, poor historian          Result Review    Result Review:  I have personally reviewed the results from the time of this admission to 5/6/2022 00:18 EDT and agree with these findings:  [x]   "Laboratory  [x]  Microbiology  [x]  Radiology  []  EKG/Telemetry   []  Cardiology/Vascular   []  Pathology  [x]  Old records  []  Other:  Most notable findings include: Urinalysis showing moderate leukocytes 6-12 WBCs 4+ bacteria COVID-19 negative, glucose 166, creatinine 1.35, WBC 12.6  CT abdomen per radiology:    \"IMPRESSION:  1.There is a large hiatal hernia with the gastric body and antrum above  the diaphragm. There is significant wall thickening of the esophagus.  The gastroesophageal junction is at the diaphragmatic hiatus and there  is distention of the excluded fundus. Obstruction at the hernia site is  suspected.  2.No free air or free fluid.  3.Mildly comminuted diverticulitis in the descending colon.  4.Status post hysterectomy.    Assessment/Plan        Active Hospital Problems:  Active Hospital Problems    Diagnosis    • COPD (chronic obstructive pulmonary disease) ([facility])    • Nausea and vomiting, unspecified vomiting type    • Acute UTI (urinary tract infection)    • GERD (gastroesophageal reflux disease)    • Hypertension    • Dementia ([facility])    • Atherosclerotic heart disease of native coronary artery without angina pectoris    • Gout    • Parkinson's disease ([facility])    • CKD (chronic kidney disease) stage 4, GFR 15-29 ml/min ([facility])    • Hiatal hernia    • Hyperlipidemia    • Type 2 diabetes mellitus without complication, with long-term current use of insulin ([facility])      Plan:    Nausea and vomiting per CT may be secondary to large hiatal hernia and possible thickening of the esophagus possible obstruction at the hernia site, continue NG tube to intermittent low wall suction n.p.o., IV fluid hydration 4 mg Zofran every 6 hours as needed nausea gastroenterology consulted    Acute UTI, 3+ bacteria positive leukocytes, continue 1 g IV ceftriaxone until urine culture resulted      GERD, received 80 mg Protonix IV, continue 40 mg IV Protonix daily    COPD, stable on room air 97% no home oxygen use DuoNebs " every 4 hours as needed shortness of air wheezing    Hypertension, home meds unverified at this time reorder pending verification pharmacy monitor BP add as needed IV hydralazine if needed    Dementia, home meds unverified at this time reorder pending verification pharmacy    Coronary artery disease, stable no chest pain Home meds unverified at this time reorder pending verification pharmacy    Parkinson's disease, home meds unverified at this time reorder pending verification pharmacy    Gout, home meds unverified at this time reorder pending verification pharmacy    Chronic kidney disease stage IV per records creatinine today 1.35 improved from previous of 2.4-1.75 continue to trend renal function avoid nephrotoxic meds as much as possible    Diabetes mellitus type 2 insulin-dependent, serum glucose 166, home meds unverified at this time reorder pending verification pharmacy add SSI as needed with Accu-Cheks AC at bedtime old med list shows patient was on prednisone unclear if this is still true            DVT prophylaxis:  Mechanical DVT prophylaxis orders are present.    CODE STATUS:    Code Status (Patient has no pulse and is not breathing): No CPR (Do Not Attempt to Resuscitate)  Medical Interventions (Patient has pulse or is breathing): Full Support  Comments: document on chart from nursing home    Admission Status:  I believe this patient meets inpatient  status.    I discussed the patient's findings and my recommendations with patient.    This patient has been examined wearing appropriate Personal Protective Equipment . 05/06/22      Signature: Electronically signed by MAE Quiñones, 05/06/22, 12:21 AM EDT.    Electronically signed by Myah Haddad APRN at 05/06/22 0023

## 2022-05-08 NOTE — PLAN OF CARE
Goal Outcome Evaluation:  Plan of Care Reviewed With: patient        Progress: improving  Outcome Evaluation: Patient confused, wanting to get out of bed.  Believes that she is at the doctors office and needs to get home before  gets off work.  Reoriented patient several times.

## 2022-05-09 ENCOUNTER — APPOINTMENT (OUTPATIENT)
Dept: GENERAL RADIOLOGY | Facility: HOSPITAL | Age: 82
End: 2022-05-09

## 2022-05-09 ENCOUNTER — APPOINTMENT (OUTPATIENT)
Dept: CARDIOLOGY | Facility: HOSPITAL | Age: 82
End: 2022-05-09

## 2022-05-09 LAB
ALBUMIN SERPL-MCNC: 3 G/DL (ref 3.5–5.2)
ALBUMIN/GLOB SERPL: 1 G/DL
ALP SERPL-CCNC: 81 U/L (ref 39–117)
ALT SERPL W P-5'-P-CCNC: <5 U/L (ref 1–33)
ANION GAP SERPL CALCULATED.3IONS-SCNC: 10 MMOL/L (ref 5–15)
AST SERPL-CCNC: 8 U/L (ref 1–32)
BH CV ECHO MEAS - ACS: 1.72 CM
BH CV ECHO MEAS - AO MAX PG: 4.3 MMHG
BH CV ECHO MEAS - AO MEAN PG: 2.26 MMHG
BH CV ECHO MEAS - AO ROOT DIAM: 3.3 CM
BH CV ECHO MEAS - AO V2 MAX: 103.6 CM/SEC
BH CV ECHO MEAS - AO V2 VTI: 26.7 CM
BH CV ECHO MEAS - AVA(I,D): 2.4 CM2
BH CV ECHO MEAS - EDV(CUBED): 85.2 ML
BH CV ECHO MEAS - EDV(MOD-SP4): 63.3 ML
BH CV ECHO MEAS - EF(MOD-SP4): 70.9 %
BH CV ECHO MEAS - ESV(CUBED): 13.3 ML
BH CV ECHO MEAS - ESV(MOD-SP4): 18.4 ML
BH CV ECHO MEAS - FS: 46.1 %
BH CV ECHO MEAS - IVS/LVPW: 1.1 CM
BH CV ECHO MEAS - IVSD: 1 CM
BH CV ECHO MEAS - LA DIMENSION(2D): 3.3 CM
BH CV ECHO MEAS - LV DIASTOLIC VOL/BSA (35-75): 34.1 CM2
BH CV ECHO MEAS - LV MASS(C)D: 138.5 GRAMS
BH CV ECHO MEAS - LV MAX PG: 2.47 MMHG
BH CV ECHO MEAS - LV MEAN PG: 1.4 MMHG
BH CV ECHO MEAS - LV SYSTOLIC VOL/BSA (12-30): 9.9 CM2
BH CV ECHO MEAS - LV V1 MAX: 78.6 CM/SEC
BH CV ECHO MEAS - LV V1 VTI: 18.4 CM
BH CV ECHO MEAS - LVIDD: 4.4 CM
BH CV ECHO MEAS - LVIDS: 2.37 CM
BH CV ECHO MEAS - LVOT AREA: 3.5 CM2
BH CV ECHO MEAS - LVOT DIAM: 2.11 CM
BH CV ECHO MEAS - LVPWD: 0.91 CM
BH CV ECHO MEAS - MV A MAX VEL: 89.5 CM/SEC
BH CV ECHO MEAS - MV DEC SLOPE: 281.2 CM/SEC2
BH CV ECHO MEAS - MV DEC TIME: 0.28 MSEC
BH CV ECHO MEAS - MV E MAX VEL: 77.4 CM/SEC
BH CV ECHO MEAS - MV E/A: 0.86
BH CV ECHO MEAS - MV MAX PG: 6.6 MMHG
BH CV ECHO MEAS - MV MEAN PG: 1.73 MMHG
BH CV ECHO MEAS - MV V2 VTI: 37 CM
BH CV ECHO MEAS - MVA(VTI): 1.74 CM2
BH CV ECHO MEAS - PA ACC TIME: 0.09 SEC
BH CV ECHO MEAS - PA PR(ACCEL): 40.1 MMHG
BH CV ECHO MEAS - PA V2 MAX: 69.7 CM/SEC
BH CV ECHO MEAS - RAP SYSTOLE: 3 MMHG
BH CV ECHO MEAS - RV MAX PG: 1.43 MMHG
BH CV ECHO MEAS - RV V1 MAX: 59.7 CM/SEC
BH CV ECHO MEAS - RV V1 VTI: 11.9 CM
BH CV ECHO MEAS - RVDD: 2.22 CM
BH CV ECHO MEAS - RVSP: 39.8 MMHG
BH CV ECHO MEAS - SI(MOD-SP4): 24.1 ML/M2
BH CV ECHO MEAS - SV(LVOT): 64.3 ML
BH CV ECHO MEAS - SV(MOD-SP4): 44.9 ML
BH CV ECHO MEAS - TR MAX PG: 36.8 MMHG
BH CV ECHO MEAS - TR MAX VEL: 302.8 CM/SEC
BILIRUB SERPL-MCNC: 0.4 MG/DL (ref 0–1.2)
BUN SERPL-MCNC: 10 MG/DL (ref 8–23)
BUN/CREAT SERPL: 9.5 (ref 7–25)
CALCIUM SPEC-SCNC: 8.7 MG/DL (ref 8.6–10.5)
CHLORIDE SERPL-SCNC: 103 MMOL/L (ref 98–107)
CO2 SERPL-SCNC: 26 MMOL/L (ref 22–29)
CREAT SERPL-MCNC: 1.05 MG/DL (ref 0.57–1)
DEPRECATED RDW RBC AUTO: 49.9 FL (ref 37–54)
EGFRCR SERPLBLD CKD-EPI 2021: 52.5 ML/MIN/1.73
ERYTHROCYTE [DISTWIDTH] IN BLOOD BY AUTOMATED COUNT: 15.5 % (ref 12.3–15.4)
GLOBULIN UR ELPH-MCNC: 2.9 GM/DL
GLUCOSE BLDC GLUCOMTR-MCNC: 112 MG/DL (ref 70–105)
GLUCOSE BLDC GLUCOMTR-MCNC: 137 MG/DL (ref 70–105)
GLUCOSE BLDC GLUCOMTR-MCNC: 147 MG/DL (ref 70–105)
GLUCOSE BLDC GLUCOMTR-MCNC: 154 MG/DL (ref 70–105)
GLUCOSE SERPL-MCNC: 107 MG/DL (ref 65–99)
HCT VFR BLD AUTO: 31.9 % (ref 34–46.6)
HGB BLD-MCNC: 10.5 G/DL (ref 12–15.9)
MAXIMAL PREDICTED HEART RATE: 136 BPM
MCH RBC QN AUTO: 30 PG (ref 26.6–33)
MCHC RBC AUTO-ENTMCNC: 33.1 G/DL (ref 31.5–35.7)
MCV RBC AUTO: 90.8 FL (ref 79–97)
PLATELET # BLD AUTO: 251 10*3/MM3 (ref 140–450)
PMV BLD AUTO: 7.7 FL (ref 6–12)
POTASSIUM SERPL-SCNC: 3.6 MMOL/L (ref 3.5–5.2)
PROT SERPL-MCNC: 5.9 G/DL (ref 6–8.5)
RBC # BLD AUTO: 3.51 10*6/MM3 (ref 3.77–5.28)
SODIUM SERPL-SCNC: 139 MMOL/L (ref 136–145)
STRESS TARGET HR: 116 BPM
WBC NRBC COR # BLD: 8.2 10*3/MM3 (ref 3.4–10.8)

## 2022-05-09 PROCEDURE — 94761 N-INVAS EAR/PLS OXIMETRY MLT: CPT

## 2022-05-09 PROCEDURE — 63710000001 INSULIN LISPRO (HUMAN) PER 5 UNITS: Performed by: HOSPITALIST

## 2022-05-09 PROCEDURE — 94760 N-INVAS EAR/PLS OXIMETRY 1: CPT

## 2022-05-09 PROCEDURE — 82962 GLUCOSE BLOOD TEST: CPT

## 2022-05-09 PROCEDURE — 85027 COMPLETE CBC AUTOMATED: CPT | Performed by: HOSPITALIST

## 2022-05-09 PROCEDURE — 99232 SBSQ HOSP IP/OBS MODERATE 35: CPT | Performed by: INTERNAL MEDICINE

## 2022-05-09 PROCEDURE — 93306 TTE W/DOPPLER COMPLETE: CPT

## 2022-05-09 PROCEDURE — 94799 UNLISTED PULMONARY SVC/PX: CPT

## 2022-05-09 PROCEDURE — 93306 TTE W/DOPPLER COMPLETE: CPT | Performed by: INTERNAL MEDICINE

## 2022-05-09 PROCEDURE — 80053 COMPREHEN METABOLIC PANEL: CPT | Performed by: HOSPITALIST

## 2022-05-09 PROCEDURE — 74018 RADEX ABDOMEN 1 VIEW: CPT

## 2022-05-09 PROCEDURE — 99232 SBSQ HOSP IP/OBS MODERATE 35: CPT | Performed by: HOSPITALIST

## 2022-05-09 PROCEDURE — 94664 DEMO&/EVAL PT USE INHALER: CPT

## 2022-05-09 RX ORDER — METOPROLOL TARTRATE 50 MG/1
50 TABLET, FILM COATED ORAL 2 TIMES DAILY
Status: DISCONTINUED | OUTPATIENT
Start: 2022-05-09 | End: 2022-05-10 | Stop reason: HOSPADM

## 2022-05-09 RX ORDER — ASPIRIN 81 MG/1
81 TABLET, CHEWABLE ORAL DAILY
Status: DISCONTINUED | OUTPATIENT
Start: 2022-05-10 | End: 2022-05-10 | Stop reason: HOSPADM

## 2022-05-09 RX ORDER — PANTOPRAZOLE SODIUM 40 MG/1
40 TABLET, DELAYED RELEASE ORAL
Status: DISCONTINUED | OUTPATIENT
Start: 2022-05-09 | End: 2022-05-10 | Stop reason: HOSPADM

## 2022-05-09 RX ORDER — CEFDINIR 300 MG/1
300 CAPSULE ORAL EVERY 12 HOURS SCHEDULED
Status: DISCONTINUED | OUTPATIENT
Start: 2022-05-09 | End: 2022-05-09

## 2022-05-09 RX ORDER — CEFDINIR 300 MG/1
300 CAPSULE ORAL EVERY 12 HOURS SCHEDULED
Status: DISCONTINUED | OUTPATIENT
Start: 2022-05-09 | End: 2022-05-10 | Stop reason: HOSPADM

## 2022-05-09 RX ORDER — ALLOPURINOL 100 MG/1
100 TABLET ORAL DAILY
Status: DISCONTINUED | OUTPATIENT
Start: 2022-05-10 | End: 2022-05-10 | Stop reason: HOSPADM

## 2022-05-09 RX ADMIN — BUDESONIDE AND FORMOTEROL FUMARATE DIHYDRATE 2 PUFF: 160; 4.5 AEROSOL RESPIRATORY (INHALATION) at 19:09

## 2022-05-09 RX ADMIN — NYSTATIN: 100000 POWDER TOPICAL at 17:12

## 2022-05-09 RX ADMIN — ALLOPURINOL 100 MG: 100 TABLET ORAL at 08:22

## 2022-05-09 RX ADMIN — BUDESONIDE AND FORMOTEROL FUMARATE DIHYDRATE 2 PUFF: 160; 4.5 AEROSOL RESPIRATORY (INHALATION) at 08:17

## 2022-05-09 RX ADMIN — PANTOPRAZOLE SODIUM 40 MG: 40 TABLET, DELAYED RELEASE ORAL at 17:12

## 2022-05-09 RX ADMIN — NYSTATIN: 100000 POWDER TOPICAL at 23:33

## 2022-05-09 RX ADMIN — CARBIDOPA AND LEVODOPA 1 TABLET: 25; 100 TABLET ORAL at 08:22

## 2022-05-09 RX ADMIN — CEFDINIR 300 MG: 300 CAPSULE ORAL at 13:05

## 2022-05-09 RX ADMIN — CARBIDOPA AND LEVODOPA 1 TABLET: 25; 100 TABLET ORAL at 23:33

## 2022-05-09 RX ADMIN — PANTOPRAZOLE SODIUM 40 MG: 40 TABLET, DELAYED RELEASE ORAL at 10:10

## 2022-05-09 RX ADMIN — NYSTATIN: 100000 POWDER TOPICAL at 08:23

## 2022-05-09 RX ADMIN — INSULIN LISPRO 2 UNITS: 100 INJECTION, SOLUTION INTRAVENOUS; SUBCUTANEOUS at 12:29

## 2022-05-09 RX ADMIN — ASPIRIN 81 MG: 81 TABLET, CHEWABLE ORAL at 08:22

## 2022-05-09 RX ADMIN — CEFDINIR 300 MG: 300 CAPSULE ORAL at 23:42

## 2022-05-09 RX ADMIN — METOPROLOL TARTRATE 50 MG: 50 TABLET, FILM COATED ORAL at 08:22

## 2022-05-09 RX ADMIN — CARBIDOPA AND LEVODOPA 1 TABLET: 25; 100 TABLET ORAL at 17:12

## 2022-05-09 NOTE — CASE MANAGEMENT/SOCIAL WORK
Continued Stay Note  ROBERT Fowler     Patient Name: Nory Duff  MRN: [id number]  Today's Date: 5/9/2022    Admit Date: 5/5/2022     Discharge Plan     Row Name 05/09/22 1604       Plan    Plan LTC at [facility] and [facility]. OK to retrun per family and facility    Plan Comments Received message from rep Mary Kate from [facility] that patient is LTC and may return.            Met with patient in room wearing PPE: mask, face shield/goggles, gloves, gown.      Maintained distance greater than six feet and spent less than 15 minutes in the room.          Jennifer Man RN

## 2022-05-09 NOTE — PROGRESS NOTES
Name: Nory Duff  Age: 82 y.o.  : 1940  Sex: female    22    Subjective  Feels well       Objective:    Vital Signs  Temp:  [98.3 °F (36.8 °C)-98.6 °F (37 °C)] 98.6 °F (37 °C)  Heart Rate:  [64-82] 82  Resp:  [16-20] 20  BP: (154-163)/(66-80) 160/76      No intake or output data in the 24 hours ending 22 09      Physical Exam  GEN: Awake, NAD  ENT: PERRL, EOMI, MMM  NECK: Supple, no JVD  CHEST: CTAB, no W/R/C  CV: RRR, no M/G/R  ABD: Soft, NT, +BS  SKIN: Warm and Dry  NEURO: CN's intact         Results Review:      Results from last 7 days   Lab Units 22  01322  2201   SODIUM mmol/L 139 141 143 142 140   CO2 mmol/L 26.0 25.0 29.0 31.0* 32.0*   BUN mg/dL 10 13 17 21 22   CREATININE mg/dL 1.05* 1.21* 1.28* 1.34* 1.35*   CALCIUM mg/dL 8.7 8.4* 9.0 9.3 9.8   ALBUMIN g/dL 3.00*  --  2.90*  --  3.60   AST (SGOT) U/L 8  --  8  --  10   ALT (SGPT) U/L <5  --  <5  --  <5   EGFR mL/min/1.73 52.5* 44.3* 41.4* 39.2* 39.1*       Results from last 7 days   Lab Units 22  0137 22  02222  2201   WBC 10*3/mm3 8.20 8.80 8.70 10.80 12.60*         Medication Review:   [START ON 5/10/2022] allopurinol, 100 mg, Oral, Daily  [START ON 5/10/2022] aspirin, 81 mg, Oral, Daily  budesonide-formoterol, 2 puff, Inhalation, BID - RT  carbidopa-levodopa, 1 tablet, Oral, TID  cefTRIAXone, 1 g, Intravenous, Q24H  insulin lispro, 0-9 Units, Subcutaneous, Q6H  metoprolol tartrate, 50 mg, Oral, BID  nystatin, , Topical, TID  pantoprazole, 40 mg, Oral, BID AC  sodium chloride, 3 mL, Intravenous, Q12H  sodium chloride, 3 mL, Intravenous, Q12H          Assessment  Acute kidney injury - pre-renal, resolving     Nausea and vomiting  Status post EGD which showed a large complex hiatal hernia and dilation of the fundus     Hypertension     Coronary artery disease     Urinary tract infection  Patient on  ceftriaxone    Plan:  Cr improved to 1.05  Lytes and volume OK  Monitor off IVF  Encourage PO  Will follow        Saleem Drake MD  05/09/22  09:26 EDT  Tel: [phone]  Fax: [phone]

## 2022-05-09 NOTE — PLAN OF CARE
Goal Outcome Evaluation:   VSS. Oriented to self only. In the morning, pt was pleasantly confused. During mid-afternoon, pt became more confused and was yelling for help and occasionally had to be reassured. Turned q2 hours. No complaints of pain. Will continue to monitor.

## 2022-05-09 NOTE — PROGRESS NOTES
Referring Provider: Hospitalist    Reason for follow-up: Preop     Patient Care Team:  Giovani Mejia MD as PCP - General (Geriatric Medicine)  Jasbir Parr MD as Consulting Physician (Cardiology)    Subjective .  Patient doing better with less nausea and vomiting    Objective  Lying in bed comfortably     Review of Systems   Constitutional: Negative for fever and malaise/fatigue.   Cardiovascular: Negative for chest pain, dyspnea on exertion and palpitations.   Respiratory: Negative for cough and shortness of breath.    Skin: Negative for rash.   Gastrointestinal: Negative for abdominal pain, nausea and vomiting.   Neurological: Negative for focal weakness and headaches.   All other systems reviewed and are negative.      Codeine, Latex, and Namenda [memantine]    Scheduled Meds:[START ON 5/10/2022] allopurinol, 100 mg, Oral, Daily  [START ON 5/10/2022] aspirin, 81 mg, Oral, Daily  budesonide-formoterol, 2 puff, Inhalation, BID - RT  carbidopa-levodopa, 1 tablet, Oral, TID  cefdinir, 300 mg, Oral, Q12H  insulin lispro, 0-9 Units, Subcutaneous, Q6H  metoprolol tartrate, 50 mg, Oral, BID  nystatin, , Topical, TID  pantoprazole, 40 mg, Oral, BID AC  sodium chloride, 3 mL, Intravenous, Q12H  sodium chloride, 3 mL, Intravenous, Q12H      Continuous Infusions:   PRN Meds:.•  acetaminophen  •  albuterol  •  dextrose  •  dextrose  •  glucagon (human recombinant)  •  insulin lispro **AND** insulin lispro  •  ipratropium-albuterol  •  magnesium hydroxide  •  nitroglycerin  •  ondansetron **OR** ondansetron  •  [COMPLETED] Insert peripheral IV **AND** sodium chloride  •  [COMPLETED] Insert peripheral IV **AND** sodium chloride  •  sodium chloride  •  sodium chloride        VITAL SIGNS  Vitals:    05/09/22 0500 05/09/22 0817 05/09/22 1207 05/09/22 1301   BP: 160/76  154/62 160/76   BP Location:   Left arm    Patient Position:   Lying    Pulse: 82  50    Resp: 20  20    Temp: 98.6 °F (37 °C)  98 °F (36.7 °C)   "  TempSrc: Oral  Oral    SpO2: 99% 97% 98%    Weight: 82.6 kg (182 lb 1.6 oz)   82.6 kg (182 lb)   Height:    160 cm (63\")       Flowsheet Rows    Flowsheet Row First Filed Value   Admission Height 160 cm (63\") Documented at 05/05/2022 2037   Admission Weight 77.1 kg (170 lb) Documented at 05/05/2022 2037           TELEMETRY: Sinus rhythm    Physical Exam:  Constitutional:       Appearance: Well-developed.   Eyes:      General: No scleral icterus.     Conjunctiva/sclera: Conjunctivae normal.   HENT:      Head: Normocephalic and atraumatic.   Neck:      Vascular: No carotid bruit or JVD.   Pulmonary:      Effort: Pulmonary effort is normal.      Breath sounds: Normal breath sounds. No wheezing. No rales.   Cardiovascular:      Normal rate. Regular rhythm.   Pulses:     Intact distal pulses.   Abdominal:      General: Bowel sounds are normal.      Palpations: Abdomen is soft.   Musculoskeletal:      Cervical back: Normal range of motion and neck supple. Skin:     General: Skin is warm and dry.      Findings: No rash.   Neurological:      Mental Status: Alert.          Results Review:   I reviewed the patient's new clinical results.  Lab Results (last 24 hours)     Procedure Component Value Units Date/Time    POC Glucose Once [[id number]]  (Abnormal) Collected: 05/09/22 1223    Specimen: Blood Updated: 05/09/22 1224     Glucose 154 mg/dL      Comment: Serial Number: [id number]:  [id number]       POC Glucose Once [[id number]]  (Abnormal) Collected: 05/09/22 1214    Specimen: Blood Updated: 05/09/22 1216     Glucose 147 mg/dL      Comment: Serial Number: [id number]:  [id number]       POC Glucose Once [[id number]]  (Abnormal) Collected: 05/09/22 0533    Specimen: Blood Updated: 05/09/22 0534     Glucose 112 mg/dL      Comment: Serial Number: [id number]:  [id number]       Comprehensive Metabolic Panel [[id number]]  (Abnormal) Collected: 05/09/22 0137    Specimen: Blood Updated: 05/09/22 0310     Glucose 107 " mg/dL      BUN 10 mg/dL      Creatinine 1.05 mg/dL      Sodium 139 mmol/L      Potassium 3.6 mmol/L      Chloride 103 mmol/L      CO2 26.0 mmol/L      Calcium 8.7 mg/dL      Total Protein 5.9 g/dL      Albumin 3.00 g/dL      ALT (SGPT) <5 U/L      AST (SGOT) 8 U/L      Alkaline Phosphatase 81 U/L      Total Bilirubin 0.4 mg/dL      Globulin 2.9 gm/dL      A/G Ratio 1.0 g/dL      BUN/Creatinine Ratio 9.5     Anion Gap 10.0 mmol/L      eGFR 52.5 mL/min/1.73      Comment: National Kidney Foundation and American Society of Nephrology (ASN) Task Force recommended calculation based on the Chronic Kidney Disease Epidemiology Collaboration (CKD-EPI) equation refit without adjustment for race.       Narrative:      GFR Normal >60  Chronic Kidney Disease <60  Kidney Failure <15      CBC (No Diff) [[id number]]  (Abnormal) Collected: 05/09/22 0137    Specimen: Blood Updated: 05/09/22 0240     WBC 8.20 10*3/mm3      RBC 3.51 10*6/mm3      Hemoglobin 10.5 g/dL      Hematocrit 31.9 %      MCV 90.8 fL      MCH 30.0 pg      MCHC 33.1 g/dL      RDW 15.5 %      RDW-SD 49.9 fl      MPV 7.7 fL      Platelets 251 10*3/mm3     POC Glucose Once [[id number]]  (Normal) Collected: 05/08/22 2307    Specimen: Blood Updated: 05/08/22 2310     Glucose 102 mg/dL      Comment: Serial Number: [id number]:  [id number]       Creatinine, Urine, Random - Urine, Clean Catch [[id number]] Collected: 05/08/22 1349    Specimen: Urine, Clean Catch Updated: 05/08/22 1746     Creatinine, Urine 53.9 mg/dL     Narrative:      Reference intervals for random urine have not been established.  Clinical usage is dependent upon physician's interpretation in combination with other laboratory tests.             Imaging Results (Last 24 Hours)     Procedure Component Value Units Date/Time    XR Abdomen KUB [[id number]] Collected: 05/09/22 1406     Updated: 05/09/22 1410    Narrative:      DATE OF EXAM:  5/9/2022 2:03 PM     PROCEDURE:  XR ABDOMEN KUB-      INDICATIONS:  abd pain; R11.2-Nausea with vomiting, unspecified; K44.9-Diaphragmatic  hernia without obstruction or gangrene; N39.0-Urinary tract infection,  site not specified     COMPARISON:  CT abdomen and pelvis without contrast 5/5/2022.     TECHNIQUE:   Single radiographic view of the abdomen was obtained.        FINDINGS:  Hiatal hernia is seen projecting right of midline of the lower thorax.  Nonspecific but nonobstructive bowel gas pattern. Vertebroplasty changes  are present at L3. Surgical clip is seen in the upper abdomen right of  midline. There are moderate degenerative changes of both hips. No gross  free intraperitoneal air is seen. No radiopaque urinary tract stone is  identified.        Impression:         1. No acute findings in the abdomen.  2. Large hiatal hernia projecting over the right lower thorax, seen to  better advantage on prior CT abdomen of 5/5/2022.     Electronically Signed By-Carmita Ceballos MD On:5/9/2022 2:08 PM  This report was finalized on [id number] by  Carmita Ceballos MD.          EKG      I personally viewed and interpreted the patient's EKG/Telemetry data:    ECHOCARDIOGRAM:    STRESS MYOVIEW:    CARDIAC CATHETERIZATION:    OTHER:         Assessment/Plan     Active Problems:    Hiatal hernia    Type 2 diabetes mellitus without complication, with long-term current use of insulin ([facility])    Hyperlipidemia    GERD (gastroesophageal reflux disease)    Hypertension    Dementia ([facility])    Atherosclerotic heart disease of native coronary artery without angina pectoris    Gout    Parkinson's disease ([facility])    CKD (chronic kidney disease) stage 4, GFR 15-29 ml/min ([facility])    Nausea and vomiting, unspecified vomiting type    Acute UTI (urinary tract infection)    COPD (chronic obstructive pulmonary disease) ([facility])       Patient presented with nausea and vomiting and was noted to have significant hiatal hernia  Patient has history of coronary disease and is not having any symptoms and is  ruled out  Patient blood pressure and heart rate stable  Patient lipid levels are sugar levels are followed by the primary care doctor  Patient was cleared by my colleague for GI surgery from cardiovascular standpoint and is at low risk at this time according to be  We will continue medical therapy     I discussed the patients findings and my recommendations with patient       Virgilio Recinos MD  05/09/22  17:46 EDT

## 2022-05-09 NOTE — PROGRESS NOTES
[facility] Medicine Services Daily Progress Note    Patient Name: Nory Duff  : 1940  MRN: [id number]  Primary Care Physician:  Giovani Mejia MD  Date of admission: 2022      Subjective      Chief Complaint: Abdominal pain      Patient Reports     22: Complains of abdominal pain      Review of Systems   All other systems reviewed and are negative.         Objective      Vitals:   Temp:  [98 °F (36.7 °C)-98.6 °F (37 °C)] 98 °F (36.7 °C)  Heart Rate:  [50-82] 50  Resp:  [16-20] 20  BP: (154-160)/(62-78) 160/76    Physical Exam  HENT:      Head: Normocephalic.      Nose: Nose normal.   Eyes:      General: No scleral icterus.     Extraocular Movements: Extraocular movements intact.      Pupils: Pupils are equal, round, and reactive to light.   Cardiovascular:      Rate and Rhythm: Normal rate and regular rhythm.   Pulmonary:      Effort: Pulmonary effort is normal.      Breath sounds: Normal breath sounds.   Abdominal:      General: Bowel sounds are normal.      Tenderness: There is no abdominal tenderness.   Musculoskeletal:      Cervical back: Normal range of motion. No muscular tenderness.      Comments: Decreased range of motion hips   Lymphadenopathy:      Cervical: No cervical adenopathy.   Skin:     General: Skin is warm.   Neurological:      Mental Status: She is alert.   Psychiatric:         Behavior: Behavior is cooperative.             Result Review    Result Review:  I have personally reviewed the results from the time of this admission to 2022 13:17 EDT and agree with these findings:  [x]  Laboratory  []  Microbiology  [x]  Radiology  []  EKG/Telemetry   []  Cardiology/Vascular   []  Pathology  [x]  Old records  []  Other:            Assessment/Plan      Brief Patient Summary:    82-year-old female with n/v and abdominal pain s/p EGD with finding of large complex hiatal hernia s/p endoscopic placement of NG tube the junction of stomach body and  antrum and eventual evaluation by general surgery    [START ON 5/10/2022] allopurinol, 100 mg, Oral, Daily  [START ON 5/10/2022] aspirin, 81 mg, Oral, Daily  budesonide-formoterol, 2 puff, Inhalation, BID - RT  carbidopa-levodopa, 1 tablet, Oral, TID  cefdinir, 300 mg, Oral, Q12H  insulin lispro, 0-9 Units, Subcutaneous, Q6H  metoprolol tartrate, 50 mg, Oral, BID  nystatin, , Topical, TID  pantoprazole, 40 mg, Oral, BID AC  sodium chloride, 3 mL, Intravenous, Q12H  sodium chloride, 3 mL, Intravenous, Q12H             Active Hospital Problems:  Active Hospital Problems    Diagnosis    • COPD (chronic obstructive pulmonary disease) ([facility])    • Nausea and vomiting, unspecified vomiting type    • Acute UTI (urinary tract infection)    • GERD (gastroesophageal reflux disease)    • Hypertension    • Dementia ([facility])    • Atherosclerotic heart disease of native coronary artery without angina pectoris    • Gout    • Parkinson's disease ([facility])    • CKD (chronic kidney disease) stage 4, GFR 15-29 ml/min ([facility])    • Hiatal hernia    • Hyperlipidemia    • Type 2 diabetes mellitus without complication, with long-term current use of insulin ([facility])        Nausea and  Vomiting: resolved  -s/p s/p EGD 5/6/2022 with finding of large complex hiatal hernia s/p endoscopic placement of NG tube the junction of stomach body and antrum and eventual evaluation by general surgery    E. coli UTI  -Treated with Rocephin.  Changed to Omnicef.    IZABELLA: Resolved  -s/p IV fluids  -Lasix held  -Nephrology consulted     COPD:  -Not exacerbation  -Continue bronchodilators      GERD  -Continue PPI     Hypertension  -Continue metoprolol     CAD  -Continue Plavix, aspirin, beta-blocker     Parkinson's disease  -Continue Sinemet     Diabetes  -Hold off on Lantus for now     DVT prophylaxis:  Mechanical DVT prophylaxis orders are present.    CODE STATUS:    Code Status (Patient has no pulse and is not breathing): No CPR (Do Not Attempt to Resuscitate)  Medical  Interventions (Patient has pulse or is breathing): Full Support  Comments: document on chart from nursing home      Disposition:  I expect patient to be discharged defer.    This patient has been examined wearing appropriate Personal Protective Equipment and discussed with nursing. 05/09/22      Electronically signed by Delroy Olivares DO, 05/09/22, 13:17 EDT.  [facility] Hospitalist Team

## 2022-05-10 VITALS
BODY MASS INDEX: 32.73 KG/M2 | WEIGHT: 184.75 LBS | RESPIRATION RATE: 16 BRPM | DIASTOLIC BLOOD PRESSURE: 101 MMHG | OXYGEN SATURATION: 99 % | HEIGHT: 63 IN | TEMPERATURE: 97.6 F | HEART RATE: 53 BPM | SYSTOLIC BLOOD PRESSURE: 176 MMHG

## 2022-05-10 PROBLEM — R11.2 NAUSEA AND VOMITING, UNSPECIFIED VOMITING TYPE: Status: RESOLVED | Noted: 2022-05-05 | Resolved: 2022-05-10

## 2022-05-10 LAB
ANION GAP SERPL CALCULATED.3IONS-SCNC: 9 MMOL/L (ref 5–15)
BASOPHILS # BLD AUTO: 0.1 10*3/MM3 (ref 0–0.2)
BASOPHILS NFR BLD AUTO: 1 % (ref 0–1.5)
BUN SERPL-MCNC: 12 MG/DL (ref 8–23)
BUN/CREAT SERPL: 11.8 (ref 7–25)
CALCIUM SPEC-SCNC: 8.8 MG/DL (ref 8.6–10.5)
CHLORIDE SERPL-SCNC: 106 MMOL/L (ref 98–107)
CO2 SERPL-SCNC: 25 MMOL/L (ref 22–29)
CREAT SERPL-MCNC: 1.02 MG/DL (ref 0.57–1)
DEPRECATED RDW RBC AUTO: 48.6 FL (ref 37–54)
EGFRCR SERPLBLD CKD-EPI 2021: 54.4 ML/MIN/1.73
EOSINOPHIL # BLD AUTO: 0.3 10*3/MM3 (ref 0–0.4)
EOSINOPHIL NFR BLD AUTO: 3.8 % (ref 0.3–6.2)
ERYTHROCYTE [DISTWIDTH] IN BLOOD BY AUTOMATED COUNT: 15.3 % (ref 12.3–15.4)
GLUCOSE BLDC GLUCOMTR-MCNC: 105 MG/DL (ref 70–105)
GLUCOSE BLDC GLUCOMTR-MCNC: 105 MG/DL (ref 70–105)
GLUCOSE BLDC GLUCOMTR-MCNC: 106 MG/DL (ref 70–105)
GLUCOSE BLDC GLUCOMTR-MCNC: 190 MG/DL (ref 70–105)
GLUCOSE SERPL-MCNC: 107 MG/DL (ref 65–99)
HCT VFR BLD AUTO: 32.3 % (ref 34–46.6)
HGB BLD-MCNC: 10.7 G/DL (ref 12–15.9)
LYMPHOCYTES # BLD AUTO: 1.5 10*3/MM3 (ref 0.7–3.1)
LYMPHOCYTES NFR BLD AUTO: 21.5 % (ref 19.6–45.3)
MCH RBC QN AUTO: 29.9 PG (ref 26.6–33)
MCHC RBC AUTO-ENTMCNC: 33.3 G/DL (ref 31.5–35.7)
MCV RBC AUTO: 89.9 FL (ref 79–97)
MONOCYTES # BLD AUTO: 0.8 10*3/MM3 (ref 0.1–0.9)
MONOCYTES NFR BLD AUTO: 11.7 % (ref 5–12)
NEUTROPHILS NFR BLD AUTO: 4.2 10*3/MM3 (ref 1.7–7)
NEUTROPHILS NFR BLD AUTO: 62 % (ref 42.7–76)
NRBC BLD AUTO-RTO: 0.2 /100 WBC (ref 0–0.2)
PLATELET # BLD AUTO: 240 10*3/MM3 (ref 140–450)
PMV BLD AUTO: 7.8 FL (ref 6–12)
POTASSIUM SERPL-SCNC: 3.6 MMOL/L (ref 3.5–5.2)
QT INTERVAL: 365 MS
RBC # BLD AUTO: 3.59 10*6/MM3 (ref 3.77–5.28)
SODIUM SERPL-SCNC: 140 MMOL/L (ref 136–145)
WBC NRBC COR # BLD: 6.8 10*3/MM3 (ref 3.4–10.8)

## 2022-05-10 PROCEDURE — 99238 HOSP IP/OBS DSCHRG MGMT 30/<: CPT | Performed by: HOSPITALIST

## 2022-05-10 PROCEDURE — 94799 UNLISTED PULMONARY SVC/PX: CPT

## 2022-05-10 PROCEDURE — 85025 COMPLETE CBC W/AUTO DIFF WBC: CPT | Performed by: HOSPITALIST

## 2022-05-10 PROCEDURE — 63710000001 INSULIN LISPRO (HUMAN) PER 5 UNITS: Performed by: HOSPITALIST

## 2022-05-10 PROCEDURE — 80048 BASIC METABOLIC PNL TOTAL CA: CPT | Performed by: INTERNAL MEDICINE

## 2022-05-10 PROCEDURE — 94664 DEMO&/EVAL PT USE INHALER: CPT

## 2022-05-10 PROCEDURE — 82962 GLUCOSE BLOOD TEST: CPT

## 2022-05-10 RX ORDER — POLYETHYLENE GLYCOL 3350 17 G/17G
17 POWDER, FOR SOLUTION ORAL DAILY PRN
Start: 2022-05-10 | End: 2022-06-03

## 2022-05-10 RX ORDER — ONDANSETRON 4 MG/1
4 TABLET, FILM COATED ORAL EVERY 6 HOURS PRN
Start: 2022-05-10

## 2022-05-10 RX ORDER — CEFDINIR 300 MG/1
300 CAPSULE ORAL EVERY 12 HOURS SCHEDULED
Qty: 5 CAPSULE | Refills: 0
Start: 2022-05-10 | End: 2022-05-13

## 2022-05-10 RX ORDER — LACTULOSE 10 G/15ML
20 SOLUTION ORAL 3 TIMES DAILY
Status: DISCONTINUED | OUTPATIENT
Start: 2022-05-10 | End: 2022-05-10 | Stop reason: HOSPADM

## 2022-05-10 RX ADMIN — LACTULOSE 20 G: 20 SOLUTION ORAL at 10:41

## 2022-05-10 RX ADMIN — ALLOPURINOL 100 MG: 100 TABLET ORAL at 09:23

## 2022-05-10 RX ADMIN — PANTOPRAZOLE SODIUM 40 MG: 40 TABLET, DELAYED RELEASE ORAL at 09:23

## 2022-05-10 RX ADMIN — PANTOPRAZOLE SODIUM 40 MG: 40 TABLET, DELAYED RELEASE ORAL at 17:34

## 2022-05-10 RX ADMIN — INSULIN LISPRO 2 UNITS: 100 INJECTION, SOLUTION INTRAVENOUS; SUBCUTANEOUS at 12:37

## 2022-05-10 RX ADMIN — CARBIDOPA AND LEVODOPA 1 TABLET: 25; 100 TABLET ORAL at 09:23

## 2022-05-10 RX ADMIN — CEFDINIR 300 MG: 300 CAPSULE ORAL at 09:23

## 2022-05-10 RX ADMIN — NYSTATIN: 100000 POWDER TOPICAL at 17:34

## 2022-05-10 RX ADMIN — METOPROLOL TARTRATE 50 MG: 50 TABLET, FILM COATED ORAL at 09:22

## 2022-05-10 RX ADMIN — BUDESONIDE AND FORMOTEROL FUMARATE DIHYDRATE 2 PUFF: 160; 4.5 AEROSOL RESPIRATORY (INHALATION) at 06:24

## 2022-05-10 RX ADMIN — NYSTATIN: 100000 POWDER TOPICAL at 09:22

## 2022-05-10 RX ADMIN — ASPIRIN 81 MG: 81 TABLET, CHEWABLE ORAL at 09:22

## 2022-05-10 RX ADMIN — CARBIDOPA AND LEVODOPA 1 TABLET: 25; 100 TABLET ORAL at 17:34

## 2022-05-10 NOTE — CASE MANAGEMENT/SOCIAL WORK
Continued Stay Note  ROBERT Fowler     Patient Name: Nory Duff  MRN: [id number]  Today's Date: 5/10/2022    Admit Date: 5/5/2022     Discharge Plan     Row Name 05/10/22 1230       Plan    Plan Comments MD and bedside nurse notfied patient can dc to facility today and pharmacy is updated                            Expected Discharge Date and Time     Expected Discharge Date Expected Discharge Time    May 10, 2022             Jennifer Man RN

## 2022-05-10 NOTE — DISCHARGE SUMMARY
[facility] Medicine Services  DISCHARGE SUMMARY    Patient Name: Nory Duff  : 1940  MRN: [id number]    Date of Admission: 2022  Date of Discharge:  5/10/2022  Primary Care Physician: Giovani Mejia MD      Presenting Problem:   Hiatal hernia [K44.9]  Urinary tract infection without hematuria, site unspecified [N39.0]  Nausea and vomiting, unspecified vomiting type [R11.2]    Active and Resolved Hospital Problems:  Active Hospital Problems    Diagnosis POA   • COPD (chronic obstructive pulmonary disease) (HCC) [J44.9] Yes   • Nausea and vomiting, unspecified vomiting type [R11.2] Yes   • Acute UTI (urinary tract infection) [N39.0] Yes   • GERD (gastroesophageal reflux disease) [K21.9] Yes   • Hypertension [I10] Yes   • Dementia ([facility]) [F03.90] Yes   • Atherosclerotic heart disease of native coronary artery without angina pectoris [I25.10] Yes   • Gout [M10.9] Yes   • Parkinson's disease ([facility]) [G20] Yes   • CKD (chronic kidney disease) stage 4, GFR 15-29 ml/min ([facility]) [N18.4] Yes   • Hiatal hernia [K44.9] Yes   • Hyperlipidemia [E78.5] Yes   • Type 2 diabetes mellitus without complication, with long-term current use of insulin ([facility]) [E11.9, Z79.4] Not Applicable      Resolved Hospital Problems   No resolved problems to display.         Hospital Course     Hospital Course:    The patient is an 82-year-old female with history of COPD, CAD, dementia, Parkinson's disease, hypertension, hyperlipidemia, GERD, type 2 diabetes mellitus and large hiatal hernia.    The patientt presented to the emergency room from [facility] on 2022 for evaluation of nausea and vomiting.  CT of the abdomen pelvis showed large hiatal hernia.  The patient was recently in the hospital in 2021 for GI bleed and had EGD at that time which had revealed a very large hiatal hernia but no active bleeding..    The patient was admitted to the [facility] and evaluated by gastroenterology and  nephrologist.  She underwent EGD on 5/6/2022 and it showed large complex hiatal hernia causing volvulus type physiology with dilation and presumptive obstruction of the fundus.  She was then evaluated by general surgeon and medical management was recommended.  She will be discharged back to [facility] on 5/10/2022.      DISCHARGE Follow Up Recommendations for labs and diagnostics:       Reasons For Change In Medications and Indications for New Medications:      Day of Discharge     Vital Signs:  Temp:  [97.5 °F (36.4 °C)-98.9 °F (37.2 °C)] 97.5 °F (36.4 °C)  Heart Rate:  [56-60] 60  Resp:  [16-18] 16  BP: (139-160)/(71-79) 139/78    Physical Exam:  Physical Exam  Constitutional:       General: She is not in acute distress.  HENT:      Head: Normocephalic and atraumatic.      Nose: Nose normal.   Eyes:      General: No scleral icterus.     Extraocular Movements: Extraocular movements intact.      Pupils: Pupils are equal, round, and reactive to light.   Cardiovascular:      Rate and Rhythm: Normal rate and regular rhythm.      Pulses: No decreased pulses.   Pulmonary:      Effort: Pulmonary effort is normal.      Breath sounds: Normal breath sounds.   Abdominal:      General: Bowel sounds are normal.      Tenderness: There is no abdominal tenderness.   Musculoskeletal:      Cervical back: Normal range of motion.      Comments: Decreased ROM hips   Lymphadenopathy:      Cervical: No cervical adenopathy.   Skin:     General: Skin is warm.      Findings: No rash.   Neurological:      Mental Status: She is alert.   Psychiatric:         Behavior: Behavior is cooperative.         Cognition and Memory: Memory is impaired.            Pertinent  and/or Most Recent Results     LAB RESULTS:      Lab 05/10/22  0237 05/09/22  0137 05/08/22  0220 05/07/22  0209 05/06/22  0315 05/05/22  2201   WBC 6.80 8.20 8.80 8.70 10.80 12.60*   HEMOGLOBIN 10.7* 10.5* 10.7* 10.8* 12.5 13.2   HEMATOCRIT 32.3* 31.9* 32.2* 32.7* 37.6 41.4    PLATELETS 240 251 251 283 299 307   NEUTROS ABS 4.20  --  6.70 6.30 8.40* 10.30*   LYMPHS ABS 1.50  --  1.00 1.20 1.50 1.20   MONOS ABS 0.80  --  0.70 0.70 0.70 0.80   EOS ABS 0.30  --  0.40 0.40 0.10 0.30   MCV 89.9 90.8 91.1 91.4 90.3 90.1         Lab 05/10/22  0237 05/09/22  0137 05/08/22  0219 05/07/22  0209 05/06/22  0315   SODIUM 140 139 141 143 142   POTASSIUM 3.6 3.6 3.7 3.8 4.0   CHLORIDE 106 103 103 104 98   CO2 25.0 26.0 25.0 29.0 31.0*   ANION GAP 9.0 10.0 13.0 10.0 13.0   BUN 12 10 13 17 21   CREATININE 1.02* 1.05* 1.21* 1.28* 1.34*   EGFR 54.4* 52.5* 44.3* 41.4* 39.2*   GLUCOSE 107* 107* 115* 123* 195*   CALCIUM 8.8 8.7 8.4* 9.0 9.3   HEMOGLOBIN A1C  --   --   --   --  7.5*         Lab 05/09/22  0137 05/07/22  0209 05/05/22  2201   TOTAL PROTEIN 5.9* 5.9* 7.2   ALBUMIN 3.00* 2.90* 3.60   GLOBULIN 2.9 3.0 3.6   ALT (SGPT) <5 <5 <5   AST (SGOT) 8 8 10   BILIRUBIN 0.4 0.3 0.4   ALK PHOS 81 81 101   LIPASE  --   --  12*                     Brief Urine Lab Results  (Last result in the past 365 days)      Color   Clarity   Blood   Leuk Est   Nitrite   Protein   CREAT   Urine HCG        05/08/22 1354 Yellow   Turbid   Moderate (2+)   Large (3+)   Negative   Trace               Microbiology Results (last 10 days)     Procedure Component Value - Date/Time    COVID PRE-OP / PRE-PROCEDURE SCREENING ORDER (NO ISOLATION) - Swab, Nasopharynx [[id number]]  (Normal) Collected: 05/05/22 4040    Lab Status: Final result Specimen: Swab from Nasopharynx Updated: 05/06/22 0011    Narrative:      The following orders were created for panel order COVID PRE-OP / PRE-PROCEDURE SCREENING ORDER (NO ISOLATION) - Swab, Nasopharynx.  Procedure                               Abnormality         Status                     ---------                               -----------         ------                     COVID-19,CEPHEID/JOSE,CO...[[id number]]  Normal              Final result                 Please view results for these tests on  the individual orders.    COVID-19,CEPHEID/JOSE,COR/AGUEDA/PAD/LAURIE IN-HOUSE(OR EMERGENT/ADD-ON),NP SWAB IN TRANSPORT MEDIA 3-4 HR TAT, RT-PCR - Swab, Nasopharynx [[id number]]  (Normal) Collected: 05/05/22 2340    Lab Status: Final result Specimen: Swab from Nasopharynx Updated: 05/06/22 0011     COVID19 Not Detected    Narrative:      Fact sheet for providers: https://www.fda.gov/media/165504/download     Fact sheet for patients: https://www.fda.gov/media/815476/download  Fact sheet for providers: https://www.fda.gov/media/517114/download    Fact sheet for patients: https://www.fda.gov/media/475833/download    Test performed by PCR.    Urine Culture - Urine, Urine, Catheter [[id number]]  (Abnormal)  (Susceptibility) Collected: 05/05/22 2224    Lab Status: Final result Specimen: Urine, Catheter Updated: 05/07/22 1115     Urine Culture >100,000 CFU/mL Escherichia coli    Susceptibility      Escherichia coli      ERNESTO      Ampicillin Susceptible      Ampicillin + Sulbactam Susceptible      Cefazolin Susceptible      Cefepime Susceptible      Ceftazidime Susceptible      Ceftriaxone Susceptible      Gentamicin Susceptible      Levofloxacin Susceptible      Nitrofurantoin Susceptible      Piperacillin + Tazobactam Susceptible      Trimethoprim + Sulfamethoxazole Susceptible                                 CT Abdomen Pelvis Without Contrast    Result Date: 5/5/2022  Impression: 1.There is a large hiatal hernia with the gastric body and antrum above the diaphragm. There is significant wall thickening of the esophagus. The gastroesophageal junction is at the diaphragmatic hiatus and there is distention of the excluded fundus. Obstruction at the hernia site is suspected. 2.No free air or free fluid. 3.Mildly comminuted diverticulitis in the descending colon. 4.Status post hysterectomy.    Electronically Signed By-Mini Bower MD On:5/5/2022 10:47 PM This report was finalized on [id number] by  Mini Bower MD.    XR Abdomen  KUB    Result Date: 5/9/2022  Impression:  1. No acute findings in the abdomen. 2. Large hiatal hernia projecting over the right lower thorax, seen to better advantage on prior CT abdomen of 5/5/2022.  Electronically Signed By-Carmita Ceballos MD On:5/9/2022 2:08 PM This report was finalized on [id number] by  Carmita Ceballos MD.              Results for orders placed during the hospital encounter of 05/05/22    Adult Transthoracic Echo Complete w/ Color, Spectral and Contrast if Necessary Per Protocol    Interpretation Summary  · Left ventricular ejection fraction appears to be 56 - 60%.  · No pericardial effusion noted      Labs Pending at Discharge:      Procedures Performed  Procedure(s):  ESOPHAGOGASTRODUODENOSCOPY with nasogastric tube placement         Consults:   Consults     Date and Time Order Name Status Description    5/7/2022  3:45 PM Inpatient Cardiology Consult Completed     5/7/2022  7:02 AM Inpatient Nephrology Consult      5/6/2022  7:17 AM Inpatient General Surgery Consult Completed     5/5/2022 11:44 PM Inpatient Gastroenterology Consult Completed     5/5/2022 11:03 PM Hospitalist (on-call MD unless specified)              Discharge Details        Discharge Medications      New Medications      Instructions Start Date   cefdinir 300 MG capsule  Commonly known as: OMNICEF   300 mg, Oral, Every 12 Hours Scheduled      ondansetron 4 MG tablet  Commonly known as: ZOFRAN   4 mg, Oral, Every 6 Hours PRN      polyethylene glycol 17 g packet  Commonly known as: MIRALAX   17 g, Oral, Daily PRN         Continue These Medications      Instructions Start Date   acetaminophen 325 MG tablet  Commonly known as: TYLENOL   650 mg, Oral, Every 6 Hours PRN      albuterol (2.5 MG/3ML) 0.083% nebulizer solution  Commonly known as: PROVENTIL   2.5 mg, Nebulization, Every 4 Hours PRN      allopurinol 100 MG tablet  Commonly known as: ZYLOPRIM   200 mg, Oral, Daily      aspirin 81 MG EC tablet   81 mg, Oral, Daily       budesonide-formoterol 160-4.5 MCG/ACT inhaler  Commonly known as: Symbicort   2 puffs, Inhalation, 2 Times Daily - RT      carbidopa-levodopa  MG per tablet  Commonly known as: SINEMET   1 tablet, Oral, 3 Times Daily      clopidogrel 75 MG tablet  Commonly known as: PLAVIX   75 mg, Oral, Daily      fish oil 1000 MG capsule capsule   1,000 mg, Oral, Daily With Breakfast      furosemide 20 MG tablet  Commonly known as: LASIX   20 mg, Oral, 2 Times Daily, q day x 7 days, check bmp in one week. Then increase to BID      insulin glargine 100 UNIT/ML injection  Commonly known as: LANTUS, SEMGLEE   20 Units, Subcutaneous, Daily      magnesium hydroxide 400 MG/5ML suspension  Commonly known as: MILK OF MAGNESIA   30 mL, Oral, Daily PRN      metoprolol tartrate 50 MG tablet  Commonly known as: LOPRESSOR   50 mg, Oral, 2 Times Daily      nitroglycerin 0.4 MG SL tablet  Commonly known as: NITROSTAT   0.4 mg, Sublingual, Every 5 Minutes PRN, Take no more than 3 doses in 15 minutes.      nystatin 644726 UNIT/GM powder  Commonly known as: MYCOSTATIN   Topical, 3 Times Daily, Under breasts/ abd folds      pantoprazole 40 MG EC tablet  Commonly known as: PROTONIX   40 mg, Oral, Daily      selenium sulfide 1 % lotion  Commonly known as: SELSUN   Topical, Every 7 Days, Tuesday              Allergies   Allergen Reactions   • Codeine Anaphylaxis   • Latex Anaphylaxis   • Namenda [Memantine] Anaphylaxis         Discharge Disposition:   Skilled Nursing Facility (DC - External)    Diet:  Hospital:  Diet Order   Procedures   • Diet Liquids; Full Liquid         Discharge Activity: as tolerated      Discharge Condition: stable      CODE STATUS:  Code Status and Medical Interventions:   Ordered at: 05/06/22 0005     Code Status (Patient has no pulse and is not breathing):    No CPR (Do Not Attempt to Resuscitate)     Medical Interventions (Patient has pulse or is breathing):    Full Support     Comments:    document on chart from  nursing home         No future appointments.    Additional Instructions for the Follow-ups that You Need to Schedule     Discharge Follow-up with PCP   As directed       Currently Documented PCP:    Giovani Mejia MD    PCP Phone Number:    [phone]     Follow Up Details: 2 weeks               Time spent on Discharge including face to face service:  15 minutes    This patient has been examined wearing appropriate Personal Protective Equipment and discussed with nursing. 05/10/22      Signature: Electronically signed by Delroy Olivares DO, 05/10/22, 12:43 PM EDT.

## 2022-05-10 NOTE — NURSING NOTE
Report called to [facility] to STACI Qureshi. To be transported by daughter per personal vehicle.

## 2022-05-10 NOTE — PROGRESS NOTES
Can be discharged on soft diet. See me in one-two weeks for follow up.          This document has been electronically signed by Sameer Lai MD on May 10, 2022 14:34 EDT

## 2022-05-10 NOTE — PROGRESS NOTES
Name: Nory Duff  Age: 82 y.o.  : 1940  Sex: female    05/10/22    Subjective  Feels well but states she is weak       Objective:    Vital Signs  Temp:  [98 °F (36.7 °C)-98.9 °F (37.2 °C)] 98.2 °F (36.8 °C)  Heart Rate:  [50-60] 60  Resp:  [16-20] 16  BP: (151-160)/(62-79) 151/71        Intake/Output Summary (Last 24 hours) at 5/10/2022 0944  Last data filed at 2022 2335  Gross per 24 hour   Intake 240 ml   Output --   Net 240 ml         Physical Exam  GEN: Awake, NAD  ENT: PERRL, EOMI, MMM  NECK: Supple, no JVD  CHEST: CTAB, no W/R/C  CV: RRR, no M/G/R  ABD: Soft, NT, +BS  SKIN: Warm and Dry  NEURO: CN's intact         Results Review:      Results from last 7 days   Lab Units 05/10/22  0237 05/09/22  0137 05/08/22  0219 05/07/22  02022  220   SODIUM mmol/L 140 139 141 143 142 140   CO2 mmol/L 25.0 26.0 25.0 29.0 31.0* 32.0*   BUN mg/dL 12 10 13 17 21 22   CREATININE mg/dL 1.02* 1.05* 1.21* 1.28* 1.34* 1.35*   CALCIUM mg/dL 8.8 8.7 8.4* 9.0 9.3 9.8   ALBUMIN g/dL  --  3.00*  --  2.90*  --  3.60   AST (SGOT) U/L  --  8  --  8  --  10   ALT (SGPT) U/L  --  <5  --  <5  --  <5   EGFR mL/min/1.73 54.4* 52.5* 44.3* 41.4* 39.2* 39.1*       Results from last 7 days   Lab Units 05/10/22  02322  0137 22  0220 22  02022  03122  2201   WBC 10*3/mm3 6.80 8.20 8.80 8.70 10.80 12.60*         Medication Review:   allopurinol, 100 mg, Oral, Daily  aspirin, 81 mg, Oral, Daily  budesonide-formoterol, 2 puff, Inhalation, BID - RT  carbidopa-levodopa, 1 tablet, Oral, TID  cefdinir, 300 mg, Oral, Q12H  insulin lispro, 0-9 Units, Subcutaneous, Q6H  metoprolol tartrate, 50 mg, Oral, BID  nystatin, , Topical, TID  pantoprazole, 40 mg, Oral, BID AC  sodium chloride, 3 mL, Intravenous, Q12H  sodium chloride, 3 mL, Intravenous, Q12H          Assessment  Acute kidney injury - pre-renal, resolving     Nausea and vomiting  Status post EGD which showed a large complex  hiatal hernia and dilation of the fundus     Hypertension     Coronary artery disease     Urinary tract infection  Patient on ceftriaxone    Plan:  Cr improved to 1.02  Lytes and volume OK  Monitoring off IVF  Encouraging PO  Will follow        Saleem Drake MD  05/10/22  09:44 EDT  Tel: [phone]  Fax: [phone]

## 2022-05-11 NOTE — CASE MANAGEMENT/SOCIAL WORK
Case Management Discharge Note      Final Note: [facility]    Provided Post Acute Provider List?: N/A  N/A Provider List Comment: Current [facility]    Selected Continued Care - Discharged on 5/10/2022 Admission date: 5/5/2022 - Discharge disposition: Skilled Nursing Facility (DC - External)    Destination Coordination complete.    Service Provider Selected Services Address Phone Fax Patient Preferred    [facility] [address] [place] [address] [phone] -- --           Final Discharge Disposition Code: 04 - intermediate care facility

## 2022-06-03 ENCOUNTER — OFFICE VISIT (OUTPATIENT)
Dept: SURGERY | Facility: CLINIC | Age: 82
End: 2022-06-03

## 2022-06-03 VITALS — HEIGHT: 63 IN | HEART RATE: 59 BPM | TEMPERATURE: 98.6 F | OXYGEN SATURATION: 94 % | BODY MASS INDEX: 32.73 KG/M2

## 2022-06-03 DIAGNOSIS — K44.9 HIATAL HERNIA: Primary | ICD-10-CM

## 2022-06-03 PROCEDURE — 99212 OFFICE O/P EST SF 10 MIN: CPT | Performed by: SURGERY

## 2022-06-03 NOTE — PROGRESS NOTES
CHIEF COMPLAINT:    Chief Complaint   Patient presents with   • Follow-up     EGD and hospital stay       HISTORY OF PRESENT ILLNESS:    Nory Duff is a 82 y.o. female who was seen previously while in the hospital for vomiting.  She was noted to have a fairly large hiatal hernia that appeared to be the cause of her vomiting.  She initially was decompressed with an NG tube which was then removed.  The patient was trialed on diet and tolerated this without issue.  She was ultimately discharged without having surgical repair of the hiatal hernia.  She has been residing at her nursing facility since that time.  She is accompanied by her daughter today in the office.  They note no issues with oral diet.  No episodes of vomiting.    EXAM:  Vitals:    06/03/22 1431   Pulse: 59   Temp: 98.6 °F (37 °C)   SpO2: 94%         Abdomen soft    ASSESSMENT:    Hiatal hernia with recent episodes of vomiting    PLAN:    The patient was cleared by cardiology for abdominal surgery if needed.  Currently the patient and her daughter are in agreement that they would like to avoid surgery if possible which I believe is reasonable given her fairly frail overall state.  We did discuss that if she has further recurrence of symptoms that she may ultimately need to have repair of the hiatal hernia performed.  I discussed with them maintaining a soft diet with fairly small frequent meals throughout the day.  We discussed further follow-up.  They stated that they would prefer to call to arrange follow-up if she has further issues or if surgery is desired.          This document has been electronically signed by Sameer Lai MD on Aliya 3, 2022 15:01 EDT

## 2022-11-06 NOTE — CONSULTS
Cardiology Consult Note    Patient Identification:  Name: Nory Duff  Age: 82 y.o.  Sex: female  :  1940  MRN: [id number]             Requesting Physician :  Serge Patel,    Reason for Consultation / Chief Complaint : Preoperative cardiovascular evaluation    History of Present Illness:      Ms. Nory Duff has PMH of    CAD  Hypertension  Dyslipidemia  Diabetes  CKD  COPD  Parkinson's      Presented from [facility] 2022 with nausea vomiting.  Had CT showing large hiatal hernia.  Patient recently was in the hospital 2021 with GI bleed.  Patient denies any chest pain or shortness of breath.  Source of history is patient and chart.  Work-up here reveals labs from 2022 reveal A1c of 7.5 hemoglobin of 10.7.  Chest x-ray shows no acute cardiopulmonary abnormality.  There is large hiatal hernia present.  EKG done 2022 reviewed/interpreted by me reveals sinus rhythm with rate of 67 bpm        Assessment:  :    Preoperative cardiovascular evaluation  Nausea vomiting  Hiatal hernia  CAD without angina  Hypertension  Dyslipidemia  Type 2 diabetes  COPD  CKD      Recommendations / Plan:        Telemetry to monitor rhythm.  Patient is asymptomatic has normal EKG should be okay from cardiovascular standpoint for GI surgery.  Will follow along with you and consider further evaluation treatment.  Discussed with RN taking care of patient to coordinate care             Diagnosis Plan   1. Nausea and vomiting, unspecified vomiting type  Case Request    Case Request   2. Hiatal hernia     3. Urinary tract infection without hematuria, site unspecified                Past Medical History:  Past Medical History:   Diagnosis Date   • Anemia    • Atherosclerotic heart disease of native coronary artery without angina pectoris    • COPD (chronic obstructive pulmonary disease) (HCC)    • COVID-19 2020   • Dementia (HCC)    • Diabetes mellitus (HCC)    • GERD (gastroesophageal reflux disease)    • Gout   RCA Cine(s)  injected and visualized utilizing power injector system.   • Hyperlipidemia    • Hypertension    • Localized edema    • Parkinson's disease (HCC)    • Vitamin D deficiency      Past Surgical History:  Past Surgical History:   Procedure Laterality Date   • ENDOSCOPY N/A 1/2/2021    Procedure: ESOPHAGOGASTRODUODENOSCOPY;  Surgeon: Yoan Mendoza MD;  Location: [facility] ENDOSCOPY;  Service: Gastroenterology;  Laterality: N/A;  post: esophageal trauma from nasogastric tube, large hiatal hernia      Allergies:  Allergies   Allergen Reactions   • Codeine Anaphylaxis   • Latex Anaphylaxis   • Namenda [Memantine] Anaphylaxis     Home Meds:  Medications Prior to Admission   Medication Sig Dispense Refill Last Dose   • acetaminophen (TYLENOL) 325 MG tablet Take 650 mg by mouth Every 6 (Six) Hours As Needed for Mild Pain  or Fever.      • albuterol (PROVENTIL) (2.5 MG/3ML) 0.083% nebulizer solution Take 2.5 mg by nebulization Every 4 (Four) Hours As Needed for Wheezing. 30 mL 12    • allopurinol (ZYLOPRIM) 100 MG tablet Take 200 mg by mouth Daily.   5/5/2022 at Unknown time   • aspirin 81 MG EC tablet Take 81 mg by mouth Daily.   5/5/2022 at Unknown time   • budesonide-formoterol (Symbicort) 160-4.5 MCG/ACT inhaler Inhale 2 puffs 2 (Two) Times a Day. 1 each 12 5/5/2022 at Unknown time   • carbidopa-levodopa (SINEMET)  MG per tablet Take 1 tablet by mouth 3 (Three) Times a Day.   5/5/2022 at Unknown time   • clopidogrel (PLAVIX) 75 MG tablet Take 75 mg by mouth Daily.   5/5/2022 at Unknown time   • furosemide (LASIX) 20 MG tablet Take 1 tablet by mouth 2 (Two) Times a Day. q day x 7 days, check bmp in one week. Then increase to BID 30 tablet 0 5/5/2022 at Unknown time   • insulin glargine (LANTUS, SEMGLEE) 100 UNIT/ML injection Inject 20 Units under the skin into the appropriate area as directed Daily. 10 mL 12 5/5/2022 at Unknown time   • magnesium hydroxide (MILK OF MAGNESIA) 400 MG/5ML suspension Take 30 mL by mouth Daily As Needed for Constipation.      • metoprolol  tartrate (LOPRESSOR) 50 MG tablet Take 50 mg by mouth 2 (Two) Times a Day.   5/6/2022 at Unknown time   • nitroglycerin (NITROSTAT) 0.4 MG SL tablet Place 1 tablet under the tongue Every 5 (Five) Minutes As Needed for Chest Pain (Only if SBP Greater Than 100). Take no more than 3 doses in 15 minutes. 30 tablet 12    • nystatin (MYCOSTATIN) 077912 UNIT/GM powder Apply  topically to the appropriate area as directed 3 (Three) Times a Day. Under breasts/ abd folds   5/5/2022 at Unknown time   • Omega-3 Fatty Acids (fish oil) 1000 MG capsule capsule Take 1,000 mg by mouth Daily With Breakfast.   5/5/2022 at Unknown time   • pantoprazole (PROTONIX) 40 MG EC tablet Take 1 tablet by mouth Daily.   5/5/2022 at Unknown time   • selenium sulfide (SELSUN) 1 % lotion Apply  topically to the appropriate area as directed Every 7 (Seven) Days. Tuesday        Current Meds:     Current Facility-Administered Medications:   •  acetaminophen (TYLENOL) tablet 650 mg, 650 mg, Oral, Q6H PRN, Serge Patel MD, 650 mg at 05/07/22 0905  •  albuterol (PROVENTIL) nebulizer solution 0.083% 2.5 mg/3mL, 2.5 mg, Nebulization, Q4H PRN, Serge Patel MD  •  allopurinol (ZYLOPRIM) tablet 100 mg, 100 mg, Nasogastric, Daily, Serge Patel MD, 100 mg at 05/07/22 0905  •  aspirin chewable tablet 81 mg, 81 mg, Nasogastric, Daily, Serge Patel MD, 81 mg at 05/07/22 0905  •  budesonide-formoterol (SYMBICORT) 160-4.5 MCG/ACT inhaler 2 puff, 2 puff, Inhalation, BID - RT, Serge Patel MD, 2 puff at 05/07/22 2052  •  carbidopa-levodopa (SINEMET)  MG per tablet 1 tablet, 1 tablet, Nasogastric, TID, Serge Patel MD, 1 tablet at 05/07/22 2303  •  cefTRIAXone (ROCEPHIN) 1 g in sodium chloride 0.9 % 50 mL IVPB, 1 g, Intravenous, Q24H, Serge Patel MD, Last Rate: 100 mL/hr at 05/07/22 2302, 1 g at 05/07/22 2302  •  dextrose (D50W) (25 g/50 mL) IV injection 25 g, 25 g, Intravenous, Q15 Min PRN, Myah Haddad APRN  •  dextrose (GLUTOSE)  oral gel 15 g, 15 g, Oral, Q15 Min PRN, Myah Haddad APRN  •  glucagon (human recombinant) (GLUCAGEN DIAGNOSTIC) 1 mg in sterile water (preservative free) 1 mL injection, 1 mg, Subcutaneous, PRN, Myah Haddad APRN  •  insulin lispro (ADMELOG) injection 0-9 Units, 0-9 Units, Subcutaneous, Q6H **AND** insulin lispro (ADMELOG) injection 0-9 Units, 0-9 Units, Subcutaneous, PRN, Serge Patel MD  •  ipratropium-albuterol (DUO-NEB) nebulizer solution 3 mL, 3 mL, Nebulization, Q4H PRN, Myah Haddad APRN  •  magnesium hydroxide (MILK OF MAGNESIA) suspension 10 mL, 10 mL, Oral, Daily PRN, Serge Patel MD  •  metoprolol tartrate (LOPRESSOR) tablet 50 mg, 50 mg, Nasogastric, BID, Serge Patel MD, 50 mg at 05/07/22 2303  •  nitroglycerin (NITROSTAT) SL tablet 0.4 mg, 0.4 mg, Sublingual, Q5 Min PRN, Serge Patel MD  •  nystatin (MYCOSTATIN) powder, , Topical, TID, Serge Patel MD, Given at 05/07/22 2130  •  ondansetron (ZOFRAN) tablet 4 mg, 4 mg, Oral, Q6H PRN **OR** ondansetron (ZOFRAN) injection 4 mg, 4 mg, Intravenous, Q6H PRN, Myah Haddad APRN  •  pantoprazole (PROTONIX) injection 40 mg, 40 mg, Intravenous, Q12H, Saleem Rubin MD, 40 mg at 05/07/22 2303  •  [COMPLETED] Insert peripheral IV, , , Once **AND** sodium chloride 0.9 % flush 10 mL, 10 mL, Intravenous, PRN, Dipesh Vizcarra MD  •  [COMPLETED] Insert peripheral IV, , , Once **AND** sodium chloride 0.9 % flush 10 mL, 10 mL, Intravenous, PRN, Dipesh Vizcarra MD, 10 mL at 05/05/22 2337  •  sodium chloride 0.9 % flush 3 mL, 3 mL, Intravenous, Q12H, Myah Haddad APRN, 3 mL at 05/07/22 2130  •  sodium chloride 0.9 % flush 3 mL, 3 mL, Intravenous, Q12H, Camila Salvador APRN, 3 mL at 05/07/22 2130  •  sodium chloride 0.9 % flush 3-10 mL, 3-10 mL, Intravenous, PRN, Myah Haddad APRN  •  sodium chloride 0.9 % flush 3-10 mL, 3-10 mL, Intravenous, PRN, Camila Salvador APRN  •  sodium  "chloride 0.9 % infusion, 125 mL/hr, Intravenous, Continuous, Serge Patel MD, Last Rate: 125 mL/hr at 05/06/22 1311, 125 mL/hr at 05/06/22 1311  Social History:   Social History     Tobacco Use   • Smoking status: Never Smoker   • Smokeless tobacco: Never Used   Substance Use Topics   • Alcohol use: Never      Family History:  Family History   Family history unknown: Yes        Review of Systems : Review of Systems   Gastrointestinal: Positive for nausea and vomiting.   All other systems reviewed and are negative.                 Constitutional:  Temp:  [97.4 °F (36.3 °C)-98.3 °F (36.8 °C)] 98.3 °F (36.8 °C)  Heart Rate:  [61-72] 64  Resp:  [16-19] 18  BP: (125-155)/(70-86) 130/80    Physical Exam   /80   Pulse 64   Temp 98.3 °F (36.8 °C) (Oral)   Resp 18   Ht 160 cm (62.99\")   Wt 82.3 kg (181 lb 7 oz)   SpO2 95%   BMI 32.15 kg/m²   Physical Exam  General:  Appears in no acute distress  Eyes: Sclera is anicteric,  conjunctiva is clear   HEENT:  No JVD. Thyroid not visibly enlarged. No mucosal pallor or cyanosis  Respiratory: Respirations regular and unlabored at rest.  Clear to auscultation  Cardiovascular: S1,S2 Regular rate and rhythm. No murmur, rub or gallop auscultated. No pretibial pitting edema  Gastrointestinal: Abdomen soft, flat, non tender. Bowel sounds present.   Musculoskeletal:  No abnormal movements  Extremities: No digital clubbing or cyanosis  Skin: Color pink. Skin warm and dry to touch. No rashes  No xanthoma  Neuro: Alert and awake, no lateralizing deficits appreciated    Cardiographics  ECG: EKG tracing was  personally reviewed/interpreted by me  ECG 12 Lead   Preliminary Result   HEART RATE= 67  bpm   RR Interval= 884  ms   NJ Interval= 222  ms   P Horizontal Axis= -31  deg   P Front Axis= 60  deg   QRSD Interval= 91  ms   QT Interval= 365  ms   QRS Axis= -15  deg   T Wave Axis=   deg   - ABNORMAL ECG -   Sinus rhythm   Atrial premature complex   Prolonged NJ interval   Inferior " infarct, old   When compared with ECG of 14-Dec-2021 17:21:05,   Significant axis, voltage or hypertrophy change   Electronically Signed By:    Date and Time of Study: 2022-05-06 07:13:34          Telemetry: Sinus rhythm    Echocardiogram:   Results for orders placed during the hospital encounter of 12/14/21    Adult Transthoracic Echo Complete w/ Color, Spectral and Contrast if necessary per protocol    Interpretation Summary  · Left ventricular ejection fraction appears to be 61 - 65%.  · No pericardial effusion noted      Imaging  Chest X-ray:   Imaging Results (Last 24 Hours)     ** No results found for the last 24 hours. **          Lab Review: I have reviewed the labs          Results from last 7 days   Lab Units 05/08/22  0219   SODIUM mmol/L 141   POTASSIUM mmol/L 3.7   BUN mg/dL 13   CREATININE mg/dL 1.21*   CALCIUM mg/dL 8.4*     @[id number]@  Results from last 7 days   Lab Units 05/08/22  0220 05/07/22  0209 05/06/22  0315   WBC 10*3/mm3 8.80 8.70 10.80   HEMOGLOBIN g/dL 10.7* 10.8* 12.5   HEMATOCRIT % 32.2* 32.7* 37.6   PLATELETS 10*3/mm3 251 283 299                 Jasbir Parr MD  5/8/2022, 06:53 EDT      EMR Dragon/Transcription:   Dictated utilizing Dragon dictation

## 2023-01-15 ENCOUNTER — HOSPITAL ENCOUNTER (EMERGENCY)
Facility: HOSPITAL | Age: 83
Discharge: NURSING, REHAB OR LONG-TERM CARE | End: 2023-01-16
Attending: EMERGENCY MEDICINE | Admitting: EMERGENCY MEDICINE
Payer: MEDICARE

## 2023-01-15 ENCOUNTER — APPOINTMENT (OUTPATIENT)
Dept: CT IMAGING | Facility: HOSPITAL | Age: 83
End: 2023-01-15
Payer: MEDICARE

## 2023-01-15 ENCOUNTER — APPOINTMENT (OUTPATIENT)
Dept: GENERAL RADIOLOGY | Facility: HOSPITAL | Age: 83
End: 2023-01-15
Payer: MEDICARE

## 2023-01-15 DIAGNOSIS — I21.4 NSTEMI (NON-ST ELEVATED MYOCARDIAL INFARCTION): Primary | ICD-10-CM

## 2023-01-15 DIAGNOSIS — R41.82 ALTERED MENTAL STATUS, UNSPECIFIED ALTERED MENTAL STATUS TYPE: ICD-10-CM

## 2023-01-15 DIAGNOSIS — N17.9 ACUTE KIDNEY INJURY: ICD-10-CM

## 2023-01-15 LAB
ALBUMIN SERPL-MCNC: 3.3 G/DL (ref 3.5–5.2)
ALBUMIN/GLOB SERPL: 0.9 G/DL
ALP SERPL-CCNC: 85 U/L (ref 39–117)
ALT SERPL W P-5'-P-CCNC: <5 U/L (ref 1–33)
ANION GAP SERPL CALCULATED.3IONS-SCNC: 9 MMOL/L (ref 5–15)
APTT PPP: 46.4 SECONDS (ref 61–76.5)
AST SERPL-CCNC: 13 U/L (ref 1–32)
BACTERIA UR QL AUTO: ABNORMAL /HPF
BASOPHILS # BLD AUTO: 0 10*3/MM3 (ref 0–0.2)
BASOPHILS NFR BLD AUTO: 0.6 % (ref 0–1.5)
BILIRUB SERPL-MCNC: 0.3 MG/DL (ref 0–1.2)
BILIRUB UR QL STRIP: NEGATIVE
BUN SERPL-MCNC: 33 MG/DL (ref 8–23)
BUN/CREAT SERPL: 17.8 (ref 7–25)
CALCIUM SPEC-SCNC: 9.2 MG/DL (ref 8.6–10.5)
CHLORIDE SERPL-SCNC: 99 MMOL/L (ref 98–107)
CLARITY UR: CLEAR
CO2 SERPL-SCNC: 29 MMOL/L (ref 22–29)
COLOR UR: YELLOW
CREAT SERPL-MCNC: 1.85 MG/DL (ref 0.57–1)
DEPRECATED RDW RBC AUTO: 49.4 FL (ref 37–54)
EGFRCR SERPLBLD CKD-EPI 2021: 26.6 ML/MIN/1.73
EOSINOPHIL # BLD AUTO: 0 10*3/MM3 (ref 0–0.4)
EOSINOPHIL NFR BLD AUTO: 0.6 % (ref 0.3–6.2)
ERYTHROCYTE [DISTWIDTH] IN BLOOD BY AUTOMATED COUNT: 15.5 % (ref 12.3–15.4)
GLOBULIN UR ELPH-MCNC: 3.7 GM/DL
GLUCOSE SERPL-MCNC: 110 MG/DL (ref 65–99)
GLUCOSE UR STRIP-MCNC: NEGATIVE MG/DL
HCT VFR BLD AUTO: 34.1 % (ref 34–46.6)
HGB BLD-MCNC: 11.2 G/DL (ref 12–15.9)
HGB UR QL STRIP.AUTO: ABNORMAL
HOLD SPECIMEN: NORMAL
HOLD SPECIMEN: NORMAL
HYALINE CASTS UR QL AUTO: ABNORMAL /LPF
INR PPP: 1.72 (ref 0.93–1.1)
KETONES UR QL STRIP: NEGATIVE
LEUKOCYTE ESTERASE UR QL STRIP.AUTO: NEGATIVE
LIPASE SERPL-CCNC: 8 U/L (ref 13–60)
LYMPHOCYTES # BLD AUTO: 1.7 10*3/MM3 (ref 0.7–3.1)
LYMPHOCYTES NFR BLD AUTO: 20.7 % (ref 19.6–45.3)
MCH RBC QN AUTO: 30.3 PG (ref 26.6–33)
MCHC RBC AUTO-ENTMCNC: 32.8 G/DL (ref 31.5–35.7)
MCV RBC AUTO: 92.4 FL (ref 79–97)
MONOCYTES # BLD AUTO: 0.7 10*3/MM3 (ref 0.1–0.9)
MONOCYTES NFR BLD AUTO: 9 % (ref 5–12)
NEUTROPHILS NFR BLD AUTO: 5.6 10*3/MM3 (ref 1.7–7)
NEUTROPHILS NFR BLD AUTO: 69.1 % (ref 42.7–76)
NITRITE UR QL STRIP: NEGATIVE
NRBC BLD AUTO-RTO: 0 /100 WBC (ref 0–0.2)
PH UR STRIP.AUTO: 6 [PH] (ref 5–8)
PLATELET # BLD AUTO: 370 10*3/MM3 (ref 140–450)
PMV BLD AUTO: 8 FL (ref 6–12)
POTASSIUM SERPL-SCNC: 4.1 MMOL/L (ref 3.5–5.2)
PROT SERPL-MCNC: 7 G/DL (ref 6–8.5)
PROT UR QL STRIP: NEGATIVE
PROTHROMBIN TIME: 17.2 SECONDS (ref 9.6–11.7)
RBC # BLD AUTO: 3.68 10*6/MM3 (ref 3.77–5.28)
RBC # UR STRIP: ABNORMAL /HPF
REF LAB TEST METHOD: ABNORMAL
SODIUM SERPL-SCNC: 137 MMOL/L (ref 136–145)
SP GR UR STRIP: >=1.03 (ref 1–1.03)
SQUAMOUS #/AREA URNS HPF: ABNORMAL /HPF
TROPONIN T SERPL-MCNC: 2.51 NG/ML (ref 0–0.03)
UROBILINOGEN UR QL STRIP: ABNORMAL
WBC # UR STRIP: ABNORMAL /HPF
WBC NRBC COR # BLD: 8.1 10*3/MM3 (ref 3.4–10.8)
WHOLE BLOOD HOLD COAG: NORMAL
WHOLE BLOOD HOLD SPECIMEN: NORMAL

## 2023-01-15 PROCEDURE — 99285 EMERGENCY DEPT VISIT HI MDM: CPT

## 2023-01-15 PROCEDURE — 80053 COMPREHEN METABOLIC PANEL: CPT | Performed by: EMERGENCY MEDICINE

## 2023-01-15 PROCEDURE — 71045 X-RAY EXAM CHEST 1 VIEW: CPT

## 2023-01-15 PROCEDURE — 70450 CT HEAD/BRAIN W/O DYE: CPT

## 2023-01-15 PROCEDURE — 84484 ASSAY OF TROPONIN QUANT: CPT | Performed by: EMERGENCY MEDICINE

## 2023-01-15 PROCEDURE — 81001 URINALYSIS AUTO W/SCOPE: CPT | Performed by: EMERGENCY MEDICINE

## 2023-01-15 PROCEDURE — 85025 COMPLETE CBC W/AUTO DIFF WBC: CPT | Performed by: EMERGENCY MEDICINE

## 2023-01-15 PROCEDURE — 93005 ELECTROCARDIOGRAM TRACING: CPT

## 2023-01-15 PROCEDURE — P9612 CATHETERIZE FOR URINE SPEC: HCPCS

## 2023-01-15 PROCEDURE — 85730 THROMBOPLASTIN TIME PARTIAL: CPT | Performed by: EMERGENCY MEDICINE

## 2023-01-15 PROCEDURE — 36415 COLL VENOUS BLD VENIPUNCTURE: CPT

## 2023-01-15 PROCEDURE — 96375 TX/PRO/DX INJ NEW DRUG ADDON: CPT

## 2023-01-15 PROCEDURE — 25010000002 ONDANSETRON PER 1 MG: Performed by: EMERGENCY MEDICINE

## 2023-01-15 PROCEDURE — 83690 ASSAY OF LIPASE: CPT | Performed by: EMERGENCY MEDICINE

## 2023-01-15 PROCEDURE — 85610 PROTHROMBIN TIME: CPT | Performed by: EMERGENCY MEDICINE

## 2023-01-15 RX ORDER — HEPARIN SODIUM 10000 [USP'U]/100ML
11.5 INJECTION, SOLUTION INTRAVENOUS
Status: DISCONTINUED | OUTPATIENT
Start: 2023-01-15 | End: 2023-01-16

## 2023-01-15 RX ORDER — ASPIRIN 325 MG
325 TABLET ORAL ONCE
Status: COMPLETED | OUTPATIENT
Start: 2023-01-15 | End: 2023-01-15

## 2023-01-15 RX ORDER — SODIUM CHLORIDE 0.9 % (FLUSH) 0.9 %
10 SYRINGE (ML) INJECTION AS NEEDED
Status: DISCONTINUED | OUTPATIENT
Start: 2023-01-15 | End: 2023-01-16 | Stop reason: HOSPADM

## 2023-01-15 RX ORDER — ONDANSETRON 2 MG/ML
4 INJECTION INTRAMUSCULAR; INTRAVENOUS ONCE
Status: COMPLETED | OUTPATIENT
Start: 2023-01-15 | End: 2023-01-15

## 2023-01-15 RX ADMIN — ASPIRIN 325 MG: 325 TABLET ORAL at 23:33

## 2023-01-15 RX ADMIN — ONDANSETRON 4 MG: 2 INJECTION INTRAMUSCULAR; INTRAVENOUS at 23:33

## 2023-01-15 NOTE — Clinical Note
Level of Care: Progressive Care [20]   Diagnosis: AMS (altered mental status) [6245344]   Admitting Physician: NADYA MARTINEZ [[id number]]   Attending Physician: NADYA MARTINEZ [[id number]]   Bed Request Comments: PCU   Certification: I Certify That Inpatient Hospital Services Are Medically Necessary For Greater Than 2 Midnights

## 2023-01-16 VITALS
HEIGHT: 63 IN | SYSTOLIC BLOOD PRESSURE: 113 MMHG | TEMPERATURE: 95.3 F | WEIGHT: 191.1 LBS | RESPIRATION RATE: 12 BRPM | OXYGEN SATURATION: 99 % | BODY MASS INDEX: 33.86 KG/M2 | DIASTOLIC BLOOD PRESSURE: 54 MMHG | HEART RATE: 60 BPM

## 2023-01-16 PROBLEM — N17.9 ACUTE ON CHRONIC RENAL FAILURE: Status: ACTIVE | Noted: 2023-01-16

## 2023-01-16 PROBLEM — R77.8 ELEVATED TROPONIN: Status: ACTIVE | Noted: 2023-01-16

## 2023-01-16 PROBLEM — I21.4 NSTEMI (NON-ST ELEVATED MYOCARDIAL INFARCTION): Status: ACTIVE | Noted: 2023-01-16

## 2023-01-16 PROBLEM — N18.9 ACUTE ON CHRONIC RENAL FAILURE: Status: ACTIVE | Noted: 2023-01-16

## 2023-01-16 LAB
ANION GAP SERPL CALCULATED.3IONS-SCNC: 7 MMOL/L (ref 5–15)
APTT PPP: >139 SECONDS (ref 61–76.5)
BUN SERPL-MCNC: 33 MG/DL (ref 8–23)
BUN/CREAT SERPL: 17.7 (ref 7–25)
CALCIUM SPEC-SCNC: 9 MG/DL (ref 8.6–10.5)
CHLORIDE SERPL-SCNC: 101 MMOL/L (ref 98–107)
CHOLEST SERPL-MCNC: 171 MG/DL (ref 0–200)
CO2 SERPL-SCNC: 29 MMOL/L (ref 22–29)
CREAT SERPL-MCNC: 1.86 MG/DL (ref 0.57–1)
DEPRECATED RDW RBC AUTO: 52.1 FL (ref 37–54)
EGFRCR SERPLBLD CKD-EPI 2021: 26.4 ML/MIN/1.73
ERYTHROCYTE [DISTWIDTH] IN BLOOD BY AUTOMATED COUNT: 15.6 % (ref 12.3–15.4)
GLUCOSE BLDC GLUCOMTR-MCNC: 97 MG/DL (ref 70–105)
GLUCOSE BLDC GLUCOMTR-MCNC: 98 MG/DL (ref 70–105)
GLUCOSE SERPL-MCNC: 109 MG/DL (ref 65–99)
HCT VFR BLD AUTO: 31.1 % (ref 34–46.6)
HDLC SERPL-MCNC: 35 MG/DL (ref 40–60)
HGB BLD-MCNC: 10.2 G/DL (ref 12–15.9)
LDLC SERPL CALC-MCNC: 121 MG/DL (ref 0–100)
LDLC/HDLC SERPL: 3.43 {RATIO}
MCH RBC QN AUTO: 30 PG (ref 26.6–33)
MCHC RBC AUTO-ENTMCNC: 32.8 G/DL (ref 31.5–35.7)
MCV RBC AUTO: 91.6 FL (ref 79–97)
PLATELET # BLD AUTO: 327 10*3/MM3 (ref 140–450)
PMV BLD AUTO: 8.1 FL (ref 6–12)
POTASSIUM SERPL-SCNC: 4.4 MMOL/L (ref 3.5–5.2)
RBC # BLD AUTO: 3.4 10*6/MM3 (ref 3.77–5.28)
SODIUM SERPL-SCNC: 137 MMOL/L (ref 136–145)
TRIGL SERPL-MCNC: 80 MG/DL (ref 0–150)
TROPONIN T SERPL-MCNC: 1.87 NG/ML (ref 0–0.03)
TROPONIN T SERPL-MCNC: 2.07 NG/ML (ref 0–0.03)
TSH SERPL DL<=0.05 MIU/L-ACNC: 4.29 UIU/ML (ref 0.27–4.2)
VLDLC SERPL-MCNC: 15 MG/DL (ref 5–40)
WBC NRBC COR # BLD: 6.8 10*3/MM3 (ref 3.4–10.8)

## 2023-01-16 PROCEDURE — 85730 THROMBOPLASTIN TIME PARTIAL: CPT | Performed by: HOSPITALIST

## 2023-01-16 PROCEDURE — 84484 ASSAY OF TROPONIN QUANT: CPT | Performed by: NURSE PRACTITIONER

## 2023-01-16 PROCEDURE — 82962 GLUCOSE BLOOD TEST: CPT

## 2023-01-16 PROCEDURE — 96361 HYDRATE IV INFUSION ADD-ON: CPT

## 2023-01-16 PROCEDURE — 80061 LIPID PANEL: CPT | Performed by: FAMILY MEDICINE

## 2023-01-16 PROCEDURE — 84443 ASSAY THYROID STIM HORMONE: CPT | Performed by: FAMILY MEDICINE

## 2023-01-16 PROCEDURE — 25010000002 HEPARIN (PORCINE) 25000-0.45 UT/250ML-% SOLUTION: Performed by: EMERGENCY MEDICINE

## 2023-01-16 PROCEDURE — 80048 BASIC METABOLIC PNL TOTAL CA: CPT | Performed by: NURSE PRACTITIONER

## 2023-01-16 PROCEDURE — 36415 COLL VENOUS BLD VENIPUNCTURE: CPT | Performed by: FAMILY MEDICINE

## 2023-01-16 PROCEDURE — 99215 OFFICE O/P EST HI 40 MIN: CPT

## 2023-01-16 PROCEDURE — 96365 THER/PROPH/DIAG IV INF INIT: CPT

## 2023-01-16 PROCEDURE — 96366 THER/PROPH/DIAG IV INF ADDON: CPT

## 2023-01-16 PROCEDURE — 85027 COMPLETE CBC AUTOMATED: CPT | Performed by: NURSE PRACTITIONER

## 2023-01-16 RX ORDER — ONDANSETRON 4 MG/1
4 TABLET, FILM COATED ORAL EVERY 6 HOURS PRN
Status: DISCONTINUED | OUTPATIENT
Start: 2023-01-16 | End: 2023-01-16 | Stop reason: HOSPADM

## 2023-01-16 RX ORDER — DEXTROSE MONOHYDRATE 25 G/50ML
25 INJECTION, SOLUTION INTRAVENOUS
Status: DISCONTINUED | OUTPATIENT
Start: 2023-01-16 | End: 2023-01-16 | Stop reason: HOSPADM

## 2023-01-16 RX ORDER — OLANZAPINE 10 MG/2ML
1 INJECTION, POWDER, LYOPHILIZED, FOR SOLUTION INTRAMUSCULAR
Status: DISCONTINUED | OUTPATIENT
Start: 2023-01-16 | End: 2023-01-16 | Stop reason: HOSPADM

## 2023-01-16 RX ORDER — NITROGLYCERIN 0.4 MG/1
0.4 TABLET SUBLINGUAL
Status: DISCONTINUED | OUTPATIENT
Start: 2023-01-16 | End: 2023-01-16 | Stop reason: HOSPADM

## 2023-01-16 RX ORDER — ATORVASTATIN CALCIUM 40 MG/1
40 TABLET, FILM COATED ORAL DAILY
Qty: 30 TABLET | Refills: 0 | Status: CANCELLED | OUTPATIENT
Start: 2023-01-16

## 2023-01-16 RX ORDER — FUROSEMIDE 20 MG/1
20 TABLET ORAL 2 TIMES DAILY
COMMUNITY
End: 2023-01-24 | Stop reason: HOSPADM

## 2023-01-16 RX ORDER — IPRATROPIUM BROMIDE AND ALBUTEROL SULFATE 2.5; .5 MG/3ML; MG/3ML
3 SOLUTION RESPIRATORY (INHALATION) EVERY 4 HOURS PRN
COMMUNITY

## 2023-01-16 RX ORDER — SODIUM CHLORIDE 0.9 % (FLUSH) 0.9 %
3-10 SYRINGE (ML) INJECTION AS NEEDED
Status: DISCONTINUED | OUTPATIENT
Start: 2023-01-16 | End: 2023-01-16 | Stop reason: HOSPADM

## 2023-01-16 RX ORDER — ATORVASTATIN CALCIUM 20 MG/1
20 TABLET, FILM COATED ORAL NIGHTLY
Qty: 30 TABLET | Refills: 0
Start: 2023-01-16

## 2023-01-16 RX ORDER — INSULIN GLARGINE 100 [IU]/ML
20 INJECTION, SOLUTION SUBCUTANEOUS EVERY MORNING
Status: ON HOLD | COMMUNITY
End: 2023-01-24 | Stop reason: SDUPTHER

## 2023-01-16 RX ORDER — SODIUM CHLORIDE 9 MG/ML
40 INJECTION, SOLUTION INTRAVENOUS AS NEEDED
Status: DISCONTINUED | OUTPATIENT
Start: 2023-01-16 | End: 2023-01-16 | Stop reason: HOSPADM

## 2023-01-16 RX ORDER — NICOTINE POLACRILEX 4 MG
15 LOZENGE BUCCAL
Status: DISCONTINUED | OUTPATIENT
Start: 2023-01-16 | End: 2023-01-16 | Stop reason: HOSPADM

## 2023-01-16 RX ORDER — ASPIRIN 81 MG/1
81 TABLET ORAL DAILY
Status: DISCONTINUED | OUTPATIENT
Start: 2023-01-16 | End: 2023-01-16 | Stop reason: HOSPADM

## 2023-01-16 RX ORDER — ONDANSETRON 2 MG/ML
4 INJECTION INTRAMUSCULAR; INTRAVENOUS EVERY 6 HOURS PRN
Status: DISCONTINUED | OUTPATIENT
Start: 2023-01-16 | End: 2023-01-16 | Stop reason: HOSPADM

## 2023-01-16 RX ORDER — CLOPIDOGREL BISULFATE 75 MG/1
75 TABLET ORAL DAILY
Status: DISCONTINUED | OUTPATIENT
Start: 2023-01-16 | End: 2023-01-16 | Stop reason: HOSPADM

## 2023-01-16 RX ORDER — INSULIN LISPRO 100 [IU]/ML
2-7 INJECTION, SOLUTION INTRAVENOUS; SUBCUTANEOUS EVERY 6 HOURS SCHEDULED
Status: DISCONTINUED | OUTPATIENT
Start: 2023-01-16 | End: 2023-01-16 | Stop reason: HOSPADM

## 2023-01-16 RX ORDER — ATORVASTATIN CALCIUM 20 MG/1
20 TABLET, FILM COATED ORAL NIGHTLY
Status: DISCONTINUED | OUTPATIENT
Start: 2023-01-16 | End: 2023-01-16 | Stop reason: HOSPADM

## 2023-01-16 RX ORDER — SODIUM CHLORIDE 9 MG/ML
75 INJECTION, SOLUTION INTRAVENOUS CONTINUOUS
Status: DISCONTINUED | OUTPATIENT
Start: 2023-01-16 | End: 2023-01-16 | Stop reason: HOSPADM

## 2023-01-16 RX ORDER — LEVOFLOXACIN 750 MG/1
750 TABLET ORAL NIGHTLY
COMMUNITY
Start: 2023-01-10 | End: 2023-01-16

## 2023-01-16 RX ORDER — SODIUM CHLORIDE 0.9 % (FLUSH) 0.9 %
3 SYRINGE (ML) INJECTION EVERY 12 HOURS SCHEDULED
Status: DISCONTINUED | OUTPATIENT
Start: 2023-01-16 | End: 2023-01-16 | Stop reason: HOSPADM

## 2023-01-16 RX ADMIN — CLOPIDOGREL BISULFATE 75 MG: 75 TABLET ORAL at 14:35

## 2023-01-16 RX ADMIN — SODIUM CHLORIDE 75 ML/HR: 9 INJECTION, SOLUTION INTRAVENOUS at 02:19

## 2023-01-16 RX ADMIN — Medication 3 ML: at 08:17

## 2023-01-16 RX ADMIN — HEPARIN SODIUM 11.5 UNITS/KG/HR: 10000 INJECTION, SOLUTION INTRAVENOUS at 00:00

## 2023-01-16 RX ADMIN — ASPIRIN 81 MG: 81 TABLET, COATED ORAL at 14:35

## 2023-01-16 RX ADMIN — METOPROLOL TARTRATE 25 MG: 25 TABLET, FILM COATED ORAL at 14:35

## 2023-01-16 NOTE — CASE MANAGEMENT/SOCIAL WORK
Discharge Planning Assessment  [facility]     Patient Name: Nory Duff  MRN: [id number]  Today's Date: 1/16/2023    Admit Date: 1/15/2023    Plan[facility] DC Barrier: Cardiology consult, Heparin gtt.   Discharge Needs Assessment     Row Name 01/16/23 1011       Living Environment    People in Home other (see comments)    Current Living Arrangements extended care facility  [facility]    Primary Care Provided by other (see comments)    Provides Primary Care For no one, unable/limited ability to care for self    Family Caregiver if Needed child(elizabeth), adult    Family Caregiver Names REESE-Evelyn(daughter)    Quality of Family Relationships helpful;involved;supportive    Able to Return to Prior Arrangements yes  Long term bed held at [facility]       Resource/Environmental Concerns    Resource/Environmental Concerns none    Transportation Concerns none       Transition Planning    Patient/Family Anticipates Transition to long-term care facility  [facility]    Transportation Anticipated health plan transportation  [facility] will transport if able. Daughter (POA) is back up plan       Discharge Needs Assessment    Equipment Currently Used at Home wheelchair    Concerns to be Addressed discharge planning               Discharge Plan     Row Name 01/16/23 1015       Plan    Plan Return to [facility] DC Barrier: Cardiology consult, Heparin gtt.    Plan Comments Spoke with patient, and ayad Dahl at bedside, spoke with REESE Rivas by phone. Transferred from [facility] and plan to return, confirmed LT bed is being held with liasion.[facility] will assist with transportion back if able. Spoke with POA daughter who said she can be a back up plan to assist with return transportation.              Continued Care and Services - Admitted Since 1/15/2023     Destination Coordination complete.    Service Provider Request Status Selected Services Address  Phone Fax Patient Preferred    [facility]  Selected Nursing Home [address] [place] [id number] [phone] --              Expected Discharge Date and Time     Expected Discharge Date Expected Discharge Time    Jan 18, 2023          Demographic Summary     Row Name 01/16/23 1010       General Information    Admission Type inpatient    Required Notices Provided Important Message from Medicare    Referral Source emergency department    Reason for Consult discharge planning    Preferred Language English               Functional Status     Row Name 01/16/23 1010       Functional Status    Usual Activity Tolerance poor    Current Activity Tolerance poor       Functional Status, IADL    Medications completely dependent    Meal Preparation completely dependent    Housekeeping completely dependent    Laundry completely dependent    Shopping completely dependent       Mental Status    General Appearance WDL WDL              Patient Forms     Row Name 01/16/23 0958       Patient Forms    Important Message from Medicare ([facility]) Delivered  1/16 Registration         Met with patient in room wearing PPE: mask, face shield/goggles, gloves, gown.      Maintained distance greater than six feet and spent less than 15 minutes in the room.        Alexandria Jones RN

## 2023-01-16 NOTE — DISCHARGE SUMMARY
[facility] Medicine Services  Discharge Summary    Date of Service: 2023  Patient Name: Nory Duff  : 1940  MRN: [id number]    Date of Admission: 1/15/2023  Discharge Diagnosis: NSTEMI  Date of Discharge:  2023  Primary Care Physician: Giovani Mejia MD      Presenting Problem:   AMS (altered mental status) [R41.82]    Active and Resolved Hospital Problems:  Active Hospital Problems    Diagnosis POA   • **AMS (altered mental status) [R41.82] Yes   • Elevated troponin [R77.8] Yes   • Acute on chronic renal failure (HCC) [N17.9, N18.9] Yes   • NSTEMI (non-ST elevated myocardial infarction) (HCC) [I21.4] Yes   • COPD (chronic obstructive pulmonary disease) ([facility]) [J44.9] Yes   • GERD (gastroesophageal reflux disease) [K21.9] Yes   • Hypertension [I10] Yes   • Dementia ([facility]) [F03.90] Yes   • Parkinson's disease ([facility]) [G20] Yes   • Cardiomegaly [I51.7] Yes   • Hyperlipidemia [E78.5] Yes   • Type 2 diabetes mellitus without complication, with long-term current use of insulin ([facility]) [E11.9, Z79.4] Not Applicable   • Anemia in other chronic diseases classified elsewhere [D63.8] Yes      Resolved Hospital Problems   No resolved problems to display.         Hospital Course     HPI:  Nory Duff is a very pleasant  82 y.o. female who presented to [facility] on 1/15/2023 from [facility] facility with reports of increased confusion and altered mental status.  She is awake and alert pleasant and cooperative and is oriented to self.  Daughter at bedside assisting with history.  Daughter reports she has a past medical history of dementia and is often confused especially at night.  CT head per radiology showed no acute process.  Urinalysis negative for infection.  Labs did show an elevated troponin at 2.510.  EKG shows no ST elevation.  She reported some chest pain upon arrival to ED.  She denies any now but is aware she often forgets.  Glucose  is 110 with a past medical history of insulin-dependent diabetes mellitus type 2.  Creatinine 1.85 with a past medical history of chronic kidney disease stage unknown which appears to be above her normal baseline.  Chest x-ray shows cardiomegaly with no acute process WBC is not elevated.  Hemoglobin 11.2 with a history of anemia which is improved from previous.  Cardiology was consulted from the emergency department and she was placed on a heparin drip.  She is a DNR per daughter and documentation.  Daughter reports they would not be interested in a cardiac catheterization.  Past medical history also includes Parkinson's disease, COPD not on home oxygen, hypertension hyperlipidemia.  She will be admitted for further evaluation and treatment.    Hospital course:  She was admitted in consultation with cardiology for further evaluation and treatment.  She denied any chest pain today.  Her troponin levels were downtrending once again.  Her mentation is at baseline.  After talking with cardiology, the patient and her family declined any further invasive intervention.  Unclear etiology for her altered mental status unless she did have a significant NSTEMI.  Either way, she is at her baseline.  There is no evidence of infection.  Lipid panel showed an LDL of 121 with a total cholesterol of 171 and slightly low HDL.  Her triglycerides look good.  TSH is a little elevated at 4.29.  She will be discharged on aspirin and statin therapy.  She is already on a beta-blocker.  She will discharge back to her long-term care facility today.    Day of Discharge     Vital Signs:  Temp:  [95.3 °F (35.2 °C)-97.2 °F (36.2 °C)] 95.3 °F (35.2 °C)  Heart Rate:  [58-67] 60  Resp:  [12-19] 12  BP: (100-118)/(41-91) 113/54    Physical Exam:  Physical Exam:  GEN: Elderly debilitated woman, no acute distress  HEENT: NCAT, PERRLA, puffy lower eyelids, dry mucous membranes  NECK: Supple, midline trachea  CARD: RRR, no appreciable M/R/G, no  peripheral edema  PULM: CTAB, non-distressed  ABD: soft, NTND, normoactive bowel sounds throughout  SKIN: Warm, dry  NEURO: Grossly intact, non-focal exam  PSYCH: Pleasantly confused    Pertinent  and/or Most Recent Results     LAB RESULTS:      Lab 01/16/23  0544 01/15/23  2220   WBC 6.80 8.10   HEMOGLOBIN 10.2* 11.2*   HEMATOCRIT 31.1* 34.1   PLATELETS 327 370   NEUTROS ABS  --  5.60   LYMPHS ABS  --  1.70   MONOS ABS  --  0.70   EOS ABS  --  0.00   MCV 91.6 92.4   PROTIME  --  17.2*   APTT >139.0* 46.4*         Lab 01/16/23  1209 01/16/23  0544 01/15/23  2220   SODIUM  --  137 137   POTASSIUM  --  4.4 4.1   CHLORIDE  --  101 99   CO2  --  29.0 29.0   ANION GAP  --  7.0 9.0   BUN  --  33* 33*   CREATININE  --  1.86* 1.85*   EGFR  --  26.4* 26.6*   GLUCOSE  --  109* 110*   CALCIUM  --  9.0 9.2   TSH 4.290*  --   --          Lab 01/15/23  2220   TOTAL PROTEIN 7.0   ALBUMIN 3.3*   GLOBULIN 3.7   ALT (SGPT) <5   AST (SGOT) 13   BILIRUBIN 0.3   ALK PHOS 85   LIPASE 8*         Lab 01/16/23  1209 01/16/23  0544 01/15/23  2220   TROPONIN T 1.870* 2.070* 2.510*   PROTIME  --   --  17.2*   INR  --   --  1.72*         Lab 01/16/23  1209   CHOLESTEROL 171   LDL CHOL 121*   HDL CHOL 35*   TRIGLYCERIDES 80             Brief Urine Lab Results  (Last result in the past 365 days)      Color   Clarity   Blood   Leuk Est   Nitrite   Protein   CREAT   Urine HCG        01/15/23 2246 Yellow   Clear   Trace   Negative   Negative   Negative               Microbiology Results (last 10 days)     ** No results found for the last 240 hours. **          CT Head Without Contrast    Result Date: 1/15/2023  Impression: 1. No acute intracranial abnormality visible by CT. 2. Small chronic lacunar infarct in the left corona radiata and moderate chronic small vessel ischemic changes in the white matter.  This examination was interpreted by Saleem Denise M.D. Electronically signed by:  Saleem Denise M.D.  1/15/2023 9:34 PM Encampment Time    XR  Chest 1 View    Result Date: 1/16/2023  Impression: Impression: 1. Stable cardiomegaly. 2. Large hiatal hernia. 3. Hypoinflated lungs with mild bibasilar atelectasis. Electronically Signed: Ignacio Humera  1/16/2023 7:04 AM EST  Workstation ID: [id number]      Results for orders placed during the hospital encounter of 05/05/22    Adult Transthoracic Echo Complete w/ Color, Spectral and Contrast if Necessary Per Protocol    Interpretation Summary  · Left ventricular ejection fraction appears to be 56 - 60%.  · No pericardial effusion noted    Consults:   Consults     Date and Time Order Name Status Description    1/15/2023 11:41 PM Hospitalist (on-call MD unless specified)      1/15/2023 11:40 PM Cardiology (on-call MD unless specified) Completed           Discharge Details        Discharge Medications      New Medications      Instructions Start Date   atorvastatin 20 MG tablet  Commonly known as: LIPITOR   20 mg, Oral, Nightly         Changes to Medications      Instructions Start Date   metoprolol tartrate 25 MG tablet  Commonly known as: LOPRESSOR  What changed:   · medication strength  · how much to take   25 mg, Oral, 2 Times Daily         Continue These Medications      Instructions Start Date   acetaminophen 325 MG tablet  Commonly known as: TYLENOL   650 mg, Oral, Every 6 Hours PRN      allopurinol 100 MG tablet  Commonly known as: ZYLOPRIM   200 mg, Oral, Daily      aspirin 81 MG EC tablet   81 mg, Oral, Daily      budesonide-formoterol 160-4.5 MCG/ACT inhaler  Commonly known as: Symbicort   2 puffs, Inhalation, 2 Times Daily - RT      carbidopa-levodopa  MG per tablet  Commonly known as: SINEMET   1 tablet, Oral, 3 Times Daily      clopidogrel 75 MG tablet  Commonly known as: PLAVIX   75 mg, Oral, Daily      fish oil 1000 MG capsule capsule   1,000 mg, Oral, Daily With Breakfast      furosemide 20 MG tablet  Commonly known as: LASIX   20 mg, Oral, 2 Times Daily      insulin glargine 100 UNIT/ML  injection  Commonly known as: LANTUS, SEMGLEE   20 Units, Subcutaneous, Every Morning      ipratropium-albuterol 0.5-2.5 mg/3 ml nebulizer  Commonly known as: DUO-NEB   3 mL, Nebulization, Every 4 Hours PRN      magnesium hydroxide 400 MG/5ML suspension  Commonly known as: MILK OF MAGNESIA   30 mL, Oral, Daily PRN      nitroglycerin 0.4 MG SL tablet  Commonly known as: NITROSTAT   0.4 mg, Sublingual, Every 5 Minutes PRN, Take no more than 3 doses in 15 minutes.      ondansetron 4 MG tablet  Commonly known as: ZOFRAN   4 mg, Oral, Every 6 Hours PRN      pantoprazole 40 MG EC tablet  Commonly known as: PROTONIX   40 mg, Oral, Daily             Allergies   Allergen Reactions   • Codeine Anaphylaxis   • Latex Anaphylaxis   • Namenda [Memantine] Anaphylaxis       Discharge Disposition:   Long Term Care (DC - External)    Diet:  Hospital:  Diet Order   Procedures   • Diet: Cardiac Diets, Diabetic Diets; Healthy Heart (2-3 Na+); Consistent Carbohydrate; Texture: Regular Texture (IDDSI 7); Fluid Consistency: Thin (IDDSI 0)       Discharge Activity:   Activity Instructions     Activity as Tolerated            CODE STATUS:  Code Status and Medical Interventions:   Ordered at: 01/16/23 0008     Code Status (Patient has no pulse and is not breathing):    No CPR (Do Not Attempt to Resuscitate)     Medical Interventions (Patient has pulse or is breathing):    Full Support       No future appointments.    Additional Instructions for the Follow-ups that You Need to Schedule     Call MD With Problems / Concerns   As directed      Instructions: PCM    Order Comments: Instructions: PCM          Discharge Follow-up with PCP   As directed       Currently Documented PCP:    Giovani Mejia MD    PCP Phone Number:    [phone]     Follow Up Details: within one week of discharge         Discharge Follow-up with Specialty: Cardiology follow up as per their recommendations   As directed      Specialty: Cardiology follow up as per  their recommendations               Time spent on Discharge including face to face service:  25 minutes    Signature: Electronically signed by Franklyn Maldonado MD, 01/16/23, 15:42 EST.  [facility] Hospitalist Team

## 2023-01-16 NOTE — PLAN OF CARE
Goal Outcome Evaluation:  Plan of Care Reviewed With: patient, daughter        Progress: improving  Outcome Evaluation: Pt has been resting throughout my shift. Heparin gtt discontinued. No heart cath at this time. Vital signs stable. Will continue to monitor.

## 2023-01-16 NOTE — ED PROVIDER NOTES
"Subjective   History of Present Illness  Chief complaint: Altered mental status    82-year-old female presents with altered mental status from a facility.  According to EMS, staff at the facility was reporting that patient has been confused for the last 24 hours.  She has been \"talking out of her head and seeing people on the walls\".  Patient does have a history of dementia and is normally alert and oriented x1-2.  Patient denies any complaints.  She states she does not know why she was brought to the emergency room.  EMS reported patient was complaining of some abdominal pain.  She is not reporting any abdominal pain now but does report some mild left-sided chest pain.  No shortness of breath.  She has had no recent illness.    History provided by:  Patient and EMS personnel      Review of Systems   Constitutional: Negative for fever.   HENT: Negative for congestion.    Respiratory: Negative for cough and shortness of breath.    Cardiovascular: Positive for chest pain.   Gastrointestinal: Negative for abdominal pain.   Genitourinary: Negative for dysuria.   Skin: Negative for rash.   Neurological: Negative for headaches.   Psychiatric/Behavioral: Positive for confusion.       Past Medical History:   Diagnosis Date   • Anemia    • Atherosclerotic heart disease of native coronary artery without angina pectoris    • COPD (chronic obstructive pulmonary disease) ([facility])    • COVID-19 03/2020   • Dementia ([facility])    • Diabetes mellitus ([facility])    • GERD (gastroesophageal reflux disease)    • Gout    • Hyperlipidemia    • Hypertension    • Localized edema    • Parkinson's disease (HCC)    • Vitamin D deficiency        Allergies   Allergen Reactions   • Codeine Anaphylaxis   • Latex Anaphylaxis   • Namenda [Memantine] Anaphylaxis       Past Surgical History:   Procedure Laterality Date   • ENDOSCOPY N/A 1/2/2021    Procedure: ESOPHAGOGASTRODUODENOSCOPY;  Surgeon: Yoan Mendoza MD;  Location: [facility] ENDOSCOPY;  Service: " "Gastroenterology;  Laterality: N/A;  post: esophageal trauma from nasogastric tube, large hiatal hernia   • ENDOSCOPY N/A 5/6/2022    Procedure: ESOPHAGOGASTRODUODENOSCOPY with nasogastric tube placement;  Surgeon: Saleem Rubin MD;  Location: [facility] ENDOSCOPY;  Service: Gastroenterology;  Laterality: N/A;  post: large hiatal hernia, nasogastric tube placement       Family History   Family history unknown: Yes       Social History     Socioeconomic History   • Marital status:    Tobacco Use   • Smoking status: Never   • Smokeless tobacco: Never   Substance and Sexual Activity   • Alcohol use: Never   • Drug use: Never   • Sexual activity: Defer       /67   Pulse 63   Temp 97.2 °F (36.2 °C) (Axillary)   Resp 15   Ht 160 cm (63\")   Wt 86.7 kg (191 lb 1.6 oz)   SpO2 95%   BMI 33.85 kg/m²       Objective   Physical Exam  Vitals and nursing note reviewed.   Constitutional:       Appearance: She is well-developed.   HENT:      Head: Normocephalic and atraumatic.      Mouth/Throat:      Mouth: Mucous membranes are moist.   Eyes:      Pupils: Pupils are equal, round, and reactive to light.   Cardiovascular:      Rate and Rhythm: Normal rate and regular rhythm.      Heart sounds: Normal heart sounds.   Pulmonary:      Effort: Pulmonary effort is normal. No respiratory distress.      Breath sounds: Normal breath sounds.   Abdominal:      General: Bowel sounds are normal.      Palpations: Abdomen is soft.      Tenderness: There is no abdominal tenderness.   Skin:     General: Skin is warm and dry.   Neurological:      Mental Status: She is alert.      Comments: Oriented to person and place.  Somewhat confused in conversation.  No focal motor or sensory deficit appreciated.         Procedures           ED Course      My interpretation of EKG shows sinus rhythm, rate of 60, PAC, no ST elevation     Results for orders placed or performed during the hospital encounter of 01/15/23   Comprehensive " Metabolic Panel    Specimen: Blood   Result Value Ref Range    Glucose 110 (H) 65 - 99 mg/dL    BUN 33 (H) 8 - 23 mg/dL    Creatinine 1.85 (H) 0.57 - 1.00 mg/dL    Sodium 137 136 - 145 mmol/L    Potassium 4.1 3.5 - 5.2 mmol/L    Chloride 99 98 - 107 mmol/L    CO2 29.0 22.0 - 29.0 mmol/L    Calcium 9.2 8.6 - 10.5 mg/dL    Total Protein 7.0 6.0 - 8.5 g/dL    Albumin 3.3 (L) 3.5 - 5.2 g/dL    ALT (SGPT) <5 1 - 33 U/L    AST (SGOT) 13 1 - 32 U/L    Alkaline Phosphatase 85 39 - 117 U/L    Total Bilirubin 0.3 0.0 - 1.2 mg/dL    Globulin 3.7 gm/dL    A/G Ratio 0.9 g/dL    BUN/Creatinine Ratio 17.8 7.0 - 25.0    Anion Gap 9.0 5.0 - 15.0 mmol/L    eGFR 26.6 (L) >60.0 mL/min/1.73   Protime-INR    Specimen: Blood   Result Value Ref Range    Protime 17.2 (H) 9.6 - 11.7 Seconds    INR 1.72 (H) 0.93 - 1.10   aPTT    Specimen: Blood   Result Value Ref Range    PTT 46.4 (L) 61.0 - 76.5 seconds   Urinalysis With Culture If Indicated - Straight Cath    Specimen: Straight Cath; Urine   Result Value Ref Range    Color, UA Yellow Yellow, Straw    Appearance, UA Clear Clear    pH, UA 6.0 5.0 - 8.0    Specific Gravity, UA >=1.030 1.005 - 1.030    Glucose, UA Negative Negative    Ketones, UA Negative Negative    Bilirubin, UA Negative Negative    Blood, UA Trace (A) Negative    Protein, UA Negative Negative    Leuk Esterase, UA Negative Negative    Nitrite, UA Negative Negative    Urobilinogen, UA 0.2 E.U./dL 0.2 - 1.0 E.U./dL   Troponin    Specimen: Blood   Result Value Ref Range    Troponin T 2.510 (C) 0.000 - 0.030 ng/mL   Lipase    Specimen: Blood   Result Value Ref Range    Lipase 8 (L) 13 - 60 U/L   CBC Auto Differential    Specimen: Blood   Result Value Ref Range    WBC 8.10 3.40 - 10.80 10*3/mm3    RBC 3.68 (L) 3.77 - 5.28 10*6/mm3    Hemoglobin 11.2 (L) 12.0 - 15.9 g/dL    Hematocrit 34.1 34.0 - 46.6 %    MCV 92.4 79.0 - 97.0 fL    MCH 30.3 26.6 - 33.0 pg    MCHC 32.8 31.5 - 35.7 g/dL    RDW 15.5 (H) 12.3 - 15.4 %    RDW-SD 49.4  37.0 - 54.0 fl    MPV 8.0 6.0 - 12.0 fL    Platelets 370 140 - 450 10*3/mm3    Neutrophil % 69.1 42.7 - 76.0 %    Lymphocyte % 20.7 19.6 - 45.3 %    Monocyte % 9.0 5.0 - 12.0 %    Eosinophil % 0.6 0.3 - 6.2 %    Basophil % 0.6 0.0 - 1.5 %    Neutrophils, Absolute 5.60 1.70 - 7.00 10*3/mm3    Lymphocytes, Absolute 1.70 0.70 - 3.10 10*3/mm3    Monocytes, Absolute 0.70 0.10 - 0.90 10*3/mm3    Eosinophils, Absolute 0.00 0.00 - 0.40 10*3/mm3    Basophils, Absolute 0.00 0.00 - 0.20 10*3/mm3    nRBC 0.0 0.0 - 0.2 /100 WBC   Urinalysis, Microscopic Only - Straight Cath    Specimen: Straight Cath; Urine   Result Value Ref Range    RBC, UA 3-5 (A) None Seen /HPF    WBC, UA 0-2 (A) None Seen /HPF    Bacteria, UA None Seen None Seen /HPF    Squamous Epithelial Cells, UA 3-6 (A) None Seen, 0-2 /HPF    Hyaline Casts, UA 0-2 None Seen /LPF    Methodology Automated Microscopy    ECG 12 Lead Altered Mental Status   Result Value Ref Range    QT Interval 366 ms   Green Top (Gel)   Result Value Ref Range    Extra Tube Hold for add-ons.    Lavender Top   Result Value Ref Range    Extra Tube hold for add-on    Gold Top - SST   Result Value Ref Range    Extra Tube Hold for add-ons.    Light Blue Top   Result Value Ref Range    Extra Tube Hold for add-ons.           Chest x-ray reviewed by me shows cardiomegaly with no acute disease, pending radiology review.                 CT Head Without Contrast    Result Date: 1/15/2023  1. No acute intracranial abnormality visible by CT. 2. Small chronic lacunar infarct in the left corona radiata and moderate chronic small vessel ischemic changes in the white matter.  This examination was interpreted by Saleem Denise M.D. Electronically signed by:  Saleem Denise M.D.  1/15/2023 9:34 PM Mountain Time              Medical Decision Making  Acute kidney injury (HCC): acute illness or injury  Altered mental status, unspecified altered mental status type: acute illness or injury  NSTEMI (non-ST  elevated myocardial infarction) (HCC): acute illness or injury that poses a threat to life or bodily functions  Amount and/or Complexity of Data Reviewed  Labs: ordered. Decision-making details documented in ED Course.  Radiology: ordered and independent interpretation performed. Decision-making details documented in ED Course.  ECG/medicine tests: ordered and independent interpretation performed.      Risk  OTC drugs.  Prescription drug management.  Decision regarding hospitalization.      Critical Care  Total time providing critical care: 30-74 minutes     Patient had the above evaluation.  Results were discussed with the patient and family.  CT head shows no acute intracranial abnormality.  Chest x-ray shows no acute disease.  EKG shows no acute ischemia.  Troponin did come back elevated at 2.510.  Review of records shows patient has had borderline elevations in her troponin in the past but nothing near as significant as this.  Patient also has mild acute kidney injury with a creatinine of 1.85.  Work-up was otherwise unremarkable.  White blood cell count was normal.  Patient was given aspirin.  I called and discussed with Dr. Hill with cardiology who will consult on the patient.  He does recommend starting heparin so this was ordered.  Patient is a DNR and I discussed this with family.  They are okay with treatment at this time but are not sure how aggressive they would want to be.  I did mention the possibility of a heart catheterization and daughter who is POA states she will talk to her siblings but she does not think they would be interested in this.  I did discuss this with Dr. Hill as well.  I discussed with the nurse practitioner on-call for the hospitalist and the patient will be admitted for further evaluation and management.  Critical care time totaled 35 minutes.      Final diagnoses:   NSTEMI (non-ST elevated myocardial infarction) (HCC)   Altered mental status, unspecified altered mental  status type   Acute kidney injury (HCC)       ED Disposition  ED Disposition     ED Disposition   Decision to Admit    Condition   --    Comment   Level of Care: Progressive Care [20]   Diagnosis: AMS (altered mental status) [2312418]   Admitting Physician: NADYA MARTINEZ [[id number]]   Attending Physician: NADYA MARTINEZ [[id number]]   Bed Request Comments: PCU   Certification: I Certify That Inpatient Hospital Services Are Medically Necessary For Greater Than 2 Midnights               No follow-up provider specified.       Medication List      No changes were made to your prescriptions during this visit.          Mayur Vizcarra MD  01/16/23 0008

## 2023-01-16 NOTE — ED NOTES
"Pt from [facility] after staff reports pt has been \"not right\" x24 hours. Reports of pt \"talking out of her head\" and telling ECF staff that there are people on the walls. Hx of dementia and parkinson's, normally A&Ox1-2. Pt is A&Ox1 on ED arrival, thought she was at [facility], easily reoriented. Pt complaining of periumbilical pain to EMS. Pt tells this RN that she had some belly pain earlier and it has since subsided.   "

## 2023-01-16 NOTE — H&P
[facility] Medicine Services      Patient Name: Nory Duff  : 1940  MRN: [id number]  Primary Care Physician:  Giovani Mejia MD  Date of admission: 1/15/2023      Subjective      Chief Complaint: altered mental status     History of Present Illness: Nory Duff is a very pleasant  82 y.o. female who presented to [facility] on 1/15/2023 from [facility] facility with reports of increased confusion and altered mental status.  She is awake and alert pleasant and cooperative and is oriented to self.  Daughter at bedside assisting with history.  Daughter reports she has a past medical history of dementia and is often confused especially at night.  CT head per radiology showed no acute process.  Urinalysis negative for infection.  Labs did show an elevated troponin at 2.510.  EKG shows no ST elevation.  She reported some chest pain upon arrival to ED.  She denies any now but is aware she often forgets.  Glucose is 110 with a past medical history of insulin-dependent diabetes mellitus type 2.  Creatinine 1.85 with a past medical history of chronic kidney disease stage unknown which appears to be above her normal baseline.  Chest x-ray shows cardiomegaly with no acute process WBC is not elevated.  Hemoglobin 11.2 with a history of anemia which is improved from previous.  Cardiology was consulted from the emergency department and she was placed on a heparin drip.  She is a DNR per daughter and documentation.  Daughter reports they would not be interested in a cardiac catheterization.  Past medical history also includes Parkinson's disease, COPD not on home oxygen, hypertension hyperlipidemia.  She will be admitted for further evaluation and treatment.      Review of Systems   Constitutional: Negative.   HENT: Negative.    Eyes: Negative.    Cardiovascular: Positive for chest pain.   Respiratory: Negative.    Endocrine: Negative.     Hematologic/Lymphatic: Negative.    Skin: Negative.    Musculoskeletal: Positive for muscle weakness.   Gastrointestinal: Negative.    Genitourinary: Negative.    Neurological: Negative.    Psychiatric/Behavioral: Positive for altered mental status and memory loss.   Allergic/Immunologic: Negative.    All other systems reviewed and are negative.      Personal History     Past Medical History:   Diagnosis Date   • Anemia    • Atherosclerotic heart disease of native coronary artery without angina pectoris    • COPD (chronic obstructive pulmonary disease) ([facility])    • COVID-19 03/2020   • Dementia ([facility])    • Diabetes mellitus ([facility])    • GERD (gastroesophageal reflux disease)    • Gout    • Hyperlipidemia    • Hypertension    • Localized edema    • Parkinson's disease ([facility])    • Vitamin D deficiency        Past Surgical History:   Procedure Laterality Date   • ENDOSCOPY N/A 1/2/2021    Procedure: ESOPHAGOGASTRODUODENOSCOPY;  Surgeon: Yoan Mendoza MD;  Location: [facility] ENDOSCOPY;  Service: Gastroenterology;  Laterality: N/A;  post: esophageal trauma from nasogastric tube, large hiatal hernia   • ENDOSCOPY N/A 5/6/2022    Procedure: ESOPHAGOGASTRODUODENOSCOPY with nasogastric tube placement;  Surgeon: Saleem Rubin MD;  Location: [facility] ENDOSCOPY;  Service: Gastroenterology;  Laterality: N/A;  post: large hiatal hernia, nasogastric tube placement       Family History: Family history is unknown by patient. Otherwise pertinent FHx was reviewed and not pertinent to current issue.    Social History:  reports that she has never smoked. She has never used smokeless tobacco. She reports that she does not drink alcohol and does not use drugs.    Home Medications:  Prior to Admission Medications     Prescriptions Last Dose Informant Patient Reported? Taking?    acetaminophen (TYLENOL) 325 MG tablet  Nursing Home Yes No    Take 650 mg by mouth Every 6 (Six) Hours As Needed for Mild Pain  or Fever.    albuterol  (PROVENTIL) (2.5 MG/3ML) 0.083% nebulizer solution   No No    Take 2.5 mg by nebulization Every 4 (Four) Hours As Needed for Wheezing.    allopurinol (ZYLOPRIM) 100 MG tablet  Nursing Home Yes No    Take 200 mg by mouth Daily.    aspirin 81 MG EC tablet  Nursing Home Yes No    Take 81 mg by mouth Daily.    budesonide-formoterol (Symbicort) 160-4.5 MCG/ACT inhaler   No No    Inhale 2 puffs 2 (Two) Times a Day.    carbidopa-levodopa (SINEMET)  MG per tablet  Nursing Home Yes No    Take 1 tablet by mouth 3 (Three) Times a Day.    clopidogrel (PLAVIX) 75 MG tablet  Nursing Home Yes No    Take 75 mg by mouth Daily.    furosemide (LASIX) 20 MG tablet   No No    Take 1 tablet by mouth 2 (Two) Times a Day. q day x 7 days, check bmp in one week. Then increase to BID    insulin glargine (LANTUS, SEMGLEE) 100 UNIT/ML injection   No No    Inject 20 Units under the skin into the appropriate area as directed Daily.    magnesium hydroxide (MILK OF MAGNESIA) 400 MG/5ML suspension  Nursing Home Yes No    Take 30 mL by mouth Daily As Needed for Constipation.    metoprolol tartrate (LOPRESSOR) 50 MG tablet  Nursing Home Yes No    Take 50 mg by mouth 2 (Two) Times a Day.    nitroglycerin (NITROSTAT) 0.4 MG SL tablet   No No    Place 1 tablet under the tongue Every 5 (Five) Minutes As Needed for Chest Pain (Only if SBP Greater Than 100). Take no more than 3 doses in 15 minutes.    nystatin (MYCOSTATIN) 273122 UNIT/GM powder  Nursing Home Yes No    Apply  topically to the appropriate area as directed 3 (Three) Times a Day. Under breasts/ abd folds    Omega-3 Fatty Acids (fish oil) 1000 MG capsule capsule   Yes No    Take 1,000 mg by mouth Daily With Breakfast.    ondansetron (ZOFRAN) 4 MG tablet   No No    Take 1 tablet by mouth Every 6 (Six) Hours As Needed for Nausea or Vomiting.    pantoprazole (PROTONIX) 40 MG EC tablet   No No    Take 1 tablet by mouth Daily.            Allergies:  Allergies   Allergen Reactions   • Codeine  "Anaphylaxis   • Latex Anaphylaxis   • Namenda [Memantine] Anaphylaxis       Objective      Vitals:   Temp:  [97.2 °F (36.2 °C)] 97.2 °F (36.2 °C)  Heart Rate:  [63-67] 63  Resp:  [15-16] 15  BP: (103-112)/(67-77) 112/67    Physical Exam  Vitals reviewed.   Constitutional:       Appearance: Normal appearance. She is obese.   HENT:      Head: Normocephalic and atraumatic.      Right Ear: External ear normal.      Left Ear: External ear normal.      Nose: Nose normal.      Mouth/Throat:      Mouth: Mucous membranes are moist.   Eyes:      Extraocular Movements: Extraocular movements intact.   Cardiovascular:      Rate and Rhythm: Normal rate and regular rhythm.      Pulses: Normal pulses.      Heart sounds: Normal heart sounds.   Pulmonary:      Effort: Pulmonary effort is normal.      Breath sounds: Normal breath sounds.   Abdominal:      Palpations: Abdomen is soft.   Genitourinary:     Comments: deferred  Musculoskeletal:         General: Normal range of motion.      Cervical back: Normal range of motion and neck supple.   Skin:     General: Skin is warm and dry.      Coloration: Skin is pale.   Neurological:      General: No focal deficit present.      Mental Status: She is alert.      Comments: Oriented to self   Psychiatric:         Mood and Affect: Mood normal.         Behavior: Behavior normal.      Comments: Aware she is forgetful, pleasant and cooperative         Result Review    Result Review:  I have personally reviewed the results from the time of this admission to 1/16/2023 00:57 EST and agree with these findings:  [x]  Laboratory  []  Microbiology  [x]  Radiology  [x]  EKG/Telemetry   [x]  Cardiology/Vascular   []  Pathology  [x]  Old records  []  Other:  Most notable findings include:           CT head :  \"1. No acute intracranial abnormality visible by CT.  2. Small chronic lacunar infarct in the left corona radiata and moderate chronic small vessel ischemic changes in the white " matter.           CXR:        Assessment & Plan        Active Hospital Problems:  Active Hospital Problems    Diagnosis    • **AMS (altered mental status)    • Elevated troponin    • NSTEMI (non-ST elevated myocardial infarction) ([facility])    • Acute on chronic renal failure ([facility])    • COPD (chronic obstructive pulmonary disease) ([facility])    • GERD (gastroesophageal reflux disease)    • Hypertension    • Dementia ([facility])    • Parkinson's disease ([facility])    • Cardiomegaly    • Hyperlipidemia    • Type 2 diabetes mellitus without complication, with long-term current use of insulin ([facility])    • Anemia in other chronic diseases classified elsewhere      Plan:    Altered mental status, CT head negative for acute per radiology, urinalysis negative for infection, patient has baseline dementia memory loss and confusion, currently seems awake alert cooperative and pleasant aware she is confused and forgetful, likely at baseline or may be secondary to non-STEMI see plan below    Non-STEMI elevated troponin 2.510, reported chest pain x1, no current chest pain, cardiology consulted from ED Heparin drip initiated daughter at bedside and living will in place, daughter reports likely would not want a cardiac catheterization, continuous cardiac monitoring serial troponins, diabetic diet ordered for comfort as cardiac catheterization unlikely    Acute on chronic renal failure creatinine 1.85, above baseline 1.3 likely secondary to above, normal saline at 75 cc/h avoid nephrotoxic meds trend renal function    GERD, home meds unverified at this time reorder pending verification pharmacy    COPD not on home oxygen, denies dyspnea stable on room air Home meds unverified at this time reorder pending verification pharmacy    Hypertension, controlled on home meds reorder pending verification from pharmacy    Dementia,, pleasant and confused and aware of memory loss no home meds    Parkinson's disease, fall precautions was on home Sinemet reorder  pending verification    Cardiomegaly, on home Lasix denies shortness of air no bilateral lower extremity edema reorder pending verification    Diabetes mellitus type 2 controlled on home insulin, reorder pending verification add SSI every 6 hours Accu-Cheks every 6 hours while n.p.o.    Anemia of chronic kidney disease hemoglobin improved from previous      DVT prophylaxis:  Medical DVT prophylaxis orders are present.    CODE STATUS:    Code Status (Patient has no pulse and is not breathing): No CPR (Do Not Attempt to Resuscitate)  Medical Interventions (Patient has pulse or is breathing): Full Support    Admission Status:  I believe this patient meets inpatient status.    I discussed the patient's findings and my recommendations with patient and family.    This patient has been examined wearing appropriate Personal Protective Equipment a      Signature: Electronically signed by MAE Quiñones, 01/16/23, 12:59 AM EST.

## 2023-01-17 LAB — QT INTERVAL: 366 MS

## 2023-01-21 ENCOUNTER — APPOINTMENT (OUTPATIENT)
Dept: GENERAL RADIOLOGY | Facility: HOSPITAL | Age: 83
End: 2023-01-21
Payer: MEDICARE

## 2023-01-21 ENCOUNTER — HOSPITAL ENCOUNTER (OUTPATIENT)
Facility: HOSPITAL | Age: 83
Setting detail: OBSERVATION
Discharge: NURSING, REHAB OR LONG-TERM CARE | End: 2023-01-24
Attending: EMERGENCY MEDICINE | Admitting: INTERNAL MEDICINE
Payer: MEDICARE

## 2023-01-21 ENCOUNTER — APPOINTMENT (OUTPATIENT)
Dept: CT IMAGING | Facility: HOSPITAL | Age: 83
End: 2023-01-21
Payer: MEDICARE

## 2023-01-21 DIAGNOSIS — N18.32 STAGE 3B CHRONIC KIDNEY DISEASE: ICD-10-CM

## 2023-01-21 DIAGNOSIS — G20 PARKINSON'S DISEASE: ICD-10-CM

## 2023-01-21 DIAGNOSIS — I50.9 CONGESTIVE HEART FAILURE, UNSPECIFIED HF CHRONICITY, UNSPECIFIED HEART FAILURE TYPE: Primary | ICD-10-CM

## 2023-01-21 DIAGNOSIS — R77.8 ELEVATED TROPONIN: ICD-10-CM

## 2023-01-21 DIAGNOSIS — F03.90 DEMENTIA, UNSPECIFIED DEMENTIA SEVERITY, UNSPECIFIED DEMENTIA TYPE, UNSPECIFIED WHETHER BEHAVIORAL, PSYCHOTIC, OR MOOD DISTURBANCE OR ANXIETY: ICD-10-CM

## 2023-01-21 PROBLEM — I25.2 HISTORY OF NON-ST ELEVATION MYOCARDIAL INFARCTION (NSTEMI): Status: ACTIVE | Noted: 2023-01-16

## 2023-01-21 PROBLEM — I48.91 ATRIAL FIBRILLATION (HCC): Status: ACTIVE | Noted: 2023-01-21

## 2023-01-21 LAB
ALBUMIN SERPL-MCNC: 2.6 G/DL (ref 3.5–5.2)
ALBUMIN/GLOB SERPL: 0.8 G/DL
ALP SERPL-CCNC: 99 U/L (ref 39–117)
ALT SERPL W P-5'-P-CCNC: 5 U/L (ref 1–33)
AMORPH URATE CRY URNS QL MICRO: ABNORMAL /HPF
ANION GAP SERPL CALCULATED.3IONS-SCNC: 11 MMOL/L (ref 5–15)
AST SERPL-CCNC: 7 U/L (ref 1–32)
B PARAPERT DNA SPEC QL NAA+PROBE: NOT DETECTED
B PERT DNA SPEC QL NAA+PROBE: NOT DETECTED
BACTERIA UR QL AUTO: ABNORMAL /HPF
BASOPHILS # BLD AUTO: 0 10*3/MM3 (ref 0–0.2)
BASOPHILS NFR BLD AUTO: 0.4 % (ref 0–1.5)
BILIRUB SERPL-MCNC: 0.3 MG/DL (ref 0–1.2)
BILIRUB UR QL STRIP: NEGATIVE
BUN SERPL-MCNC: 20 MG/DL (ref 8–23)
BUN/CREAT SERPL: 14.9 (ref 7–25)
C PNEUM DNA NPH QL NAA+NON-PROBE: NOT DETECTED
CALCIUM SPEC-SCNC: 8.9 MG/DL (ref 8.6–10.5)
CHLORIDE SERPL-SCNC: 105 MMOL/L (ref 98–107)
CLARITY UR: CLEAR
CO2 SERPL-SCNC: 25 MMOL/L (ref 22–29)
COD CRY URNS QL: ABNORMAL /HPF
COLOR UR: ABNORMAL
CREAT SERPL-MCNC: 1.34 MG/DL (ref 0.57–1)
DEPRECATED RDW RBC AUTO: 52.5 FL (ref 37–54)
EGFRCR SERPLBLD CKD-EPI 2021: 39.2 ML/MIN/1.73
EOSINOPHIL # BLD AUTO: 0.1 10*3/MM3 (ref 0–0.4)
EOSINOPHIL NFR BLD AUTO: 1.3 % (ref 0.3–6.2)
ERYTHROCYTE [DISTWIDTH] IN BLOOD BY AUTOMATED COUNT: 16.2 % (ref 12.3–15.4)
FLUAV SUBTYP SPEC NAA+PROBE: NOT DETECTED
FLUBV RNA ISLT QL NAA+PROBE: NOT DETECTED
GLOBULIN UR ELPH-MCNC: 3.3 GM/DL
GLUCOSE SERPL-MCNC: 178 MG/DL (ref 65–99)
GLUCOSE UR STRIP-MCNC: NEGATIVE MG/DL
HADV DNA SPEC NAA+PROBE: NOT DETECTED
HCOV 229E RNA SPEC QL NAA+PROBE: NOT DETECTED
HCOV HKU1 RNA SPEC QL NAA+PROBE: NOT DETECTED
HCOV NL63 RNA SPEC QL NAA+PROBE: NOT DETECTED
HCOV OC43 RNA SPEC QL NAA+PROBE: NOT DETECTED
HCT VFR BLD AUTO: 35.2 % (ref 34–46.6)
HGB BLD-MCNC: 11.3 G/DL (ref 12–15.9)
HGB UR QL STRIP.AUTO: NEGATIVE
HMPV RNA NPH QL NAA+NON-PROBE: NOT DETECTED
HPIV1 RNA ISLT QL NAA+PROBE: NOT DETECTED
HPIV2 RNA SPEC QL NAA+PROBE: NOT DETECTED
HPIV3 RNA NPH QL NAA+PROBE: NOT DETECTED
HPIV4 P GENE NPH QL NAA+PROBE: NOT DETECTED
HYALINE CASTS UR QL AUTO: ABNORMAL /LPF
KETONES UR QL STRIP: NEGATIVE
LEUKOCYTE ESTERASE UR QL STRIP.AUTO: NEGATIVE
LYMPHOCYTES # BLD AUTO: 1.4 10*3/MM3 (ref 0.7–3.1)
LYMPHOCYTES NFR BLD AUTO: 15.2 % (ref 19.6–45.3)
M PNEUMO IGG SER IA-ACNC: NOT DETECTED
MAGNESIUM SERPL-MCNC: 1.9 MG/DL (ref 1.6–2.4)
MCH RBC QN AUTO: 29.7 PG (ref 26.6–33)
MCHC RBC AUTO-ENTMCNC: 32 G/DL (ref 31.5–35.7)
MCV RBC AUTO: 93 FL (ref 79–97)
MONOCYTES # BLD AUTO: 1 10*3/MM3 (ref 0.1–0.9)
MONOCYTES NFR BLD AUTO: 11.2 % (ref 5–12)
NEUTROPHILS NFR BLD AUTO: 6.5 10*3/MM3 (ref 1.7–7)
NEUTROPHILS NFR BLD AUTO: 71.9 % (ref 42.7–76)
NITRITE UR QL STRIP: NEGATIVE
NRBC BLD AUTO-RTO: 0 /100 WBC (ref 0–0.2)
NT-PROBNP SERPL-MCNC: ABNORMAL PG/ML (ref 0–1800)
PH UR STRIP.AUTO: >=9 [PH] (ref 5–8)
PLATELET # BLD AUTO: 218 10*3/MM3 (ref 140–450)
PMV BLD AUTO: 8.9 FL (ref 6–12)
POTASSIUM SERPL-SCNC: 4 MMOL/L (ref 3.5–5.2)
PROT SERPL-MCNC: 5.9 G/DL (ref 6–8.5)
PROT UR QL STRIP: ABNORMAL
RBC # BLD AUTO: 3.79 10*6/MM3 (ref 3.77–5.28)
RBC # UR STRIP: ABNORMAL /HPF
REF LAB TEST METHOD: ABNORMAL
RHINOVIRUS RNA SPEC NAA+PROBE: DETECTED
RSV RNA NPH QL NAA+NON-PROBE: NOT DETECTED
SARS-COV-2 RNA NPH QL NAA+NON-PROBE: NOT DETECTED
SODIUM SERPL-SCNC: 141 MMOL/L (ref 136–145)
SP GR UR STRIP: 1.01 (ref 1–1.03)
SQUAMOUS #/AREA URNS HPF: ABNORMAL /HPF
TRI-PHOS CRY URNS QL MICRO: ABNORMAL /HPF
TROPONIN T SERPL-MCNC: 0.36 NG/ML (ref 0–0.03)
UROBILINOGEN UR QL STRIP: ABNORMAL
WBC # UR STRIP: ABNORMAL /HPF
WBC NRBC COR # BLD: 9.1 10*3/MM3 (ref 3.4–10.8)

## 2023-01-21 PROCEDURE — 99285 EMERGENCY DEPT VISIT HI MDM: CPT

## 2023-01-21 PROCEDURE — 85025 COMPLETE CBC W/AUTO DIFF WBC: CPT | Performed by: NURSE PRACTITIONER

## 2023-01-21 PROCEDURE — 93005 ELECTROCARDIOGRAM TRACING: CPT | Performed by: NURSE PRACTITIONER

## 2023-01-21 PROCEDURE — 25010000002 FUROSEMIDE PER 20 MG: Performed by: NURSE PRACTITIONER

## 2023-01-21 PROCEDURE — 96374 THER/PROPH/DIAG INJ IV PUSH: CPT

## 2023-01-21 PROCEDURE — G0378 HOSPITAL OBSERVATION PER HR: HCPCS

## 2023-01-21 PROCEDURE — 70450 CT HEAD/BRAIN W/O DYE: CPT

## 2023-01-21 PROCEDURE — 0202U NFCT DS 22 TRGT SARS-COV-2: CPT | Performed by: NURSE PRACTITIONER

## 2023-01-21 PROCEDURE — 71045 X-RAY EXAM CHEST 1 VIEW: CPT

## 2023-01-21 PROCEDURE — 81001 URINALYSIS AUTO W/SCOPE: CPT | Performed by: NURSE PRACTITIONER

## 2023-01-21 PROCEDURE — 83880 ASSAY OF NATRIURETIC PEPTIDE: CPT | Performed by: NURSE PRACTITIONER

## 2023-01-21 PROCEDURE — 84484 ASSAY OF TROPONIN QUANT: CPT | Performed by: NURSE PRACTITIONER

## 2023-01-21 PROCEDURE — P9612 CATHETERIZE FOR URINE SPEC: HCPCS

## 2023-01-21 PROCEDURE — 80053 COMPREHEN METABOLIC PANEL: CPT | Performed by: NURSE PRACTITIONER

## 2023-01-21 PROCEDURE — 83735 ASSAY OF MAGNESIUM: CPT | Performed by: NURSE PRACTITIONER

## 2023-01-21 PROCEDURE — 36415 COLL VENOUS BLD VENIPUNCTURE: CPT

## 2023-01-21 RX ORDER — POTASSIUM CHLORIDE 1.5 G/1.77G
40 POWDER, FOR SOLUTION ORAL AS NEEDED
Status: DISCONTINUED | OUTPATIENT
Start: 2023-01-21 | End: 2023-01-24 | Stop reason: HOSPADM

## 2023-01-21 RX ORDER — ACETAMINOPHEN 160 MG/5ML
650 SOLUTION ORAL EVERY 4 HOURS PRN
Status: DISCONTINUED | OUTPATIENT
Start: 2023-01-21 | End: 2023-01-24 | Stop reason: HOSPADM

## 2023-01-21 RX ORDER — ALBUTEROL SULFATE 90 UG/1
2 AEROSOL, METERED RESPIRATORY (INHALATION) EVERY 4 HOURS PRN
Status: DISCONTINUED | OUTPATIENT
Start: 2023-01-21 | End: 2023-01-24 | Stop reason: HOSPADM

## 2023-01-21 RX ORDER — INSULIN LISPRO 100 [IU]/ML
3-14 INJECTION, SOLUTION INTRAVENOUS; SUBCUTANEOUS
Status: DISCONTINUED | OUTPATIENT
Start: 2023-01-22 | End: 2023-01-24 | Stop reason: HOSPADM

## 2023-01-21 RX ORDER — ACETAMINOPHEN 325 MG/1
650 TABLET ORAL EVERY 4 HOURS PRN
Status: DISCONTINUED | OUTPATIENT
Start: 2023-01-21 | End: 2023-01-24 | Stop reason: HOSPADM

## 2023-01-21 RX ORDER — ACETAMINOPHEN 650 MG/1
650 SUPPOSITORY RECTAL EVERY 4 HOURS PRN
Status: DISCONTINUED | OUTPATIENT
Start: 2023-01-21 | End: 2023-01-24 | Stop reason: HOSPADM

## 2023-01-21 RX ORDER — AMOXICILLIN 250 MG
1 CAPSULE ORAL NIGHTLY PRN
Status: DISCONTINUED | OUTPATIENT
Start: 2023-01-21 | End: 2023-01-24 | Stop reason: HOSPADM

## 2023-01-21 RX ORDER — SODIUM CHLORIDE 9 MG/ML
40 INJECTION, SOLUTION INTRAVENOUS AS NEEDED
Status: DISCONTINUED | OUTPATIENT
Start: 2023-01-21 | End: 2023-01-24 | Stop reason: HOSPADM

## 2023-01-21 RX ORDER — SODIUM CHLORIDE 0.9 % (FLUSH) 0.9 %
10 SYRINGE (ML) INJECTION AS NEEDED
Status: DISCONTINUED | OUTPATIENT
Start: 2023-01-21 | End: 2023-01-24 | Stop reason: HOSPADM

## 2023-01-21 RX ORDER — MAGNESIUM SULFATE HEPTAHYDRATE 40 MG/ML
4 INJECTION, SOLUTION INTRAVENOUS AS NEEDED
Status: DISCONTINUED | OUTPATIENT
Start: 2023-01-21 | End: 2023-01-24 | Stop reason: HOSPADM

## 2023-01-21 RX ORDER — ONDANSETRON 2 MG/ML
4 INJECTION INTRAMUSCULAR; INTRAVENOUS EVERY 6 HOURS PRN
Status: DISCONTINUED | OUTPATIENT
Start: 2023-01-21 | End: 2023-01-24 | Stop reason: HOSPADM

## 2023-01-21 RX ORDER — ALUMINA, MAGNESIA, AND SIMETHICONE 2400; 2400; 240 MG/30ML; MG/30ML; MG/30ML
15 SUSPENSION ORAL EVERY 6 HOURS PRN
Status: DISCONTINUED | OUTPATIENT
Start: 2023-01-21 | End: 2023-01-24 | Stop reason: HOSPADM

## 2023-01-21 RX ORDER — CALCIUM CARBONATE 200(500)MG
2 TABLET,CHEWABLE ORAL 2 TIMES DAILY PRN
Status: DISCONTINUED | OUTPATIENT
Start: 2023-01-21 | End: 2023-01-24 | Stop reason: HOSPADM

## 2023-01-21 RX ORDER — MAGNESIUM SULFATE HEPTAHYDRATE 40 MG/ML
2 INJECTION, SOLUTION INTRAVENOUS AS NEEDED
Status: DISCONTINUED | OUTPATIENT
Start: 2023-01-21 | End: 2023-01-24 | Stop reason: HOSPADM

## 2023-01-21 RX ORDER — SODIUM CHLORIDE 0.9 % (FLUSH) 0.9 %
10 SYRINGE (ML) INJECTION EVERY 12 HOURS SCHEDULED
Status: DISCONTINUED | OUTPATIENT
Start: 2023-01-21 | End: 2023-01-24 | Stop reason: HOSPADM

## 2023-01-21 RX ORDER — FUROSEMIDE 10 MG/ML
40 INJECTION INTRAMUSCULAR; INTRAVENOUS ONCE
Status: COMPLETED | OUTPATIENT
Start: 2023-01-21 | End: 2023-01-21

## 2023-01-21 RX ORDER — NICOTINE POLACRILEX 4 MG
15 LOZENGE BUCCAL
Status: DISCONTINUED | OUTPATIENT
Start: 2023-01-21 | End: 2023-01-24 | Stop reason: HOSPADM

## 2023-01-21 RX ORDER — FUROSEMIDE 10 MG/ML
30 INJECTION INTRAMUSCULAR; INTRAVENOUS EVERY 12 HOURS
Status: DISCONTINUED | OUTPATIENT
Start: 2023-01-21 | End: 2023-01-23

## 2023-01-21 RX ORDER — HEPARIN SODIUM 5000 [USP'U]/ML
5000 INJECTION, SOLUTION INTRAVENOUS; SUBCUTANEOUS EVERY 12 HOURS SCHEDULED
Status: DISCONTINUED | OUTPATIENT
Start: 2023-01-21 | End: 2023-01-24 | Stop reason: HOSPADM

## 2023-01-21 RX ORDER — ONDANSETRON 4 MG/1
4 TABLET, FILM COATED ORAL EVERY 6 HOURS PRN
Status: DISCONTINUED | OUTPATIENT
Start: 2023-01-21 | End: 2023-01-24 | Stop reason: HOSPADM

## 2023-01-21 RX ORDER — OLANZAPINE 10 MG/2ML
1 INJECTION, POWDER, LYOPHILIZED, FOR SOLUTION INTRAMUSCULAR
Status: DISCONTINUED | OUTPATIENT
Start: 2023-01-21 | End: 2023-01-24 | Stop reason: HOSPADM

## 2023-01-21 RX ORDER — BENZONATATE 100 MG/1
100 CAPSULE ORAL 3 TIMES DAILY PRN
Status: DISCONTINUED | OUTPATIENT
Start: 2023-01-21 | End: 2023-01-24 | Stop reason: HOSPADM

## 2023-01-21 RX ORDER — NITROGLYCERIN 0.4 MG/1
0.4 TABLET SUBLINGUAL
Status: DISCONTINUED | OUTPATIENT
Start: 2023-01-21 | End: 2023-01-24 | Stop reason: HOSPADM

## 2023-01-21 RX ORDER — POTASSIUM CHLORIDE 20 MEQ/1
40 TABLET, EXTENDED RELEASE ORAL AS NEEDED
Status: DISCONTINUED | OUTPATIENT
Start: 2023-01-21 | End: 2023-01-24 | Stop reason: HOSPADM

## 2023-01-21 RX ORDER — DEXTROSE MONOHYDRATE 25 G/50ML
25 INJECTION, SOLUTION INTRAVENOUS
Status: DISCONTINUED | OUTPATIENT
Start: 2023-01-21 | End: 2023-01-24 | Stop reason: HOSPADM

## 2023-01-21 RX ADMIN — FUROSEMIDE 40 MG: 10 INJECTION, SOLUTION INTRAMUSCULAR; INTRAVENOUS at 22:58

## 2023-01-21 NOTE — Clinical Note
Level of Care: Telemetry [5]   Admitting Physician: CATHY JACKSON [[id number]]   Attending Physician: CATHY JACKSON [[id number]]

## 2023-01-22 ENCOUNTER — APPOINTMENT (OUTPATIENT)
Dept: CARDIOLOGY | Facility: HOSPITAL | Age: 83
End: 2023-01-22
Payer: MEDICARE

## 2023-01-22 PROBLEM — B34.8 RHINOVIRUS: Status: ACTIVE | Noted: 2023-01-22

## 2023-01-22 LAB
ALBUMIN SERPL-MCNC: 3.2 G/DL (ref 3.5–5.2)
ALBUMIN/GLOB SERPL: 0.9 G/DL
ALP SERPL-CCNC: 109 U/L (ref 39–117)
ALT SERPL W P-5'-P-CCNC: 7 U/L (ref 1–33)
ANION GAP SERPL CALCULATED.3IONS-SCNC: 9 MMOL/L (ref 5–15)
APTT PPP: 36.9 SECONDS (ref 24–31)
AST SERPL-CCNC: 7 U/L (ref 1–32)
BASOPHILS # BLD AUTO: 0 10*3/MM3 (ref 0–0.2)
BASOPHILS NFR BLD AUTO: 0.5 % (ref 0–1.5)
BILIRUB SERPL-MCNC: 0.4 MG/DL (ref 0–1.2)
BUN SERPL-MCNC: 23 MG/DL (ref 8–23)
BUN/CREAT SERPL: 16.9 (ref 7–25)
CALCIUM SPEC-SCNC: 9 MG/DL (ref 8.6–10.5)
CHLORIDE SERPL-SCNC: 102 MMOL/L (ref 98–107)
CO2 SERPL-SCNC: 32 MMOL/L (ref 22–29)
CREAT SERPL-MCNC: 1.36 MG/DL (ref 0.57–1)
DEPRECATED RDW RBC AUTO: 51.6 FL (ref 37–54)
EGFRCR SERPLBLD CKD-EPI 2021: 38.5 ML/MIN/1.73
EOSINOPHIL # BLD AUTO: 0.2 10*3/MM3 (ref 0–0.4)
EOSINOPHIL NFR BLD AUTO: 2.2 % (ref 0.3–6.2)
ERYTHROCYTE [DISTWIDTH] IN BLOOD BY AUTOMATED COUNT: 16.1 % (ref 12.3–15.4)
GLOBULIN UR ELPH-MCNC: 3.5 GM/DL
GLUCOSE BLDC GLUCOMTR-MCNC: 120 MG/DL (ref 70–105)
GLUCOSE BLDC GLUCOMTR-MCNC: 151 MG/DL (ref 70–105)
GLUCOSE BLDC GLUCOMTR-MCNC: 158 MG/DL (ref 70–105)
GLUCOSE SERPL-MCNC: 158 MG/DL (ref 65–99)
HCT VFR BLD AUTO: 36.3 % (ref 34–46.6)
HGB BLD-MCNC: 11.7 G/DL (ref 12–15.9)
INR PPP: 1.25 (ref 0.93–1.1)
LYMPHOCYTES # BLD AUTO: 1.7 10*3/MM3 (ref 0.7–3.1)
LYMPHOCYTES NFR BLD AUTO: 22.8 % (ref 19.6–45.3)
MAGNESIUM SERPL-MCNC: 2 MG/DL (ref 1.6–2.4)
MCH RBC QN AUTO: 30.1 PG (ref 26.6–33)
MCHC RBC AUTO-ENTMCNC: 32.4 G/DL (ref 31.5–35.7)
MCV RBC AUTO: 93.1 FL (ref 79–97)
MONOCYTES # BLD AUTO: 0.8 10*3/MM3 (ref 0.1–0.9)
MONOCYTES NFR BLD AUTO: 11.1 % (ref 5–12)
NEUTROPHILS NFR BLD AUTO: 4.7 10*3/MM3 (ref 1.7–7)
NEUTROPHILS NFR BLD AUTO: 63.4 % (ref 42.7–76)
NRBC BLD AUTO-RTO: 0 /100 WBC (ref 0–0.2)
PLATELET # BLD AUTO: 265 10*3/MM3 (ref 140–450)
PMV BLD AUTO: 8.6 FL (ref 6–12)
POTASSIUM SERPL-SCNC: 4.3 MMOL/L (ref 3.5–5.2)
PROT SERPL-MCNC: 6.7 G/DL (ref 6–8.5)
PROTHROMBIN TIME: 12.7 SECONDS (ref 9.6–11.7)
QT INTERVAL: 458 MS
RBC # BLD AUTO: 3.9 10*6/MM3 (ref 3.77–5.28)
SODIUM SERPL-SCNC: 143 MMOL/L (ref 136–145)
TROPONIN T SERPL-MCNC: 0.39 NG/ML (ref 0–0.03)
WBC NRBC COR # BLD: 7.4 10*3/MM3 (ref 3.4–10.8)

## 2023-01-22 PROCEDURE — 96376 TX/PRO/DX INJ SAME DRUG ADON: CPT

## 2023-01-22 PROCEDURE — G0378 HOSPITAL OBSERVATION PER HR: HCPCS

## 2023-01-22 PROCEDURE — 85610 PROTHROMBIN TIME: CPT | Performed by: STUDENT IN AN ORGANIZED HEALTH CARE EDUCATION/TRAINING PROGRAM

## 2023-01-22 PROCEDURE — 87102 FUNGUS ISOLATION CULTURE: CPT | Performed by: STUDENT IN AN ORGANIZED HEALTH CARE EDUCATION/TRAINING PROGRAM

## 2023-01-22 PROCEDURE — 25010000002 HEPARIN (PORCINE) PER 1000 UNITS: Performed by: NURSE PRACTITIONER

## 2023-01-22 PROCEDURE — 82962 GLUCOSE BLOOD TEST: CPT

## 2023-01-22 PROCEDURE — 99214 OFFICE O/P EST MOD 30 MIN: CPT | Performed by: INTERNAL MEDICINE

## 2023-01-22 PROCEDURE — 80053 COMPREHEN METABOLIC PANEL: CPT | Performed by: STUDENT IN AN ORGANIZED HEALTH CARE EDUCATION/TRAINING PROGRAM

## 2023-01-22 PROCEDURE — 25010000002 SULFUR HEXAFLUORIDE MICROSPH 60.7-25 MG RECONSTITUTED SUSPENSION: Performed by: STUDENT IN AN ORGANIZED HEALTH CARE EDUCATION/TRAINING PROGRAM

## 2023-01-22 PROCEDURE — 63710000001 INSULIN LISPRO (HUMAN) PER 5 UNITS: Performed by: NURSE PRACTITIONER

## 2023-01-22 PROCEDURE — 85025 COMPLETE CBC W/AUTO DIFF WBC: CPT | Performed by: STUDENT IN AN ORGANIZED HEALTH CARE EDUCATION/TRAINING PROGRAM

## 2023-01-22 PROCEDURE — 96372 THER/PROPH/DIAG INJ SC/IM: CPT

## 2023-01-22 PROCEDURE — 93306 TTE W/DOPPLER COMPLETE: CPT | Performed by: INTERNAL MEDICINE

## 2023-01-22 PROCEDURE — 84484 ASSAY OF TROPONIN QUANT: CPT | Performed by: STUDENT IN AN ORGANIZED HEALTH CARE EDUCATION/TRAINING PROGRAM

## 2023-01-22 PROCEDURE — 93306 TTE W/DOPPLER COMPLETE: CPT

## 2023-01-22 PROCEDURE — 25010000002 FUROSEMIDE PER 20 MG: Performed by: NURSE PRACTITIONER

## 2023-01-22 PROCEDURE — 83735 ASSAY OF MAGNESIUM: CPT | Performed by: STUDENT IN AN ORGANIZED HEALTH CARE EDUCATION/TRAINING PROGRAM

## 2023-01-22 PROCEDURE — 85730 THROMBOPLASTIN TIME PARTIAL: CPT | Performed by: STUDENT IN AN ORGANIZED HEALTH CARE EDUCATION/TRAINING PROGRAM

## 2023-01-22 RX ADMIN — Medication 10 ML: at 09:39

## 2023-01-22 RX ADMIN — INSULIN LISPRO 3 UNITS: 100 INJECTION, SOLUTION INTRAVENOUS; SUBCUTANEOUS at 18:09

## 2023-01-22 RX ADMIN — HEPARIN SODIUM 5000 UNITS: 5000 INJECTION INTRAVENOUS; SUBCUTANEOUS at 09:39

## 2023-01-22 RX ADMIN — SULFUR HEXAFLUORIDE 3 ML: KIT at 12:04

## 2023-01-22 RX ADMIN — HEPARIN SODIUM 5000 UNITS: 5000 INJECTION INTRAVENOUS; SUBCUTANEOUS at 01:32

## 2023-01-22 RX ADMIN — FUROSEMIDE 30 MG: 10 INJECTION, SOLUTION INTRAMUSCULAR; INTRAVENOUS at 04:59

## 2023-01-22 RX ADMIN — FUROSEMIDE 30 MG: 10 INJECTION, SOLUTION INTRAMUSCULAR; INTRAVENOUS at 18:09

## 2023-01-22 RX ADMIN — Medication 10 ML: at 01:34

## 2023-01-22 NOTE — CASE MANAGEMENT/SOCIAL WORK
Continued Stay Note   Mal[place]     Patient Name: Nory Duff  MRN: [id number]  Today's Date: 1/22/2023    Admit Date: 1/21/2023    Plan: from [facility] per nursing and notes.   Discharge Plan     Row Name 01/22/23 1832       Plan    Plan from [facility] per nursing and notes.    Plan Comments from [facility]- added to inbasket.                    Brittanie Lazo RN

## 2023-01-22 NOTE — PLAN OF CARE
Goal Outcome Evaluation:  Plan of Care Reviewed With: patient        Progress: no change  Outcome Evaluation: Pt resting in bed.  No s/s of SOA, pain or discomfort.  Will continue with current orders care plans and monitoring

## 2023-01-22 NOTE — ED PROVIDER NOTES
"Subjective   History of Present Illness  Patient presents with:  Fatigue    Giovani Mejia MD   No LMP recorded. Patient has had a hysterectomy.  Patient is an 82-year-old female presents the ED for an [facility].  For possible altered mental status per EMS.  EMS reports when they arrived the patient was awake alert and oriented x3.  The patient does report that she \"just does not feel well\".  She reports that she is unsure why specifically, she reports some mild abdominal discomfort and feels nauseated.  Denies chest pain.  No shortness of breath.        Review of Systems   Constitutional: Negative for chills and fever.   HENT: Negative for congestion and rhinorrhea.    Eyes: Negative for photophobia and visual disturbance.   Respiratory: Negative for shortness of breath.    Cardiovascular: Negative for chest pain.   Gastrointestinal: Positive for abdominal pain and nausea. Negative for diarrhea and vomiting.   Genitourinary: Negative for dysuria.   Musculoskeletal: Negative for back pain, neck pain and neck stiffness.   Skin: Negative for color change and rash.   Neurological: Positive for weakness (Generalized). Negative for dizziness, syncope and light-headedness.   Psychiatric/Behavioral: Negative for confusion.       Past Medical History:   Diagnosis Date   • Anemia    • Atherosclerotic heart disease of native coronary artery without angina pectoris    • COPD (chronic obstructive pulmonary disease) ([facility])    • COVID-19 03/2020   • Dementia ([facility])    • Diabetes mellitus ([facility])    • GERD (gastroesophageal reflux disease)    • Gout    • Hyperlipidemia    • Hypertension    • Localized edema    • Parkinson's disease ([facility])    • Vitamin D deficiency        Allergies   Allergen Reactions   • Codeine Anaphylaxis   • Latex Anaphylaxis   • Namenda [Memantine] Anaphylaxis       Past Surgical History:   Procedure Laterality Date   • ENDOSCOPY N/A 1/2/2021    Procedure: ESOPHAGOGASTRODUODENOSCOPY;  " Surgeon: Yoan Mendoza MD;  Location: [facility] ENDOSCOPY;  Service: Gastroenterology;  Laterality: N/A;  post: esophageal trauma from nasogastric tube, large hiatal hernia   • ENDOSCOPY N/A 5/6/2022    Procedure: ESOPHAGOGASTRODUODENOSCOPY with nasogastric tube placement;  Surgeon: Saleem Rubin MD;  Location: [facility] ENDOSCOPY;  Service: Gastroenterology;  Laterality: N/A;  post: large hiatal hernia, nasogastric tube placement       Family History   Family history unknown: Yes       Social History     Socioeconomic History   • Marital status:    Tobacco Use   • Smoking status: Never   • Smokeless tobacco: Never   Substance and Sexual Activity   • Alcohol use: Never   • Drug use: Never   • Sexual activity: Defer           Objective   Physical Exam  Vitals and nursing note reviewed.   Constitutional:       Appearance: Normal appearance.   HENT:      Head: Normocephalic and atraumatic.      Nose: Nose normal.      Mouth/Throat:      Mouth: Mucous membranes are moist.      Pharynx: Oropharynx is clear.   Eyes:      Extraocular Movements: Extraocular movements intact.      Conjunctiva/sclera: Conjunctivae normal.      Pupils: Pupils are equal, round, and reactive to light.   Cardiovascular:      Rate and Rhythm: Normal rate and regular rhythm.      Heart sounds: Normal heart sounds. No murmur heard.    No friction rub. No gallop.   Pulmonary:      Effort: Pulmonary effort is normal.      Breath sounds: Normal breath sounds.   Abdominal:      General: Bowel sounds are normal.      Palpations: Abdomen is soft.   Musculoskeletal:      Cervical back: Normal range of motion and neck supple.   Skin:     General: Skin is warm and dry.      Capillary Refill: Capillary refill takes less than 2 seconds.   Neurological:      Mental Status: She is alert and oriented to person, place, and time.   Psychiatric:         Mood and Affect: Mood normal.         Behavior: Behavior normal.         Procedures       EKG  "interpreted per ED physician, Dr. Oliveira, viewed by me.  Our findings are: Sinus rhythm rate of 69, compared to previous 1/15/2023.    ED Course  ED Course as of 01/21/23 2312   Sat Jan 21, 2023   2223 Patient's previous troponin 6 days ago were 2.5, 2.07, 1.87. [LB]      ED Course User Index  [LB] Pita Alvarez APRN      /50   Pulse 67   Temp 98.2 °F (36.8 °C)   Resp 20   Ht 160 cm (63\")   Wt 86.6 kg (191 lb)   SpO2 100%   BMI 33.83 kg/m²   Labs Reviewed   RESPIRATORY PANEL PCR W/ COVID-19 (SARS-COV-2) [facility]A/[facility] IN-HOUSE, NP SWAB IN UTM/VTP, 3-4 HR TAT - Abnormal; Notable for the following components:       Result Value    Human Rhinovirus/Enterovirus Detected (*)     All other components within normal limits    Narrative:     In the setting of a positive respiratory panel with a viral infection PLUS a negative procalcitonin without other underlying concern for bacterial infection, consider observing off antibiotics or discontinuation of antibiotics and continue supportive care. If the respiratory panel is positive for atypical bacterial infection (Bordetella pertussis, Chlamydophila pneumoniae, or Mycoplasma pneumoniae), consider antibiotic de-escalation to target atypical bacterial infection.   COMPREHENSIVE METABOLIC PANEL - Abnormal; Notable for the following components:    Glucose 178 (*)     Creatinine 1.34 (*)     Total Protein 5.9 (*)     Albumin 2.6 (*)     eGFR 39.2 (*)     All other components within normal limits    Narrative:     GFR Normal >60  Chronic Kidney Disease <60  Kidney Failure <15    The GFR formula is only valid for adults with stable renal function between ages 18 and 70.   BNP (IN-HOUSE) - Abnormal; Notable for the following components:    proBNP 16,252.0 (*)     All other components within normal limits    Narrative:     Among patients with dyspnea, NT-proBNP is highly sensitive for the detection of acute congestive heart failure. In addition " NT-proBNP of <300 pg/ml effectively rules out acute congestive heart failure with 99% negative predictive value.    Results may be falsely decreased if patient taking Biotin.     TROPONIN (IN-HOUSE) - Abnormal; Notable for the following components:    Troponin T 0.359 (*)     All other components within normal limits    Narrative:     Troponin T Reference Range:  <= 0.03 ng/mL-   Negative for AMI  >0.03 ng/mL-     Abnormal for myocardial necrosis.  Clinicians would have to utilize clinical acumen, EKG, Troponin and serial changes to determine if it is an Acute Myocardial Infarction or myocardial injury due to an underlying chronic condition.       Results may be falsely decreased if patient taking Biotin.     URINALYSIS W/ CULTURE IF INDICATED - Abnormal; Notable for the following components:    Color, UA Orange (*)     pH, UA >=9.0 (*)     Protein, UA 30 mg/dL (1+) (*)     All other components within normal limits    Narrative:     In absence of clinical symptoms, the presence of pyuria, bacteria, and/or nitrites on the urinalysis result does not correlate with infection.   CBC WITH AUTO DIFFERENTIAL - Abnormal; Notable for the following components:    Hemoglobin 11.3 (*)     RDW 16.2 (*)     Lymphocyte % 15.2 (*)     Monocytes, Absolute 1.00 (*)     All other components within normal limits   URINALYSIS, MICROSCOPIC ONLY - Abnormal; Notable for the following components:    RBC, UA 0-2 (*)     WBC, UA 0-2 (*)     Squamous Epithelial Cells, UA 13-20 (*)     All other components within normal limits   CBC AND DIFFERENTIAL    Narrative:     The following orders were created for panel order CBC & Differential.  Procedure                               Abnormality         Status                     ---------                               -----------         ------                     CBC Auto Differential[[id number]]        Abnormal            Final result                 Please view results for these tests on the  individual orders.     Medications   sodium chloride 0.9 % flush 10 mL (has no administration in time range)   furosemide (LASIX) injection 40 mg (40 mg Intravenous Given 1/21/23 9615)     CT Head Without Contrast    Result Date: 1/21/2023  1. No acute intracranial process. Limited by motion. MRI is more sensitive for the detection of acute nonhemorrhagic infarct. 2. Moderate changes small vessel ischemic disease of indeterminate age, presumably mostly chronic. Volume loss. Atherosclerosis. 3. Paranasal sinus disease. Fluid could represent acute sinusitis. Right mastoid effusion.  Electronically signed by:  Rasheed Mays M.D.  1/21/2023 7:51 PM Mountain Time    XR Chest 1 View    Result Date: 1/21/2023  Cardiomegaly with probable mild pulmonary edema and small bilateral pleural effusions. Electronically signed by:  Franklyn Monzon M.D.  1/21/2023 8:30 PM Mountain Time                                         Medical Decision Making  Amount and/or Complexity of Data Reviewed  Independent Historian: caregiver     Details: Daughter at bedside  Labs: ordered. Decision-making details documented in ED Course.  Radiology: ordered. Decision-making details documented in ED Course.  ECG/medicine tests: ordered. Decision-making details documented in ED Course.      Risk  Prescription drug management.          Appropriate PPE worn during patient interactions.   Differential Dx: UTI, electrolyte imbalance, arrhythmia  This is not an all inclusive list of diagnosis considered.   Patient was brought back to the emergency department room for evaluation and placed on appropriate monitoring.  Patient underwent the above exam and work-up.  She presented from the nursing facility for being unresponsive for 1/2 hours.  The patient's not had any unresponsive episodes here.  She is pleasant, cooperative, alert and oriented without any focal deficits.  She will be given some Lasix as there is concern for fluid overload.  Admitted to  hospital service  Labs: Troponin 0.359, which is decreased from previous.  CMP reviewed.  BNP 16,252.  Positive for rhinovirus on RVP.  UA not indicative of UTI.  CBC reviewed.  Radiology: CT head reveals no acute abnormality.  Chest x-ray interpreted per radiologist, viewed by me reveals pulmonary edema, pleural effusions.  EKG noted as above  Tests/studies considered but not performed:   Patient refusal of studies/treatments:   Disposition : Admission or Discharge: Patient will be admitted to hospitalist service for further evaluation and management.  Note Disclaimer: At [facility], we believe that sharing information builds trust and better relationships. You are receiving this note because you recently visited [facility]. It is possible you will see health information before a provider has talked with you about it. This kind of information can be easy to misunderstand. To help you fully understand what it means for your health, we urge you to discuss this note with your provider.  Note dictated utilizing Dragon Dictation.     Final diagnoses:   Congestive heart failure, unspecified HF chronicity, unspecified heart failure type (HCC)   Elevated troponin       ED Disposition  ED Disposition     ED Disposition   Decision to Admit    Condition   --    Comment   Level of Care: Telemetry [5]   Diagnosis: Congestive heart failure, unspecified HF chronicity, unspecified heart failure type (HCC) [[id number]]   Admitting Physician: CATHY JACKSON [[id number]]   Attending Physician: CATHY JACKSON [[id number]]               No follow-up provider specified.       Medication List      No changes were made to your prescriptions during this visit.          Pita Alvarez, APRN  01/21/23 5558

## 2023-01-22 NOTE — PLAN OF CARE
Goal Outcome Evaluation:  Plan of Care Reviewed With: patient        Progress: no change  Outcome Evaluation: Patient appearing to rest throughout the day. awaiting echo results. Patient in bed with call light in reach.

## 2023-01-22 NOTE — H&P
"    [facility] Medicine Services  History & Physical    Patient Name: Nory Duff  : 1940  MRN: [id number]  Primary Care Physician:  Giovani Mejia MD  Date of admission: 2023  Date and Time of Service: 2023 at 2333    Subjective      Chief Complaint: altered mental status    Patient alert and oriented times self only, no family at bedside.  HPI obtained from ED providers notes with additional editing.  History of Present Illness: Nory Duff is a 82 y.o. female who presented to [facility] on 2023 for possible altered mental status per EMS. EMS reports when they arrived the patient was awake alert and oriented x3.  The patient does report that she \"just does not feel well\".  She reports that she is unsure why specifically, she reports some mild abdominal discomfort and feels nauseated.  Denies chest pain.  No shortness of breath.  Patient was admitted to this facility on 1/15/2023 for non-STEMI.  Patient's daughter/POA made the decision not to proceed with cardiac catheterization at that time and wanted medical management of CAD.  Patient was discharged on 2023.     In the ED, CT head showed no acute intracranial process.  Chest x-ray showed cardiomegaly with probable mild pulmonary edema and small bilateral pleural effusions.  EKG showed sinus rhythm.  Respiratory panel showed positive rhinovirus.  All labs unremarkable except proBNP 16,252, troponin 0.359, creatinine 1.34, albumin 2.6, hemoglobin 11.3, glucose 178.  All vital signs unremarkable.  Patient received Lasix in the ED.  Patient admitted to hospitalist service for further evaluation and treatment.    Review of Systems   Unable to perform ROS: dementia        Personal History     Past Medical History:   Diagnosis Date   • Anemia    • Atherosclerotic heart disease of native coronary artery without angina pectoris    • COPD (chronic obstructive pulmonary " disease) (HCC)    • COVID-19 03/2020   • Dementia (HCC)    • Diabetes mellitus (HCC)    • GERD (gastroesophageal reflux disease)    • Gout    • Hyperlipidemia    • Hypertension    • Localized edema    • Parkinson's disease (HCC)    • Vitamin D deficiency        Past Surgical History:   Procedure Laterality Date   • ENDOSCOPY N/A 1/2/2021    Procedure: ESOPHAGOGASTRODUODENOSCOPY;  Surgeon: Yoan Mendoza MD;  Location: [facility] ENDOSCOPY;  Service: Gastroenterology;  Laterality: N/A;  post: esophageal trauma from nasogastric tube, large hiatal hernia   • ENDOSCOPY N/A 5/6/2022    Procedure: ESOPHAGOGASTRODUODENOSCOPY with nasogastric tube placement;  Surgeon: Saleem Rubin MD;  Location: [facility] ENDOSCOPY;  Service: Gastroenterology;  Laterality: N/A;  post: large hiatal hernia, nasogastric tube placement       Family History: Family history is unknown by patient. Otherwise pertinent FHx was reviewed and not pertinent to current issue.    Social History:  reports that she has never smoked. She has never used smokeless tobacco. She reports that she does not drink alcohol and does not use drugs.    Home Medications:  Prior to Admission Medications     Prescriptions Last Dose Informant Patient Reported? Taking?    acetaminophen (TYLENOL) 325 MG tablet  Nursing Home Yes No    Take 650 mg by mouth Every 6 (Six) Hours As Needed for Mild Pain  or Fever.    allopurinol (ZYLOPRIM) 100 MG tablet  Nursing Home Yes No    Take 200 mg by mouth Daily.    aspirin 81 MG EC tablet  Nursing Home Yes No    Take 81 mg by mouth Daily.    atorvastatin (LIPITOR) 20 MG tablet   No No    Take 1 tablet by mouth Every Night.    budesonide-formoterol (Symbicort) 160-4.5 MCG/ACT inhaler   No No    Inhale 2 puffs 2 (Two) Times a Day.    carbidopa-levodopa (SINEMET)  MG per tablet  Nursing Home Yes No    Take 1 tablet by mouth 3 (Three) Times a Day.    clopidogrel (PLAVIX) 75 MG tablet  Nursing Home Yes No    Take 75 mg by mouth  Daily.    furosemide (LASIX) 20 MG tablet   Yes No    Take 20 mg by mouth 2 (Two) Times a Day.    insulin glargine (LANTUS, SEMGLEE) 100 UNIT/ML injection   Yes No    Inject 20 Units under the skin into the appropriate area as directed Every Morning.    ipratropium-albuterol (DUO-NEB) 0.5-2.5 mg/3 ml nebulizer   Yes No    Take 3 mL by nebulization Every 4 (Four) Hours As Needed for Wheezing.    magnesium hydroxide (MILK OF MAGNESIA) 400 MG/5ML suspension  Nursing Home Yes No    Take 30 mL by mouth Daily As Needed for Constipation.    metoprolol tartrate (LOPRESSOR) 25 MG tablet   No No    Take 1 tablet by mouth 2 (Two) Times a Day.    nitroglycerin (NITROSTAT) 0.4 MG SL tablet   No No    Place 1 tablet under the tongue Every 5 (Five) Minutes As Needed for Chest Pain (Only if SBP Greater Than 100). Take no more than 3 doses in 15 minutes.    Omega-3 Fatty Acids (fish oil) 1000 MG capsule capsule   Yes No    Take 1,000 mg by mouth Daily With Breakfast.    ondansetron (ZOFRAN) 4 MG tablet   No No    Take 1 tablet by mouth Every 6 (Six) Hours As Needed for Nausea or Vomiting.    pantoprazole (PROTONIX) 40 MG EC tablet   No No    Take 1 tablet by mouth Daily.            Allergies:  Allergies   Allergen Reactions   • Codeine Anaphylaxis   • Latex Anaphylaxis   • Namenda [Memantine] Anaphylaxis       Objective      Vitals:   Temp:  [98.2 °F (36.8 °C)] 98.2 °F (36.8 °C)  Heart Rate:  [67-76] 67  Resp:  [20-21] 21  BP: (106-140)/(50-71) 139/56    Physical Exam  Vitals and nursing note reviewed.   Constitutional:       Appearance: Normal appearance.   HENT:      Head: Normocephalic.      Right Ear: External ear normal.      Left Ear: External ear normal.      Nose: Nose normal.      Mouth/Throat:      Pharynx: Oropharynx is clear.   Eyes:      Extraocular Movements: Extraocular movements intact.   Cardiovascular:      Rate and Rhythm: Normal rate and regular rhythm.      Pulses: Normal pulses.      Heart sounds: Normal heart  sounds.   Pulmonary:      Effort: Pulmonary effort is normal.      Breath sounds: Normal breath sounds.   Abdominal:      General: Bowel sounds are normal.      Palpations: Abdomen is soft.   Musculoskeletal:         General: Swelling present. Normal range of motion.      Cervical back: Normal range of motion.      Right lower leg: Edema present.      Left lower leg: Edema present.      Comments: Periorbital edema and slight BLE edema   Skin:     General: Skin is warm and dry.      Findings: Bruising present.      Comments: Scattered bruising to both arms   Neurological:      Mental Status: She is alert. She is disoriented.   Psychiatric:         Mood and Affect: Mood normal.         Behavior: Behavior normal.          Result Review    Result Review:  I have personally reviewed the results from the time of this admission to 1/22/2023 00:27 EST and agree with these findings:  [x]  Laboratory  [x]  Microbiology  [x]  Radiology  [x]  EKG/Telemetry   [x]  Cardiology/Vascular   []  Pathology  []  Old records  []  Other:  Most notable findings include: as above      Assessment & Plan        Active Hospital Problems:  Active Hospital Problems    Diagnosis    • **Congestive heart failure, unspecified HF chronicity, unspecified heart failure type ([facility])    • History of non-ST elevation myocardial infarction (NSTEMI)    • COPD (chronic obstructive pulmonary disease) ([facility])    • Obesity (BMI 30-39.9)    • GERD (gastroesophageal reflux disease)    • Hypertension    • Dementia ([facility])    • Atherosclerotic heart disease of native coronary artery without angina pectoris    • Gout    • Parkinson's disease ([facility])    • Cardiomegaly    • CKD (chronic kidney disease) stage 4, GFR 15-29 ml/min ([facility])    • Hyperlipidemia    • Type 2 diabetes mellitus without complication, with long-term current use of insulin ([facility])    • Anemia in other chronic diseases classified elsewhere      Plan:     Congestive heart failure, unspecified HF chronicity,  unspecified heart failure type  Cardiomegaly  -Chest x-ray showed cardiomegaly with probable mild pulmonary edema and small bilateral pleural effusions  -EKG showed sinus rhythm   -proBNP 20618, baseline 7496  -Initial troponin 0.359, serial troponins ordered  -Lipid panel on 1/16/2023 unremarkable except HDL 35,   -TSH 4.29 on 1/16/2023  -EF 56 to 60% on 5/9/2022  -Echo ordered  -Cardiology consult    History of non-ST elevation myocardial infarction (NSTEMI)   Atherosclerotic heart disease of native coronary artery without angina pectoris  Hyperlipidemia  Hypertension  -BP controlled  -NSTEMI on 1/15/2023, family decided against cardiac catheterization, proceeding with med management  -Trend troponins    Rhinovirus  -Respiratory panel showed positive rhinovirus  -Treat symptomatically     COPD (chronic obstructive pulmonary disease)  -Not in exacerbation    CKD (chronic kidney disease) stage 3b   -Creatinine 1.34, baseline 1.28  -GFR 39.2, baseline 35    Anemia in other chronic diseases classified elsewhere  -Hemoglobin 11.3  -Monitor    Gout    Type 2 diabetes mellitus without complication, with long-term current use of insulin  -Accu-Cheks and sliding scale insulin    GERD (gastroesophageal reflux disease)    Dementia   -Alert and oriented x1 (self) currently    Obesity (BMI 30-39.9)  -BMI 33.83  -Encourage lifestyle and dietary modifications    Parkinson's disease    DVT prophylaxis:  Medical DVT prophylaxis orders are present.    CODE STATUS:       Admission Status:  I believe this patient meets observation status.    I discussed the patient's findings and my recommendations with patient.    This patient has been examined wearing appropriate Personal Protective Equipment and discussed w. 01/22/23      Signature: Electronically signed by MAE Stallworth, 01/22/23, 00:27 EST.  [facility] Hospitalist Team

## 2023-01-23 LAB
ALBUMIN SERPL-MCNC: 3 G/DL (ref 3.5–5.2)
ALBUMIN/GLOB SERPL: 0.8 G/DL
ALP SERPL-CCNC: 105 U/L (ref 39–117)
ALT SERPL W P-5'-P-CCNC: 7 U/L (ref 1–33)
ANION GAP SERPL CALCULATED.3IONS-SCNC: 12 MMOL/L (ref 5–15)
AST SERPL-CCNC: 10 U/L (ref 1–32)
BASOPHILS # BLD AUTO: 0.1 10*3/MM3 (ref 0–0.2)
BASOPHILS NFR BLD AUTO: 0.9 % (ref 0–1.5)
BILIRUB SERPL-MCNC: 0.6 MG/DL (ref 0–1.2)
BUN SERPL-MCNC: 24 MG/DL (ref 8–23)
BUN/CREAT SERPL: 17.1 (ref 7–25)
CALCIUM SPEC-SCNC: 9.4 MG/DL (ref 8.6–10.5)
CHLORIDE SERPL-SCNC: 102 MMOL/L (ref 98–107)
CO2 SERPL-SCNC: 30 MMOL/L (ref 22–29)
CREAT SERPL-MCNC: 1.4 MG/DL (ref 0.57–1)
DEPRECATED RDW RBC AUTO: 51.2 FL (ref 37–54)
EGFRCR SERPLBLD CKD-EPI 2021: 37.2 ML/MIN/1.73
EOSINOPHIL # BLD AUTO: 0.2 10*3/MM3 (ref 0–0.4)
EOSINOPHIL NFR BLD AUTO: 2.3 % (ref 0.3–6.2)
ERYTHROCYTE [DISTWIDTH] IN BLOOD BY AUTOMATED COUNT: 15.8 % (ref 12.3–15.4)
GLOBULIN UR ELPH-MCNC: 3.6 GM/DL
GLUCOSE BLDC GLUCOMTR-MCNC: 112 MG/DL (ref 70–105)
GLUCOSE BLDC GLUCOMTR-MCNC: 114 MG/DL (ref 70–105)
GLUCOSE BLDC GLUCOMTR-MCNC: 117 MG/DL (ref 70–105)
GLUCOSE BLDC GLUCOMTR-MCNC: 128 MG/DL (ref 70–105)
GLUCOSE BLDC GLUCOMTR-MCNC: 83 MG/DL (ref 70–105)
GLUCOSE SERPL-MCNC: 118 MG/DL (ref 65–99)
HCT VFR BLD AUTO: 42.9 % (ref 34–46.6)
HGB BLD-MCNC: 13.6 G/DL (ref 12–15.9)
LYMPHOCYTES # BLD AUTO: 1.9 10*3/MM3 (ref 0.7–3.1)
LYMPHOCYTES NFR BLD AUTO: 21.3 % (ref 19.6–45.3)
MAGNESIUM SERPL-MCNC: 2 MG/DL (ref 1.6–2.4)
MCH RBC QN AUTO: 29.5 PG (ref 26.6–33)
MCHC RBC AUTO-ENTMCNC: 31.7 G/DL (ref 31.5–35.7)
MCV RBC AUTO: 93.1 FL (ref 79–97)
MONOCYTES # BLD AUTO: 1 10*3/MM3 (ref 0.1–0.9)
MONOCYTES NFR BLD AUTO: 11.7 % (ref 5–12)
NEUTROPHILS NFR BLD AUTO: 5.6 10*3/MM3 (ref 1.7–7)
NEUTROPHILS NFR BLD AUTO: 63.8 % (ref 42.7–76)
NRBC BLD AUTO-RTO: 0.1 /100 WBC (ref 0–0.2)
PLATELET # BLD AUTO: 332 10*3/MM3 (ref 140–450)
PMV BLD AUTO: 8.3 FL (ref 6–12)
POTASSIUM SERPL-SCNC: 3.9 MMOL/L (ref 3.5–5.2)
PROT SERPL-MCNC: 6.6 G/DL (ref 6–8.5)
QT INTERVAL: 280 MS
QT INTERVAL: 317 MS
RBC # BLD AUTO: 4.6 10*6/MM3 (ref 3.77–5.28)
SODIUM SERPL-SCNC: 144 MMOL/L (ref 136–145)
WBC NRBC COR # BLD: 8.8 10*3/MM3 (ref 3.4–10.8)

## 2023-01-23 PROCEDURE — 94799 UNLISTED PULMONARY SVC/PX: CPT

## 2023-01-23 PROCEDURE — 94664 DEMO&/EVAL PT USE INHALER: CPT

## 2023-01-23 PROCEDURE — 83735 ASSAY OF MAGNESIUM: CPT | Performed by: NURSE PRACTITIONER

## 2023-01-23 PROCEDURE — 82962 GLUCOSE BLOOD TEST: CPT

## 2023-01-23 PROCEDURE — G0378 HOSPITAL OBSERVATION PER HR: HCPCS

## 2023-01-23 PROCEDURE — 96375 TX/PRO/DX INJ NEW DRUG ADDON: CPT

## 2023-01-23 PROCEDURE — 25010000002 DIGOXIN PER 500 MCG: Performed by: INTERNAL MEDICINE

## 2023-01-23 PROCEDURE — 25010000002 HEPARIN (PORCINE) PER 1000 UNITS: Performed by: NURSE PRACTITIONER

## 2023-01-23 PROCEDURE — 93010 ELECTROCARDIOGRAM REPORT: CPT | Performed by: INTERNAL MEDICINE

## 2023-01-23 PROCEDURE — 93005 ELECTROCARDIOGRAM TRACING: CPT | Performed by: INTERNAL MEDICINE

## 2023-01-23 PROCEDURE — 80053 COMPREHEN METABOLIC PANEL: CPT | Performed by: NURSE PRACTITIONER

## 2023-01-23 PROCEDURE — 25010000002 FUROSEMIDE PER 20 MG: Performed by: NURSE PRACTITIONER

## 2023-01-23 PROCEDURE — 96372 THER/PROPH/DIAG INJ SC/IM: CPT

## 2023-01-23 PROCEDURE — 85025 COMPLETE CBC W/AUTO DIFF WBC: CPT | Performed by: NURSE PRACTITIONER

## 2023-01-23 PROCEDURE — 99215 OFFICE O/P EST HI 40 MIN: CPT | Performed by: INTERNAL MEDICINE

## 2023-01-23 PROCEDURE — 96376 TX/PRO/DX INJ SAME DRUG ADON: CPT

## 2023-01-23 PROCEDURE — 94640 AIRWAY INHALATION TREATMENT: CPT

## 2023-01-23 RX ORDER — ALLOPURINOL 100 MG/1
200 TABLET ORAL DAILY
Status: DISCONTINUED | OUTPATIENT
Start: 2023-01-23 | End: 2023-01-24 | Stop reason: HOSPADM

## 2023-01-23 RX ORDER — FUROSEMIDE 20 MG/1
20 TABLET ORAL 2 TIMES DAILY
Status: DISCONTINUED | OUTPATIENT
Start: 2023-01-23 | End: 2023-01-23

## 2023-01-23 RX ORDER — IPRATROPIUM BROMIDE AND ALBUTEROL SULFATE 2.5; .5 MG/3ML; MG/3ML
3 SOLUTION RESPIRATORY (INHALATION) EVERY 4 HOURS PRN
Status: DISCONTINUED | OUTPATIENT
Start: 2023-01-23 | End: 2023-01-24 | Stop reason: HOSPADM

## 2023-01-23 RX ORDER — DIGOXIN 0.25 MG/ML
250 INJECTION INTRAMUSCULAR; INTRAVENOUS ONCE
Status: COMPLETED | OUTPATIENT
Start: 2023-01-23 | End: 2023-01-23

## 2023-01-23 RX ORDER — CLOPIDOGREL BISULFATE 75 MG/1
75 TABLET ORAL DAILY
Status: DISCONTINUED | OUTPATIENT
Start: 2023-01-23 | End: 2023-01-24 | Stop reason: HOSPADM

## 2023-01-23 RX ORDER — BUDESONIDE AND FORMOTEROL FUMARATE DIHYDRATE 160; 4.5 UG/1; UG/1
2 AEROSOL RESPIRATORY (INHALATION)
Status: DISCONTINUED | OUTPATIENT
Start: 2023-01-23 | End: 2023-01-24 | Stop reason: HOSPADM

## 2023-01-23 RX ORDER — ASPIRIN 81 MG/1
81 TABLET ORAL DAILY
Status: DISCONTINUED | OUTPATIENT
Start: 2023-01-23 | End: 2023-01-24 | Stop reason: HOSPADM

## 2023-01-23 RX ORDER — PANTOPRAZOLE SODIUM 40 MG/1
40 TABLET, DELAYED RELEASE ORAL DAILY
Status: DISCONTINUED | OUTPATIENT
Start: 2023-01-23 | End: 2023-01-24 | Stop reason: HOSPADM

## 2023-01-23 RX ORDER — METOPROLOL TARTRATE 5 MG/5ML
2.5 INJECTION INTRAVENOUS ONCE
Status: COMPLETED | OUTPATIENT
Start: 2023-01-23 | End: 2023-01-23

## 2023-01-23 RX ORDER — ATORVASTATIN CALCIUM 20 MG/1
20 TABLET, FILM COATED ORAL NIGHTLY
Status: DISCONTINUED | OUTPATIENT
Start: 2023-01-23 | End: 2023-01-24 | Stop reason: HOSPADM

## 2023-01-23 RX ADMIN — DIGOXIN 250 MCG: 0.25 INJECTION INTRAMUSCULAR; INTRAVENOUS at 11:32

## 2023-01-23 RX ADMIN — Medication 10 ML: at 08:35

## 2023-01-23 RX ADMIN — ATORVASTATIN CALCIUM 20 MG: 20 TABLET, FILM COATED ORAL at 20:42

## 2023-01-23 RX ADMIN — ASPIRIN 81 MG: 81 TABLET, COATED ORAL at 09:29

## 2023-01-23 RX ADMIN — SODIUM CHLORIDE 250 ML: 9 INJECTION, SOLUTION INTRAVENOUS at 09:52

## 2023-01-23 RX ADMIN — SODIUM CHLORIDE 250 ML: 9 INJECTION, SOLUTION INTRAVENOUS at 11:32

## 2023-01-23 RX ADMIN — Medication 10 ML: at 01:57

## 2023-01-23 RX ADMIN — METOPROLOL TARTRATE 25 MG: 25 TABLET, FILM COATED ORAL at 20:42

## 2023-01-23 RX ADMIN — FUROSEMIDE 30 MG: 10 INJECTION, SOLUTION INTRAMUSCULAR; INTRAVENOUS at 06:05

## 2023-01-23 RX ADMIN — ALLOPURINOL 200 MG: 100 TABLET ORAL at 09:29

## 2023-01-23 RX ADMIN — METOPROLOL TARTRATE 2.5 MG: 1 INJECTION, SOLUTION INTRAVENOUS at 08:50

## 2023-01-23 RX ADMIN — CARBIDOPA AND LEVODOPA 1 TABLET: 25; 100 TABLET ORAL at 09:29

## 2023-01-23 RX ADMIN — HEPARIN SODIUM 5000 UNITS: 5000 INJECTION INTRAVENOUS; SUBCUTANEOUS at 08:35

## 2023-01-23 RX ADMIN — ACETAMINOPHEN 650 MG: 325 TABLET, FILM COATED ORAL at 20:42

## 2023-01-23 RX ADMIN — CLOPIDOGREL BISULFATE 75 MG: 75 TABLET ORAL at 09:29

## 2023-01-23 RX ADMIN — CARBIDOPA AND LEVODOPA 1 TABLET: 25; 100 TABLET ORAL at 20:42

## 2023-01-23 RX ADMIN — BUDESONIDE AND FORMOTEROL FUMARATE DIHYDRATE 2 PUFF: 160; 4.5 AEROSOL RESPIRATORY (INHALATION) at 19:39

## 2023-01-23 RX ADMIN — METOPROLOL TARTRATE 25 MG: 25 TABLET, FILM COATED ORAL at 15:51

## 2023-01-23 RX ADMIN — HEPARIN SODIUM 5000 UNITS: 5000 INJECTION INTRAVENOUS; SUBCUTANEOUS at 20:42

## 2023-01-23 RX ADMIN — CARBIDOPA AND LEVODOPA 1 TABLET: 25; 100 TABLET ORAL at 15:01

## 2023-01-23 RX ADMIN — PANTOPRAZOLE SODIUM 40 MG: 40 TABLET, DELAYED RELEASE ORAL at 09:29

## 2023-01-23 RX ADMIN — Medication 10 ML: at 20:43

## 2023-01-23 NOTE — PLAN OF CARE
Goal Outcome Evaluation:      Pt was resting with eyes closed during entire shift. No complaints.

## 2023-01-23 NOTE — PLAN OF CARE
Goal Outcome Evaluation:                 Patient running tachycardic and hypotensive this morning. EKG was obtained and showed afib with rvr. Hospitalist and cardiologist called. Obtained orders from hospialist for a 250 mL bolus of NS and 2.5 mg lopressor. This was not effective. Called cardiology a second time and got orders for digoxin IV and another 250 mL bolus of NS. Patient's heart rate is now 94 and bolus is still being given. Will continue to observe.

## 2023-01-23 NOTE — PROGRESS NOTES
CARDIOLOGY PROGRESS NOTE:    Nory Duff  82 y.o.  female  1940  [id number]      Referring Provider: Hospitalist    Reason for follow-up: Shortness of breath     Patient Care Team:  Giovani Mejia MD as PCP - General (Geriatric Medicine)  Sameer Lai MD as Surgeon (General Surgery)    Subjective .  Patient is feeling better but still has shortness of breath    Objective  Lying in bed comfortably     Review of Systems   Constitutional: Negative for malaise/fatigue.   Cardiovascular: Negative for chest pain, dyspnea on exertion, leg swelling and palpitations.   Respiratory: Positive for shortness of breath. Negative for cough.    Gastrointestinal: Negative for abdominal pain, nausea and vomiting.   Neurological: Negative for dizziness, focal weakness, headaches, light-headedness and numbness.   All other systems reviewed and are negative.      Codeine, Latex, and Namenda [memantine]    Scheduled Meds:allopurinol, 200 mg, Oral, Daily  aspirin, 81 mg, Oral, Daily  atorvastatin, 20 mg, Oral, Nightly  budesonide-formoterol, 2 puff, Inhalation, BID - RT  carbidopa-levodopa, 1 tablet, Oral, TID  clopidogrel, 75 mg, Oral, Daily  furosemide, 20 mg, Oral, BID  heparin (porcine), 5,000 Units, Subcutaneous, Q12H  insulin lispro, 3-14 Units, Subcutaneous, 4x Daily With Meals & Nightly  metoprolol tartrate, 25 mg, Oral, BID  pantoprazole, 40 mg, Oral, Daily  sodium chloride, 10 mL, Intravenous, Q12H      Continuous Infusions:   PRN Meds:.•  acetaminophen **OR** acetaminophen **OR** acetaminophen  •  albuterol sulfate HFA  •  aluminum-magnesium hydroxide-simethicone  •  benzonatate  •  calcium carbonate  •  dextrose  •  dextrose  •  glucagon (human recombinant)  •  ipratropium-albuterol  •  magnesium sulfate **OR** magnesium sulfate **OR** magnesium sulfate  •  nitroglycerin  •  ondansetron **OR** ondansetron  •  potassium chloride  •  potassium chloride  •  senna-docusate sodium  •  [COMPLETED]  "Insert Peripheral IV **AND** sodium chloride  •  sodium chloride  •  sodium chloride        VITAL SIGNS  Vitals:    01/23/23 0549 01/23/23 0812 01/23/23 1047 01/23/23 1100   BP:  98/66 100/73 (!) 83/52   BP Location:  Right arm Right arm Right arm   Patient Position:  Lying Lying Lying   Pulse:  (!) 150  (!) 147   Resp:  16     Temp:  97.3 °F (36.3 °C)     TempSrc:  Oral     SpO2:  99%  99%   Weight: 80.6 kg (177 lb 11.1 oz)      Height:           Flowsheet Rows    Flowsheet Row First Filed Value   Admission Height 160 cm (63\") Documented at 01/21/2023 1929   Admission Weight 86.6 kg (191 lb) Documented at 01/21/2023 1929           TELEMETRY: Atrial fibrillation controlled ventricular response    Physical Exam:  Constitutional:       Appearance: Well-developed.   Eyes:      General: No scleral icterus.     Conjunctiva/sclera: Conjunctivae normal.   HENT:      Head: Normocephalic and atraumatic.   Neck:      Vascular: No carotid bruit or JVD.   Pulmonary:      Effort: Pulmonary effort is normal.      Breath sounds: Normal breath sounds. No wheezing. No rales.   Cardiovascular:      Normal rate. Regular rhythm.   Pulses:     Intact distal pulses.   Abdominal:      General: Bowel sounds are normal.      Palpations: Abdomen is soft.   Musculoskeletal:      Cervical back: Normal range of motion and neck supple. Skin:     General: Skin is warm and dry.      Findings: No rash.   Neurological:      Mental Status: Alert.          Results Review:   I reviewed the patient's new clinical results.  Lab Results (last 24 hours)     Procedure Component Value Units Date/Time    POC Glucose Once [[id number]]  (Abnormal) Collected: 01/23/23 1104    Specimen: Blood Updated: 01/23/23 1105     Glucose 112 mg/dL      Comment: Serial Number: [id number]:  [id number]       Comprehensive Metabolic Panel [[id number]]  (Abnormal) Collected: 01/23/23 0937    Specimen: Blood Updated: 01/23/23 1011     Glucose 118 mg/dL      BUN 24 mg/dL      " Creatinine 1.40 mg/dL      Sodium 144 mmol/L      Potassium 3.9 mmol/L      Chloride 102 mmol/L      CO2 30.0 mmol/L      Calcium 9.4 mg/dL      Total Protein 6.6 g/dL      Albumin 3.0 g/dL      ALT (SGPT) 7 U/L      AST (SGOT) 10 U/L      Alkaline Phosphatase 105 U/L      Total Bilirubin 0.6 mg/dL      Globulin 3.6 gm/dL      A/G Ratio 0.8 g/dL      BUN/Creatinine Ratio 17.1     Anion Gap 12.0 mmol/L      eGFR 37.2 mL/min/1.73     Narrative:      GFR Normal >60  Chronic Kidney Disease <60  Kidney Failure <15    The GFR formula is only valid for adults with stable renal function between ages 18 and 70.    Magnesium [[id number]]  (Normal) Collected: 01/23/23 0937    Specimen: Blood Updated: 01/23/23 1011     Magnesium 2.0 mg/dL     CBC & Differential [[id number]]  (Abnormal) Collected: 01/23/23 0937    Specimen: Blood Updated: 01/23/23 0955    Narrative:      The following orders were created for panel order CBC & Differential.  Procedure                               Abnormality         Status                     ---------                               -----------         ------                     CBC Auto Differential[[id number]]        Abnormal            Final result                 Please view results for these tests on the individual orders.    CBC Auto Differential [[id number]]  (Abnormal) Collected: 01/23/23 0937    Specimen: Blood Updated: 01/23/23 0955     WBC 8.80 10*3/mm3      RBC 4.60 10*6/mm3      Hemoglobin 13.6 g/dL      Hematocrit 42.9 %      MCV 93.1 fL      MCH 29.5 pg      MCHC 31.7 g/dL      RDW 15.8 %      RDW-SD 51.2 fl      MPV 8.3 fL      Platelets 332 10*3/mm3      Neutrophil % 63.8 %      Lymphocyte % 21.3 %      Monocyte % 11.7 %      Eosinophil % 2.3 %      Basophil % 0.9 %      Neutrophils, Absolute 5.60 10*3/mm3      Lymphocytes, Absolute 1.90 10*3/mm3      Monocytes, Absolute 1.00 10*3/mm3      Eosinophils, Absolute 0.20 10*3/mm3      Basophils, Absolute 0.10 10*3/mm3      nRBC 0.1 /100  WBC     POC Glucose Once [[id number]]  (Normal) Collected: 01/23/23 0657    Specimen: Blood Updated: 01/23/23 0658     Glucose 83 mg/dL      Comment: Serial Number: [id number]:  [id number]       CANDIDA AURIS SCREEN - Swab, Axilla Right, Axilla Left and Groin [[id number]]  (Normal) Collected: 01/22/23 0108    Specimen: Swab from Axilla Right, Axilla Left and Groin Updated: 01/23/23 0230     Candida Auris Screen Culture No Candida auris isolated at 24 hours    Troponin [[id number]]  (Abnormal) Collected: 01/22/23 1805    Specimen: Blood Updated: 01/22/23 1857     Troponin T 0.389 ng/mL     Narrative:      Troponin T Reference Range:  <= 0.03 ng/mL-   Negative for AMI  >0.03 ng/mL-     Abnormal for myocardial necrosis.  Clinicians would have to utilize clinical acumen, EKG, Troponin and serial changes to determine if it is an Acute Myocardial Infarction or myocardial injury due to an underlying chronic condition.       Results may be falsely decreased if patient taking Biotin.      Comprehensive Metabolic Panel [[id number]]  (Abnormal) Collected: 01/22/23 1805    Specimen: Blood Updated: 01/22/23 1850     Glucose 158 mg/dL      BUN 23 mg/dL      Creatinine 1.36 mg/dL      Sodium 143 mmol/L      Potassium 4.3 mmol/L      Chloride 102 mmol/L      CO2 32.0 mmol/L      Calcium 9.0 mg/dL      Total Protein 6.7 g/dL      Albumin 3.2 g/dL      ALT (SGPT) 7 U/L      AST (SGOT) 7 U/L      Alkaline Phosphatase 109 U/L      Total Bilirubin 0.4 mg/dL      Globulin 3.5 gm/dL      A/G Ratio 0.9 g/dL      BUN/Creatinine Ratio 16.9     Anion Gap 9.0 mmol/L      eGFR 38.5 mL/min/1.73     Narrative:      GFR Normal >60  Chronic Kidney Disease <60  Kidney Failure <15    The GFR formula is only valid for adults with stable renal function between ages 18 and 70.    Magnesium [[id number]]  (Normal) Collected: 01/22/23 1805    Specimen: Blood Updated: 01/22/23 1850     Magnesium 2.0 mg/dL     aPTT [[id number]]  (Abnormal) Collected:  01/22/23 1805    Specimen: Blood Updated: 01/22/23 1840     PTT 36.9 seconds     Protime-INR [[id number]]  (Abnormal) Collected: 01/22/23 1805    Specimen: Blood Updated: 01/22/23 1840     Protime 12.7 Seconds      INR 1.25    CBC & Differential [[id number]]  (Abnormal) Collected: 01/22/23 1805    Specimen: Blood Updated: 01/22/23 1818    Narrative:      The following orders were created for panel order CBC & Differential.  Procedure                               Abnormality         Status                     ---------                               -----------         ------                     CBC Auto Differential[[id number]]        Abnormal            Final result                 Please view results for these tests on the individual orders.    CBC Auto Differential [[id number]]  (Abnormal) Collected: 01/22/23 1805    Specimen: Blood Updated: 01/22/23 1818     WBC 7.40 10*3/mm3      RBC 3.90 10*6/mm3      Hemoglobin 11.7 g/dL      Hematocrit 36.3 %      MCV 93.1 fL      MCH 30.1 pg      MCHC 32.4 g/dL      RDW 16.1 %      RDW-SD 51.6 fl      MPV 8.6 fL      Platelets 265 10*3/mm3      Neutrophil % 63.4 %      Lymphocyte % 22.8 %      Monocyte % 11.1 %      Eosinophil % 2.2 %      Basophil % 0.5 %      Neutrophils, Absolute 4.70 10*3/mm3      Lymphocytes, Absolute 1.70 10*3/mm3      Monocytes, Absolute 0.80 10*3/mm3      Eosinophils, Absolute 0.20 10*3/mm3      Basophils, Absolute 0.00 10*3/mm3      nRBC 0.0 /100 WBC     POC Glucose Once [[id number]]  (Abnormal) Collected: 01/22/23 1803    Specimen: Blood Updated: 01/22/23 1804     Glucose 158 mg/dL      Comment: Serial Number: [id number]:  [id number]             Imaging Results (Last 24 Hours)     ** No results found for the last 24 hours. **          EKG      I personally viewed and interpreted the patient's EKG/Telemetry data:    ECHOCARDIOGRAM:    STRESS MYOVIEW:    CARDIAC CATHETERIZATION:    OTHER:         Assessment & Plan     Principal Problem:     Congestive heart failure, unspecified HF chronicity, unspecified heart failure type ([facility])  Active Problems:    Type 2 diabetes mellitus without complication, with long-term current use of insulin ([facility])    Hyperlipidemia    GERD (gastroesophageal reflux disease)    Hypertension    Dementia ([facility])    Atherosclerotic heart disease of native coronary artery without angina pectoris    Gout    Anemia in other chronic diseases classified elsewhere    Obesity (BMI 30-39.9)    Parkinson's disease ([facility])    Cardiomegaly    Stage 3b chronic kidney disease ([facility])    COPD (chronic obstructive pulmonary disease) ([facility])    History of non-ST elevation myocardial infarction (NSTEMI)    Rhinovirus  Atrial fibrillation with rapid regular response.  Hypertension    Patient currently with shortness of breath and abdominal pain and nausea  Patient had elevated troponin in the past and also now and then decline cardiac catheterization they want any conservative medical therapy and hence she is on aspirin Plavix metoprolol and statins  This morning patient went into atrial fibrillation with rapid response and also has hypotension  Patient will get IV digoxin and IV fluid bolus and will watch her blood pressure and heart rate.    I discussed the patients findings and my recommendations with patient and nurse    Virgilio Recinos MD  01/23/23  12:43 EST

## 2023-01-23 NOTE — CASE MANAGEMENT/SOCIAL WORK
Discharge Planning Assessment   Malcolm     Patient Name: Nory Duff  MRN: [id number]  Today's Date: 1/23/2023    Admit Date: 1/21/2023    Plan: D/C plan: Anticipate return to [facility], [place], [place] to return, no precert required.   Discharge Needs Assessment     Row Name 01/23/23 1258       Living Environment    People in Home facility resident  LTC resident from [facility]    Current Living Arrangements extended care facility  LTC at [facility]    Primary Care Provided by other (see comments)  [facility] staff    Provides Primary Care For no one, unable/limited ability to care for self    Family Caregiver if Needed child(elizabeth), adult    Quality of Family Relationships unable to assess  No family present at bedside during CM rounds    Able to Return to Prior Arrangements yes       Resource/Environmental Concerns    Resource/Environmental Concerns none    Transportation Concerns none       Transition Planning    Patient/Family Anticipates Transition to long-term care facility    Patient/Family Anticipated Services at Transition none    Transportation Anticipated family or friend will provide       Discharge Needs Assessment    Readmission Within the Last 30 Days no previous admission in last 30 days    Equipment Currently Used at Home other (see comments)  LT resident    Concerns to be Addressed discharge planning    Anticipated Changes Related to Illness none    Equipment Needed After Discharge none    Discharge Facility/Level of Care Needs nursing facility, intermediate    Provided Post Acute Provider List? N/A    N/A Provider List Comment From [facility] [place], will return at d/c.               Discharge Plan     Row Name 01/23/23 1254       Plan    Plan D/C plan: Anticipate return to [facility], [place], [place] to return, no precert required.    Patient/Family in Agreement with Plan yes    Plan Comments Met with pt at bedside. Confirmed pt is from [facility] [place] and will return at time of d/c. Per  liaison, pt needs precert started before returning. ANNIE sent secure chat to MD to inquire if ready for precert to be started. Per Dr. Waggoner, start precert today. CM notified liakeke Ontiveros, to start precert today. Pt states her daughter Rosemary should be able to drive her back to [facility]. CM later received clarification from liaekke, pt will return under her medicaid and does not need precert.              Continued Care and Services - Admitted Since 1/21/2023     Destination     Service Provider Request Status Selected Services Address Phone Fax Patient Preferred    [facility] Accepted N/A [address] [place] [id number] [phone] --                          Demographic Summary     Row Name 01/23/23 1258       General Information    Admission Type observation    Arrived From emergency department    Required Notices Provided Observation Status Notice    Referral Source admission list    Reason for Consult discharge planning    Preferred Language English               Functional Status     Row Name 01/23/23 1258       Functional Status    Usual Activity Tolerance fair    Current Activity Tolerance fair       Functional Status, IADL    Medications assistive equipment and person    Meal Preparation assistive equipment and person    Housekeeping assistive equipment and person    Laundry assistive equipment and person    Shopping assistive equipment and person                      Patient Forms     Row Name 01/23/23 1056       Patient Forms    Important Message from Medicare (IMM) --  MACIAS 1/21 per registration    Patient Observation Letter --  MACIAS 1/21 per registration              Met with patient in room wearing PPE: mask  Maintained distance greater than six feet and spent less than 15 minutes in the room.          Garret Mckoy RN

## 2023-01-23 NOTE — CONSULTS
CARDIOLOGY CONSULT:    Nory Duff  1940  female  [id number]      Referring Provider: Hospitalist  Reason  for Consultation: Not feeling well    Patient Care Team:  Giovani Mejia MD as PCP - General (Geriatric Medicine)  Sameer Lai MD as Surgeon (General Surgery)    Chief complaint abdominal pain and not feeling well    Subjective .     History of present illness:  Nory Duff is a 82 y.o. female with history of coronary disease dementia diabetes hypertension hyperlipidemia presented to the hospital with complaints of abdominal discomfort nausea and not feeling well.  Patient was brought by the daughter but she was not at bedside when I saw the patient.  Patient recently was in the hospital with non-STEMI and they have decided not to do any cardiac catheterization but only conservative medical therapy.  Patient is not having any symptoms of chest pain no complains of any PND orthopnea.  No palpitation dizziness syncope.  She has some shortness of breath with exertion.  Review of Systems   Constitutional: Positive for malaise/fatigue. Negative for fever.   HENT: Negative for ear pain and nosebleeds.    Eyes: Negative for blurred vision and double vision.   Cardiovascular: Negative for chest pain, dyspnea on exertion and palpitations.   Respiratory: Positive for shortness of breath. Negative for cough.    Skin: Negative for rash.   Musculoskeletal: Negative for joint pain.   Gastrointestinal: Positive for abdominal pain and nausea. Negative for vomiting.   Neurological: Negative for focal weakness and headaches.   Psychiatric/Behavioral: Negative for depression. The patient is not nervous/anxious.    All other systems reviewed and are negative.      History  Past Medical History:   Diagnosis Date   • Anemia    • Atherosclerotic heart disease of native coronary artery without angina pectoris    • COPD (chronic obstructive pulmonary disease) ([facility])    • COVID-19 03/2020   •  Dementia (HCC)    • Diabetes mellitus (HCC)    • GERD (gastroesophageal reflux disease)    • Gout    • Hyperlipidemia    • Hypertension    • Localized edema    • Parkinson's disease (HCC)    • Vitamin D deficiency        Past Surgical History:   Procedure Laterality Date   • ENDOSCOPY N/A 1/2/2021    Procedure: ESOPHAGOGASTRODUODENOSCOPY;  Surgeon: Yoan Mendoza MD;  Location: [facility] ENDOSCOPY;  Service: Gastroenterology;  Laterality: N/A;  post: esophageal trauma from nasogastric tube, large hiatal hernia   • ENDOSCOPY N/A 5/6/2022    Procedure: ESOPHAGOGASTRODUODENOSCOPY with nasogastric tube placement;  Surgeon: Saleem Rubin MD;  Location: [facility] ENDOSCOPY;  Service: Gastroenterology;  Laterality: N/A;  post: large hiatal hernia, nasogastric tube placement       Family History   Family history unknown: Yes       Social History     Tobacco Use   • Smoking status: Never   • Smokeless tobacco: Never   Vaping Use   • Vaping Use: Never used   Substance Use Topics   • Alcohol use: Never   • Drug use: Never        Medications Prior to Admission   Medication Sig Dispense Refill Last Dose   • ALBUTEROL IN Inhale.   1/21/2023   • allopurinol (ZYLOPRIM) 100 MG tablet Take 200 mg by mouth Daily.   1/21/2023   • aspirin 81 MG EC tablet Take 81 mg by mouth Daily.   1/21/2023   • carbidopa-levodopa (SINEMET)  MG per tablet Take 1 tablet by mouth 3 (Three) Times a Day.   1/21/2023   • clopidogrel (PLAVIX) 75 MG tablet Take 75 mg by mouth Daily.   1/21/2023   • furosemide (LASIX) 20 MG tablet Take 20 mg by mouth 2 (Two) Times a Day.   1/21/2023   • insulin glargine (LANTUS, SEMGLEE) 100 UNIT/ML injection Inject 20 Units under the skin into the appropriate area as directed Every Morning.   1/21/2023   • metoprolol tartrate (LOPRESSOR) 25 MG tablet Take 1 tablet by mouth 2 (Two) Times a Day. 60 tablet 0 1/21/2023   • Omega-3 Fatty Acids (fish oil) 1000 MG capsule capsule Take 1,000 mg by mouth Daily With  Breakfast.   1/21/2023   • pantoprazole (PROTONIX) 40 MG EC tablet Take 1 tablet by mouth Daily.   1/21/2023   • acetaminophen (TYLENOL) 325 MG tablet Take 650 mg by mouth Every 6 (Six) Hours As Needed for Mild Pain  or Fever.   Unknown   • atorvastatin (LIPITOR) 20 MG tablet Take 1 tablet by mouth Every Night. 30 tablet 0 Unknown   • budesonide-formoterol (Symbicort) 160-4.5 MCG/ACT inhaler Inhale 2 puffs 2 (Two) Times a Day. 1 each 12 Unknown   • ipratropium-albuterol (DUO-NEB) 0.5-2.5 mg/3 ml nebulizer Take 3 mL by nebulization Every 4 (Four) Hours As Needed for Wheezing.   Unknown   • magnesium hydroxide (MILK OF MAGNESIA) 400 MG/5ML suspension Take 30 mL by mouth Daily As Needed for Constipation.   Unknown   • nitroglycerin (NITROSTAT) 0.4 MG SL tablet Place 1 tablet under the tongue Every 5 (Five) Minutes As Needed for Chest Pain (Only if SBP Greater Than 100). Take no more than 3 doses in 15 minutes. 30 tablet 12    • ondansetron (ZOFRAN) 4 MG tablet Take 1 tablet by mouth Every 6 (Six) Hours As Needed for Nausea or Vomiting.   Unknown         Codeine, Latex, and Namenda [memantine]    Scheduled Meds:furosemide, 30 mg, Intravenous, Q12H  heparin (porcine), 5,000 Units, Subcutaneous, Q12H  insulin lispro, 3-14 Units, Subcutaneous, 4x Daily With Meals & Nightly  sodium chloride, 10 mL, Intravenous, Q12H      Continuous Infusions:   PRN Meds:.•  acetaminophen **OR** acetaminophen **OR** acetaminophen  •  albuterol sulfate HFA  •  aluminum-magnesium hydroxide-simethicone  •  benzonatate  •  calcium carbonate  •  dextrose  •  dextrose  •  glucagon (human recombinant)  •  magnesium sulfate **OR** magnesium sulfate **OR** magnesium sulfate  •  nitroglycerin  •  ondansetron **OR** ondansetron  •  potassium chloride  •  potassium chloride  •  senna-docusate sodium  •  [COMPLETED] Insert Peripheral IV **AND** sodium chloride  •  sodium chloride  •  sodium chloride    Objective     VITAL SIGNS  Vitals:    01/22/23 0112  "01/22/23 0700 01/22/23 1232 01/22/23 1607   BP: 134/57  134/57 101/70   BP Location: Left arm   Right arm   Patient Position: Lying   Lying   Pulse: 71 66  82   Resp: 20 16  16   Temp: 97.3 °F (36.3 °C) 97.9 °F (36.6 °C)  97.7 °F (36.5 °C)   TempSrc: Oral Axillary     SpO2: 96% 92%  96%   Weight: 83.4 kg (183 lb 13.8 oz)  83 kg (183 lb)    Height: 160 cm (63\")  160 cm (63\")        Flowsheet Rows    Flowsheet Row First Filed Value   Admission Height 160 cm (63\") Documented at 01/21/2023 1929   Admission Weight 86.6 kg (191 lb) Documented at 01/21/2023 1929           TELEMETRY: Normal sinus rhythm    Physical Exam:  Constitutional:       Appearance: Well-developed.   Eyes:      General: No scleral icterus.     Conjunctiva/sclera: Conjunctivae normal.      Pupils: Pupils are equal, round, and reactive to light.   HENT:      Head: Normocephalic and atraumatic.   Neck:      Vascular: No carotid bruit or JVD.   Pulmonary:      Effort: Pulmonary effort is normal.      Breath sounds: Normal breath sounds. No wheezing. No rales.   Cardiovascular:      Normal rate. Regular rhythm.      Murmurs: There is a systolic murmur.   Pulses:     Intact distal pulses.   Abdominal:      General: Bowel sounds are normal.      Palpations: Abdomen is soft.   Musculoskeletal: Normal range of motion.      Cervical back: Normal range of motion and neck supple. Skin:     General: Skin is warm and dry.      Findings: No rash.   Neurological:      Mental Status: Alert.      Comments: No focal deficits          Results Review:   I reviewed the patient's new clinical results.  Lab Results (last 24 hours)     Procedure Component Value Units Date/Time    Troponin [[id number]]  (Abnormal) Collected: 01/22/23 1805    Specimen: Blood Updated: 01/22/23 1857     Troponin T 0.389 ng/mL     Narrative:      Troponin T Reference Range:  <= 0.03 ng/mL-   Negative for AMI  >0.03 ng/mL-     Abnormal for myocardial necrosis.  Clinicians would have to utilize " clinical acumen, EKG, Troponin and serial changes to determine if it is an Acute Myocardial Infarction or myocardial injury due to an underlying chronic condition.       Results may be falsely decreased if patient taking Biotin.      Comprehensive Metabolic Panel [[id number]]  (Abnormal) Collected: 01/22/23 1805    Specimen: Blood Updated: 01/22/23 1850     Glucose 158 mg/dL      BUN 23 mg/dL      Creatinine 1.36 mg/dL      Sodium 143 mmol/L      Potassium 4.3 mmol/L      Chloride 102 mmol/L      CO2 32.0 mmol/L      Calcium 9.0 mg/dL      Total Protein 6.7 g/dL      Albumin 3.2 g/dL      ALT (SGPT) 7 U/L      AST (SGOT) 7 U/L      Alkaline Phosphatase 109 U/L      Total Bilirubin 0.4 mg/dL      Globulin 3.5 gm/dL      A/G Ratio 0.9 g/dL      BUN/Creatinine Ratio 16.9     Anion Gap 9.0 mmol/L      eGFR 38.5 mL/min/1.73     Narrative:      GFR Normal >60  Chronic Kidney Disease <60  Kidney Failure <15    The GFR formula is only valid for adults with stable renal function between ages 18 and 70.    Magnesium [[id number]]  (Normal) Collected: 01/22/23 1805    Specimen: Blood Updated: 01/22/23 1850     Magnesium 2.0 mg/dL     aPTT [[id number]]  (Abnormal) Collected: 01/22/23 1805    Specimen: Blood Updated: 01/22/23 1840     PTT 36.9 seconds     Protime-INR [[id number]]  (Abnormal) Collected: 01/22/23 1805    Specimen: Blood Updated: 01/22/23 1840     Protime 12.7 Seconds      INR 1.25    CBC & Differential [[id number]]  (Abnormal) Collected: 01/22/23 1805    Specimen: Blood Updated: 01/22/23 1818    Narrative:      The following orders were created for panel order CBC & Differential.  Procedure                               Abnormality         Status                     ---------                               -----------         ------                     CBC Auto Differential[[id number]]        Abnormal            Final result                 Please view results for these tests on the individual orders.    CBC Auto  Differential [[id number]]  (Abnormal) Collected: 01/22/23 1805    Specimen: Blood Updated: 01/22/23 1818     WBC 7.40 10*3/mm3      RBC 3.90 10*6/mm3      Hemoglobin 11.7 g/dL      Hematocrit 36.3 %      MCV 93.1 fL      MCH 30.1 pg      MCHC 32.4 g/dL      RDW 16.1 %      RDW-SD 51.6 fl      MPV 8.6 fL      Platelets 265 10*3/mm3      Neutrophil % 63.4 %      Lymphocyte % 22.8 %      Monocyte % 11.1 %      Eosinophil % 2.2 %      Basophil % 0.5 %      Neutrophils, Absolute 4.70 10*3/mm3      Lymphocytes, Absolute 1.70 10*3/mm3      Monocytes, Absolute 0.80 10*3/mm3      Eosinophils, Absolute 0.20 10*3/mm3      Basophils, Absolute 0.00 10*3/mm3      nRBC 0.0 /100 WBC     POC Glucose Once [[id number]]  (Abnormal) Collected: 01/22/23 1803    Specimen: Blood Updated: 01/22/23 1804     Glucose 158 mg/dL      Comment: Serial Number: [id number]:  [id number]       POC Glucose Once [[id number]]  (Abnormal) Collected: 01/22/23 1210    Specimen: Blood Updated: 01/22/23 1211     Glucose 151 mg/dL      Comment: Serial Number: [id number]:  [id number]       POC Glucose Once [[id number]]  (Abnormal) Collected: 01/22/23 0724    Specimen: Blood Updated: 01/22/23 0726     Glucose 120 mg/dL      Comment: Serial Number: [id number]:  [id number]       CANDIDA AURIS SCREEN - Swab, Axilla Right, Axilla Left and Groin [[id number]] Collected: 01/22/23 0108    Specimen: Swab from Axilla Right, Axilla Left and Groin Updated: 01/22/23 0227    Magnesium [[id number]]  (Normal) Collected: 01/21/23 2148    Specimen: Blood Updated: 01/21/23 2338     Magnesium 1.9 mg/dL     Troponin [[id number]]  (Abnormal) Collected: 01/21/23 2148    Specimen: Blood Updated: 01/21/23 2220     Troponin T 0.359 ng/mL     Narrative:      Troponin T Reference Range:  <= 0.03 ng/mL-   Negative for AMI  >0.03 ng/mL-     Abnormal for myocardial necrosis.  Clinicians would have to utilize clinical acumen, EKG, Troponin and serial changes to determine if it is an  Acute Myocardial Infarction or myocardial injury due to an underlying chronic condition.       Results may be falsely decreased if patient taking Biotin.            Imaging Results (Last 24 Hours)     Procedure Component Value Units Date/Time    XR Chest 1 View [[id number]] Collected: 01/21/23 2029     Updated: 01/21/23 2231    Narrative:      EXAMINATION: XR CHEST 1 VW      DATE OF EXAM: 1/21/2023 8:24 PM  SLOT: 60    HISTORY: Possible altered mental status     COMPARISON: 1/15/2023.    FINDINGS:    HEART/MEDIASTINUM: Enlarged cardiac silhouette, similar to prior.     LUNGS/PLEURA: Mild perihilar vascular indistinctness. Small bilateral pleural effusions.     MUSCULOSKELETAL: No acute osseous pathology.        Impression:        Cardiomegaly with probable mild pulmonary edema and small bilateral pleural effusions.    Electronically signed by:  Franklyn Monzon M.D.    1/21/2023 8:30 PM Mountain Time          EKG      I personally viewed and interpreted the patient's EKG/Telemetry data:    ECHOCARDIOGRAM:      STRESS MYOVIEW:    CARDIAC CATHETERIZATION:    OTHER:         Assessment & Plan     Principal Problem:    Congestive heart failure, unspecified HF chronicity, unspecified heart failure type ([facility])  Active Problems:    Type 2 diabetes mellitus without complication, with long-term current use of insulin ([facility])    Hyperlipidemia    GERD (gastroesophageal reflux disease)    Hypertension    Dementia ([facility])    Atherosclerotic heart disease of native coronary artery without angina pectoris    Gout    Anemia in other chronic diseases classified elsewhere    Obesity (BMI 30-39.9)    Parkinson's disease ([facility])    Cardiomegaly    Stage 3b chronic kidney disease ([facility])    COPD (chronic obstructive pulmonary disease) ([facility])    History of non-ST elevation myocardial infarction (NSTEMI)    Rhinovirus      Patient presented with abdominal pain and nausea and is seen by the primary care  Patient has borderline blood troponin advised  at last time also  Patient and family declined cardiac catheterization  Patient is on conservative medical therapy only with aspirin Plavix metoprolol and statins  Patient is abdominal discomfort nausea and is having work-up done  Patient also had some mental status changes initially but she is clear now and answering questions  Patient had a CT scan which did not show any abnormalities  No further cardiac work-up    I discussed the patients findings and my recommendations with patient and nurse    Virgilio Recinos MD  01/22/23  22:17 EST

## 2023-01-23 NOTE — PROGRESS NOTES
[facility] Medicine Services Daily Progress Note    Patient Name: Nory Duff  : 1940  MRN: [id number]  Primary Care Physician:  Giovani Mejia MD  Date of admission: 2023      Subjective      Chief Complaint: Change in mental status    Patient Reports   2023  Patient seen and examined  Has history of dementia and is oriented to self only  Lives in extended-care facility  Was noted to have atrial fibrillation with rapid ventricular response earlier today but at that time was hypotensive-was given IV fluid and also digoxin     patient is DNR  Was recently diagnosed with NSTEMI  But cardiac cath could not be done at that time due to advanced CKD.  On medical management.    Discussed with the daughter who is the POA- Baseline patient does not have any quality of life.  Hospice consult will be requested.        Review of Systems   Unable to perform ROS: dementia          Objective      Vitals:   Temp:  [97.3 °F (36.3 °C)-98.8 °F (37.1 °C)] 97.4 °F (36.3 °C)  Heart Rate:  [] 144  Resp:  [16] 16  BP: ()/(52-80) 108/74    Physical Exam  Vitals reviewed.   Constitutional:       Comments: Chronically ill looking   HENT:      Head: Normocephalic.      Nose: Nose normal.      Mouth/Throat:      Mouth: Mucous membranes are dry.   Eyes:      Extraocular Movements: Extraocular movements intact.      Conjunctiva/sclera: Conjunctivae normal.      Pupils: Pupils are equal, round, and reactive to light.   Cardiovascular:      Rate and Rhythm: Tachycardia present. Rhythm irregular.   Pulmonary:      Comments: Decreased air entry at the bases  Abdominal:      General: Bowel sounds are normal.      Palpations: Abdomen is soft.      Tenderness: There is no abdominal tenderness.   Musculoskeletal:      Cervical back: Neck supple.      Comments: Degenerative changes   Skin:     General: Skin is warm.   Neurological:      Mental Status: She is alert.      Comments: Oriented  "to self only           Result Review    Result Review:  I have personally reviewed the results from the time of this admission to 1/23/2023 17:43 EST and agree with these findings:  [x]  Laboratory  []  Microbiology  [x]  Radiology  [x]  EKG/Telemetry   []  Cardiology/Vascular   []  Pathology  []  Old records  []  Other:  Most notable findings include:     Wounds (last 24 hours)     LDA Wound     Row Name               Wound 01/22/23 0140 Bilateral groin MASD (Moisture associated skin damage)    Wound - Properties Group Placement Date: 01/22/23  -KW Placement Time: 0140  -KW Present on Hospital Admission: Y  -KW Side: Bilateral  -KW Location: groin  -KW Primary Wound Type: MASD  -KW    Retired Wound - Properties Group Placement Date: 01/22/23  -KW Placement Time: 0140  -KW Present on Hospital Admission: Y  -KW Side: Bilateral  -KW Location: groin  -KW Primary Wound Type: MASD  -KW    Retired Wound - Properties Group Date first assessed: 01/22/23  -KW Time first assessed: 0140  -KW Present on Hospital Admission: Y  -KW Side: Bilateral  -KW Location: groin  -KW Primary Wound Type: MASD  -KW          User Key  (r) = Recorded By, (t) = Taken By, (c) = Cosigned By    Initials Name Provider Type    Kishore Soto LPN Licensed Nurse                  Assessment & Plan      Brief Patient Summary:    Patient is an 82 y.o. female who presented to [facility] from  [facility] on 1/21/2023 with  altered mental status .  The patient does report that she \"just does not feel well\".  She reports that she is unsure why specifically, she reports some mild abdominal discomfort and feels nauseated.  Denies chest pain.  No shortness of breath.      Patient was admitted to this facility on 1/15/2023 for non-STEMI.  Patient's daughter/POA made the decision not to proceed with cardiac catheterization at that time and wanted medical management of CAD.  Patient was discharged on 1/16/2023.      In the ED, CT head " showed no acute intracranial process.  Chest x-ray showed cardiomegaly with probable mild pulmonary edema and small bilateral pleural effusions.  EKG showed sinus rhythm.  Respiratory panel showed positive rhinovirus.  All labs unremarkable except proBNP 16,252, troponin 0.359, creatinine 1.34, albumin 2.6, hemoglobin 11.3, glucose 178.    All vital signs unremarkable.  Patient received Lasix in the ED.    Patient admitted to hospitalist service for further evaluation and treatment      allopurinol, 200 mg, Oral, Daily  aspirin, 81 mg, Oral, Daily  atorvastatin, 20 mg, Oral, Nightly  budesonide-formoterol, 2 puff, Inhalation, BID - RT  carbidopa-levodopa, 1 tablet, Oral, TID  clopidogrel, 75 mg, Oral, Daily  heparin (porcine), 5,000 Units, Subcutaneous, Q12H  insulin lispro, 3-14 Units, Subcutaneous, 4x Daily With Meals & Nightly  metoprolol tartrate, 25 mg, Oral, BID  pantoprazole, 40 mg, Oral, Daily  sodium chloride, 10 mL, Intravenous, Q12H             Active Hospital Problems:  Active Hospital Problems    Diagnosis    • **Congestive heart failure, unspecified HF chronicity, unspecified heart failure type ([facility])    • Rhinovirus    • History of non-ST elevation myocardial infarction (NSTEMI)    • COPD (chronic obstructive pulmonary disease) ([facility])    • Obesity (BMI 30-39.9)    • GERD (gastroesophageal reflux disease)    • Hypertension    • Dementia ([facility])    • Atherosclerotic heart disease of native coronary artery without angina pectoris    • Gout    • Parkinson's disease ([facility])    • Cardiomegaly    • Stage 3b chronic kidney disease ([facility])    • Hyperlipidemia    • Type 2 diabetes mellitus without complication, with long-term current use of insulin ([facility])    • Anemia in other chronic diseases classified elsewhere      Plan:     Shortness of breath  Elevated troponin and proBNP  Chest x-ray suggestive of mild pulmonary edema and small bilateral pleural effusion  Recently diagnosed with non-ST elevation MI 1/15/2022  however cardiac cath was not done as patient has advanced CKD and conservative medical management elected at that time.  On aspirin, Plavix, metoprolol and statin.  2D echo pending    Atrial fibrillation with rapid ventricular response  In the setting of hypotension.    Was given IV fluid bolus and also IV digoxin  Cardiologist following  Monitor       Rhinovirus  Respiratory panel showed positive rhinovirus  Supportive care     COPD (chronic obstructive pulmonary disease)  Not in exacerbation     CKD (chronic kidney disease) stage 3b   Serum creatinine around baseline  Hyperuricemia-on allopurinol  Monitor      Anemia in other chronic diseases classified elsewhere  Hemoglobin stable       Type 2 diabetes mellitus without complication, with long-term current use of insulin  Accu-Cheks and sliding scale insulin     GERD (gastroesophageal reflux disease)     Dementia   Alert and oriented x1 (self) currently     Obesity (BMI 30-39.9)  BMI 33.83       Parkinson's disease-on carbidopa levodopa       DVT prophylaxis:  Medical DVT prophylaxis orders are present.    CODE STATUS:    Code Status (Patient has no pulse and is not breathing): No CPR (Do Not Attempt to Resuscitate)  Medical Interventions (Patient has pulse or is breathing): Full Support      Disposition: Pending clinical progress    This patient has been examined with appropriate PPE . 01/23/23      Electronically signed by Steve Waggoner MD, 01/23/23, 17:43 EST.  [facility] Hospitalist Team

## 2023-01-24 VITALS
BODY MASS INDEX: 29.41 KG/M2 | WEIGHT: 166.01 LBS | RESPIRATION RATE: 18 BRPM | HEIGHT: 63 IN | HEART RATE: 60 BPM | TEMPERATURE: 97.9 F | SYSTOLIC BLOOD PRESSURE: 92 MMHG | OXYGEN SATURATION: 94 % | DIASTOLIC BLOOD PRESSURE: 56 MMHG

## 2023-01-24 LAB
ALBUMIN SERPL-MCNC: 3 G/DL (ref 3.5–5.2)
ALBUMIN/GLOB SERPL: 0.9 G/DL
ALP SERPL-CCNC: 91 U/L (ref 39–117)
ALT SERPL W P-5'-P-CCNC: <5 U/L (ref 1–33)
ANION GAP SERPL CALCULATED.3IONS-SCNC: 9 MMOL/L (ref 5–15)
AST SERPL-CCNC: 14 U/L (ref 1–32)
BASOPHILS # BLD AUTO: 0 10*3/MM3 (ref 0–0.2)
BASOPHILS NFR BLD AUTO: 0.7 % (ref 0–1.5)
BH CV ECHO MEAS - ACS: 1.87 CM
BH CV ECHO MEAS - AO MAX PG: 2.9 MMHG
BH CV ECHO MEAS - AO MEAN PG: 1.65 MMHG
BH CV ECHO MEAS - AO ROOT DIAM: 3.4 CM
BH CV ECHO MEAS - AO V2 MAX: 85.2 CM/SEC
BH CV ECHO MEAS - AO V2 VTI: 14.4 CM
BH CV ECHO MEAS - AVA(I,D): 2.5 CM2
BH CV ECHO MEAS - EDV(CUBED): 60.2 ML
BH CV ECHO MEAS - EDV(MOD-SP4): 64.4 ML
BH CV ECHO MEAS - EF(MOD-SP4): 63.8 %
BH CV ECHO MEAS - ESV(CUBED): 22.2 ML
BH CV ECHO MEAS - ESV(MOD-SP4): 23.3 ML
BH CV ECHO MEAS - FS: 28.3 %
BH CV ECHO MEAS - IVS/LVPW: 0.91 CM
BH CV ECHO MEAS - IVSD: 1.12 CM
BH CV ECHO MEAS - LA DIMENSION: 4.8 CM
BH CV ECHO MEAS - LV DIASTOLIC VOL/BSA (35-75): 34.6 CM2
BH CV ECHO MEAS - LV MASS(C)D: 154.8 GRAMS
BH CV ECHO MEAS - LV MAX PG: 2.18 MMHG
BH CV ECHO MEAS - LV MEAN PG: 1.18 MMHG
BH CV ECHO MEAS - LV SYSTOLIC VOL/BSA (12-30): 12.5 CM2
BH CV ECHO MEAS - LV V1 MAX: 73.9 CM/SEC
BH CV ECHO MEAS - LV V1 VTI: 13.7 CM
BH CV ECHO MEAS - LVIDD: 3.9 CM
BH CV ECHO MEAS - LVIDS: 2.8 CM
BH CV ECHO MEAS - LVOT AREA: 2.6 CM2
BH CV ECHO MEAS - LVOT DIAM: 1.83 CM
BH CV ECHO MEAS - LVPWD: 1.23 CM
BH CV ECHO MEAS - MV A MAX VEL: 110.6 CM/SEC
BH CV ECHO MEAS - MV DEC SLOPE: 239.3 CM/SEC2
BH CV ECHO MEAS - MV DEC TIME: 0.27 MSEC
BH CV ECHO MEAS - MV E MAX VEL: 65 CM/SEC
BH CV ECHO MEAS - MV E/A: 0.59
BH CV ECHO MEAS - MV MAX PG: 5.9 MMHG
BH CV ECHO MEAS - MV MEAN PG: 1.92 MMHG
BH CV ECHO MEAS - MV V2 VTI: 27.9 CM
BH CV ECHO MEAS - MVA(VTI): 1.29 CM2
BH CV ECHO MEAS - PA ACC TIME: 0.1 SEC
BH CV ECHO MEAS - PA PR(ACCEL): 33.8 MMHG
BH CV ECHO MEAS - PA V2 MAX: 85.3 CM/SEC
BH CV ECHO MEAS - PULM A REVS DUR: 0.12 SEC
BH CV ECHO MEAS - PULM A REVS VEL: 36.3 CM/SEC
BH CV ECHO MEAS - PULM DIAS VEL: 28.8 CM/SEC
BH CV ECHO MEAS - PULM S/D: 1.61
BH CV ECHO MEAS - PULM SYS VEL: 46.3 CM/SEC
BH CV ECHO MEAS - RAP SYSTOLE: 3 MMHG
BH CV ECHO MEAS - RV MAX PG: 1.7 MMHG
BH CV ECHO MEAS - RV V1 MAX: 65.1 CM/SEC
BH CV ECHO MEAS - RV V1 VTI: 13.1 CM
BH CV ECHO MEAS - RVDD: 2.33 CM
BH CV ECHO MEAS - RVSP: 20.7 MMHG
BH CV ECHO MEAS - SI(MOD-SP4): 22.1 ML/M2
BH CV ECHO MEAS - SV(LVOT): 35.9 ML
BH CV ECHO MEAS - SV(MOD-SP4): 41.1 ML
BH CV ECHO MEAS - TR MAX PG: 17.7 MMHG
BH CV ECHO MEAS - TR MAX VEL: 210.5 CM/SEC
BILIRUB SERPL-MCNC: 0.4 MG/DL (ref 0–1.2)
BUN SERPL-MCNC: 34 MG/DL (ref 8–23)
BUN/CREAT SERPL: 19.2 (ref 7–25)
CALCIUM SPEC-SCNC: 8.7 MG/DL (ref 8.6–10.5)
CHLORIDE SERPL-SCNC: 101 MMOL/L (ref 98–107)
CO2 SERPL-SCNC: 31 MMOL/L (ref 22–29)
CREAT SERPL-MCNC: 1.77 MG/DL (ref 0.57–1)
DEPRECATED RDW RBC AUTO: 52.5 FL (ref 37–54)
EGFRCR SERPLBLD CKD-EPI 2021: 28.1 ML/MIN/1.73
EOSINOPHIL # BLD AUTO: 0.2 10*3/MM3 (ref 0–0.4)
EOSINOPHIL NFR BLD AUTO: 2.7 % (ref 0.3–6.2)
ERYTHROCYTE [DISTWIDTH] IN BLOOD BY AUTOMATED COUNT: 15.7 % (ref 12.3–15.4)
GLOBULIN UR ELPH-MCNC: 3.4 GM/DL
GLUCOSE BLDC GLUCOMTR-MCNC: 134 MG/DL (ref 70–105)
GLUCOSE BLDC GLUCOMTR-MCNC: 75 MG/DL (ref 70–105)
GLUCOSE SERPL-MCNC: 97 MG/DL (ref 65–99)
HCT VFR BLD AUTO: 37.6 % (ref 34–46.6)
HGB BLD-MCNC: 12.4 G/DL (ref 12–15.9)
LYMPHOCYTES # BLD AUTO: 2.1 10*3/MM3 (ref 0.7–3.1)
LYMPHOCYTES NFR BLD AUTO: 35.6 % (ref 19.6–45.3)
MAXIMAL PREDICTED HEART RATE: 136 BPM
MCH RBC QN AUTO: 30.1 PG (ref 26.6–33)
MCHC RBC AUTO-ENTMCNC: 33.1 G/DL (ref 31.5–35.7)
MCV RBC AUTO: 91.1 FL (ref 79–97)
MONOCYTES # BLD AUTO: 0.7 10*3/MM3 (ref 0.1–0.9)
MONOCYTES NFR BLD AUTO: 11.2 % (ref 5–12)
NEUTROPHILS NFR BLD AUTO: 2.9 10*3/MM3 (ref 1.7–7)
NEUTROPHILS NFR BLD AUTO: 49.8 % (ref 42.7–76)
NRBC BLD AUTO-RTO: 0.1 /100 WBC (ref 0–0.2)
PLATELET # BLD AUTO: 289 10*3/MM3 (ref 140–450)
PMV BLD AUTO: 8.4 FL (ref 6–12)
POTASSIUM SERPL-SCNC: 4.4 MMOL/L (ref 3.5–5.2)
PROT SERPL-MCNC: 6.4 G/DL (ref 6–8.5)
RBC # BLD AUTO: 4.13 10*6/MM3 (ref 3.77–5.28)
SODIUM SERPL-SCNC: 141 MMOL/L (ref 136–145)
STRESS TARGET HR: 116 BPM
WBC NRBC COR # BLD: 5.8 10*3/MM3 (ref 3.4–10.8)

## 2023-01-24 PROCEDURE — 94799 UNLISTED PULMONARY SVC/PX: CPT

## 2023-01-24 PROCEDURE — 85025 COMPLETE CBC W/AUTO DIFF WBC: CPT | Performed by: NURSE PRACTITIONER

## 2023-01-24 PROCEDURE — 99214 OFFICE O/P EST MOD 30 MIN: CPT | Performed by: INTERNAL MEDICINE

## 2023-01-24 PROCEDURE — 36415 COLL VENOUS BLD VENIPUNCTURE: CPT | Performed by: NURSE PRACTITIONER

## 2023-01-24 PROCEDURE — 82962 GLUCOSE BLOOD TEST: CPT

## 2023-01-24 PROCEDURE — 96372 THER/PROPH/DIAG INJ SC/IM: CPT

## 2023-01-24 PROCEDURE — 94761 N-INVAS EAR/PLS OXIMETRY MLT: CPT

## 2023-01-24 PROCEDURE — G0378 HOSPITAL OBSERVATION PER HR: HCPCS

## 2023-01-24 PROCEDURE — 25010000002 HEPARIN (PORCINE) PER 1000 UNITS: Performed by: NURSE PRACTITIONER

## 2023-01-24 PROCEDURE — 80053 COMPREHEN METABOLIC PANEL: CPT | Performed by: NURSE PRACTITIONER

## 2023-01-24 RX ORDER — INSULIN GLARGINE 100 [IU]/ML
10 INJECTION, SOLUTION SUBCUTANEOUS EVERY MORNING
Refills: 12
Start: 2023-01-24

## 2023-01-24 RX ADMIN — ASPIRIN 81 MG: 81 TABLET, COATED ORAL at 08:33

## 2023-01-24 RX ADMIN — CARBIDOPA AND LEVODOPA 1 TABLET: 25; 100 TABLET ORAL at 15:08

## 2023-01-24 RX ADMIN — CLOPIDOGREL BISULFATE 75 MG: 75 TABLET ORAL at 08:33

## 2023-01-24 RX ADMIN — Medication 10 ML: at 08:44

## 2023-01-24 RX ADMIN — ALLOPURINOL 200 MG: 100 TABLET ORAL at 08:33

## 2023-01-24 RX ADMIN — HEPARIN SODIUM 5000 UNITS: 5000 INJECTION INTRAVENOUS; SUBCUTANEOUS at 08:33

## 2023-01-24 RX ADMIN — BUDESONIDE AND FORMOTEROL FUMARATE DIHYDRATE 2 PUFF: 160; 4.5 AEROSOL RESPIRATORY (INHALATION) at 07:06

## 2023-01-24 RX ADMIN — CARBIDOPA AND LEVODOPA 1 TABLET: 25; 100 TABLET ORAL at 08:33

## 2023-01-24 RX ADMIN — PANTOPRAZOLE SODIUM 40 MG: 40 TABLET, DELAYED RELEASE ORAL at 08:33

## 2023-01-24 NOTE — PLAN OF CARE
Goal Outcome Evaluation:              Outcome Evaluation: Patient currently resting abed, no distress noted. Patient appears to have rested throughout the night. Patient's heart rate on monitor 50s - 60s.

## 2023-01-24 NOTE — CASE MANAGEMENT/SOCIAL WORK
Continued Stay Note  [facility]     Patient Name: Nory Duff  MRN: [id number]  Today's Date: 1/24/2023    Admit Date: 1/21/2023    Plan: D/C plan: Return to [facility]. No precert required. Referral to [facility] to f/u at facility.   Discharge Plan     Row Name 01/24/23 1303       Plan    Plan D/C plan: Return to [facility]. No precert required. Referral to [facility] to f/u at facility.    Patient/Family in Agreement with Plan yes    Provided Post Acute Provider List? Yes    Post Acute Provider List Hospice    Delivered To Support Person    Support Person Daughter/POROSI Rivas    Method of Delivery In person    Plan Comments CM was contacted by hospital MD for referral to hospice to follow up on pt when she returns to facility. CM met with pt's daughter/REESE Rivas outside of pt room today to discuss. CM provided list of options for hospice agencies. Evelyn is agreeable to plan, however wants to discuss with family before making decisions. Pt also needs transportation back to [facility]. CM contacted heather Ontiveros to inquire on transport. Per Rama, they can pick pt up today, unable to provide exact  time at this moment. CM will update bedside nurse once time is arranged for . Evelyn later called CM and decided on [facility]. CM sent referral to [facility] and notified heather Worley. Brunilda will f/u with Evelyn for EOS.                   Expected Discharge Date and Time     Expected Discharge Date Expected Discharge Time    Jan 24, 2023         Met with family wearing PPE: mask  Maintained distance greater than six feet and spent less than 15 minutes.          Garret Mckoy, RN

## 2023-01-24 NOTE — PROGRESS NOTES
CARDIOLOGY PROGRESS NOTE:    Nory Duff  82 y.o.  female  1940  [id number]      Referring Provider: Hospitalist    Reason for follow-up: Shortness of breath     Patient Care Team:  Giovani Mejia MD as PCP - General (Geriatric Medicine)  Sameer Lai MD as Surgeon (General Surgery)    Subjective .  Patient is feeling better but still has shortness of breath    Objective  Lying in bed comfortably     Review of Systems   Constitutional: Negative for malaise/fatigue.   Cardiovascular: Negative for chest pain, dyspnea on exertion, leg swelling and palpitations.   Respiratory: Positive for shortness of breath. Negative for cough.    Gastrointestinal: Negative for abdominal pain, nausea and vomiting.   Neurological: Negative for dizziness, focal weakness, headaches, light-headedness and numbness.   All other systems reviewed and are negative.      Codeine, Latex, and Namenda [memantine]    Scheduled Meds:allopurinol, 200 mg, Oral, Daily  aspirin, 81 mg, Oral, Daily  atorvastatin, 20 mg, Oral, Nightly  budesonide-formoterol, 2 puff, Inhalation, BID - RT  carbidopa-levodopa, 1 tablet, Oral, TID  clopidogrel, 75 mg, Oral, Daily  heparin (porcine), 5,000 Units, Subcutaneous, Q12H  insulin lispro, 3-14 Units, Subcutaneous, 4x Daily With Meals & Nightly  metoprolol tartrate, 25 mg, Oral, BID  pantoprazole, 40 mg, Oral, Daily  sodium chloride, 10 mL, Intravenous, Q12H      Continuous Infusions:   PRN Meds:.•  acetaminophen **OR** acetaminophen **OR** acetaminophen  •  albuterol sulfate HFA  •  aluminum-magnesium hydroxide-simethicone  •  benzonatate  •  calcium carbonate  •  dextrose  •  dextrose  •  glucagon (human recombinant)  •  ipratropium-albuterol  •  magnesium sulfate **OR** magnesium sulfate **OR** magnesium sulfate  •  nitroglycerin  •  ondansetron **OR** ondansetron  •  potassium chloride  •  potassium chloride  •  senna-docusate sodium  •  [COMPLETED] Insert Peripheral IV **AND**  "sodium chloride  •  sodium chloride  •  sodium chloride        VITAL SIGNS  Vitals:    01/24/23 0029 01/24/23 0613 01/24/23 0706 01/24/23 0708   BP: 92/56      BP Location: Left arm      Patient Position: Lying      Pulse: 59  61 60   Resp: 16  18 18   Temp: 97.9 °F (36.6 °C)      TempSrc: Oral      SpO2: 98%  94% 94%   Weight:  75.3 kg (166 lb 0.1 oz)     Height:           Flowsheet Rows    Flowsheet Row First Filed Value   Admission Height 160 cm (63\") Documented at 01/21/2023 1929   Admission Weight 86.6 kg (191 lb) Documented at 01/21/2023 1929           TELEMETRY: Atrial fibrillation controlled ventricular response    Physical Exam:  Constitutional:       Appearance: Well-developed.   Eyes:      General: No scleral icterus.     Conjunctiva/sclera: Conjunctivae normal.   HENT:      Head: Normocephalic and atraumatic.   Neck:      Vascular: No carotid bruit or JVD.   Pulmonary:      Effort: Pulmonary effort is normal.      Breath sounds: Normal breath sounds. No wheezing. No rales.   Cardiovascular:      Normal rate. Regular rhythm.   Pulses:     Intact distal pulses.   Abdominal:      General: Bowel sounds are normal.      Palpations: Abdomen is soft.   Musculoskeletal:      Cervical back: Normal range of motion and neck supple. Skin:     General: Skin is warm and dry.      Findings: No rash.   Neurological:      Mental Status: Alert.          Results Review:   I reviewed the patient's new clinical results.  Lab Results (last 24 hours)     Procedure Component Value Units Date/Time    POC Glucose Once [[id number]]  (Abnormal) Collected: 01/24/23 1026    Specimen: Blood Updated: 01/24/23 1028     Glucose 134 mg/dL      Comment: Serial Number: [id number]:  [id number]       Comprehensive Metabolic Panel [[id number]]  (Abnormal) Collected: 01/24/23 0650    Specimen: Blood Updated: 01/24/23 0808     Glucose 97 mg/dL      BUN 34 mg/dL      Creatinine 1.77 mg/dL      Sodium 141 mmol/L      Potassium 4.4 mmol/L      " Chloride 101 mmol/L      CO2 31.0 mmol/L      Calcium 8.7 mg/dL      Total Protein 6.4 g/dL      Albumin 3.0 g/dL      ALT (SGPT) <5 U/L      AST (SGOT) 14 U/L      Alkaline Phosphatase 91 U/L      Total Bilirubin 0.4 mg/dL      Globulin 3.4 gm/dL      A/G Ratio 0.9 g/dL      BUN/Creatinine Ratio 19.2     Anion Gap 9.0 mmol/L      eGFR 28.1 mL/min/1.73     Narrative:      GFR Normal >60  Chronic Kidney Disease <60  Kidney Failure <15    The GFR formula is only valid for adults with stable renal function between ages 18 and 70.    CBC & Differential [[id number]]  (Abnormal) Collected: 01/24/23 0650    Specimen: Blood Updated: 01/24/23 0734    Narrative:      The following orders were created for panel order CBC & Differential.  Procedure                               Abnormality         Status                     ---------                               -----------         ------                     CBC Auto Differential[[id number]]        Abnormal            Final result                 Please view results for these tests on the individual orders.    CBC Auto Differential [[id number]]  (Abnormal) Collected: 01/24/23 0650    Specimen: Blood Updated: 01/24/23 0734     WBC 5.80 10*3/mm3      RBC 4.13 10*6/mm3      Hemoglobin 12.4 g/dL      Hematocrit 37.6 %      MCV 91.1 fL      MCH 30.1 pg      MCHC 33.1 g/dL      RDW 15.7 %      RDW-SD 52.5 fl      MPV 8.4 fL      Platelets 289 10*3/mm3      Neutrophil % 49.8 %      Lymphocyte % 35.6 %      Monocyte % 11.2 %      Eosinophil % 2.7 %      Basophil % 0.7 %      Neutrophils, Absolute 2.90 10*3/mm3      Lymphocytes, Absolute 2.10 10*3/mm3      Monocytes, Absolute 0.70 10*3/mm3      Eosinophils, Absolute 0.20 10*3/mm3      Basophils, Absolute 0.00 10*3/mm3      nRBC 0.1 /100 WBC     POC Glucose Once [[id number]]  (Normal) Collected: 01/24/23 0709    Specimen: Blood Updated: 01/24/23 0711     Glucose 75 mg/dL      Comment: Serial Number: [id number]:  [id number]        DAVID AURIS SCREEN - Swab, Axilla Right, Axilla Left and Groin [[id number]]  (Normal) Collected: 01/22/23 0108    Specimen: Swab from Axilla Right, Axilla Left and Groin Updated: 01/24/23 0230     Candida Auris Screen Culture No Candida auris isolated at 2 days    POC Glucose Once [[id number]]  (Abnormal) Collected: 01/23/23 2135    Specimen: Blood Updated: 01/23/23 2136     Glucose 114 mg/dL      Comment: Serial Number: [id number]:  [id number]       POC Glucose Once [[id number]]  (Abnormal) Collected: 01/23/23 2032    Specimen: Blood Updated: 01/23/23 2034     Glucose 117 mg/dL      Comment: Serial Number: [id number]:  [id number]       POC Glucose Once [[id number]]  (Abnormal) Collected: 01/23/23 1704    Specimen: Blood Updated: 01/23/23 1705     Glucose 128 mg/dL      Comment: Serial Number: [id number]:  [id number]             Imaging Results (Last 24 Hours)     ** No results found for the last 24 hours. **          EKG      I personally viewed and interpreted the patient's EKG/Telemetry data:    ECHOCARDIOGRAM:    STRESS MYOVIEW:    CARDIAC CATHETERIZATION:    OTHER:         Assessment & Plan     Principal Problem:    Congestive heart failure, unspecified HF chronicity, unspecified heart failure type ([facility])  Active Problems:    Type 2 diabetes mellitus without complication, with long-term current use of insulin ([facility])    Hyperlipidemia    GERD (gastroesophageal reflux disease)    Hypertension    Dementia ([facility])    Atherosclerotic heart disease of native coronary artery without angina pectoris    Gout    Anemia in other chronic diseases classified elsewhere    Obesity (BMI 30-39.9)    Parkinson's disease ([facility])    Cardiomegaly    Stage 3b chronic kidney disease ([facility])    COPD (chronic obstructive pulmonary disease) ([facility])    History of non-ST elevation myocardial infarction (NSTEMI)    Rhinovirus  Atrial fibrillation with rapid regular response.  Hypertension    Patient currently with shortness of breath  and abdominal pain and nausea  Patient had elevated troponin in the past and also now and then decline cardiac catheterization they want any conservative medical therapy and hence she is on aspirin Plavix metoprolol and statins  This morning patient went into atrial fibrillation with rapid response and also has hypotension  Patient will get IV digoxin and IV fluid bolus and will watch her blood pressure and heart rate.    Patient got IV digoxin IV fluid bolus and since then her heart rate and blood pressure have been stable and she is in normal sinus rhythm  Patient's family was at bedside and discussed with me and they want to place comfort care for her.    I discussed the patients findings and my recommendations with patient and nurse    Virgilio Recinos MD  01/24/23  13:31 EST

## 2023-01-24 NOTE — DISCHARGE SUMMARY
[facility] Medicine Services  DISCHARGE SUMMARY    Patient Name: Nory Duff  : 1940  MRN: [id number]    Date of Admission: 2023  Discharge Diagnosis:     Shortness of breath  Elevated troponin and proBNP  Chest x-ray suggestive of mild pulmonary edema and small bilateral pleural effusion  Recently diagnosed with non-ST elevation MI 1/15/2022 however cardiac cath was not done as patient has advanced CKD and conservative medical management elected at that time.  On aspirin, Plavix, metoprolol and statin.  2D echo LVEF greater than 60%     Atrial fibrillation with rapid ventricular response  In the setting of hypotension.    Was given IV fluid bolus and also IV digoxin  Cardiologist following while in the patient  Improved- in normal sinus rhythm and rate controlled        Rhinovirus  Respiratory panel showed positive rhinovirus  Supportive care     COPD (chronic obstructive pulmonary disease)  Not in exacerbation     CKD (chronic kidney disease) stage 3b   Serum creatinine around baseline  Hyperuricemia-on allopurinol        Anemia in other chronic diseases classified elsewhere  Hemoglobin stable        Type 2 diabetes mellitus without complication, with long-term current use of insulin  Continue on home insulin regimen     GERD (gastroesophageal reflux disease)     Dementia   Alert and oriented x1 (self) currently     Obesity (BMI 30-39.9)  BMI 33.83        Parkinson's disease-on carbidopa levodopa        Date of Discharge: 2023  Primary Care Physician: Giovani Mejia MD      Presenting Problem:   Elevated troponin [R77.8]  Congestive heart failure, unspecified HF chronicity, unspecified heart failure type (HCC) [I50.9]    Active and Resolved Hospital Problems:  Active Hospital Problems    Diagnosis POA   • **Congestive heart failure, unspecified HF chronicity, unspecified heart failure type (HCC) [I50.9] Yes   • Rhinovirus [B34.8] Unknown   • History  "of non-ST elevation myocardial infarction (NSTEMI) [I25.2] Not Applicable   • COPD (chronic obstructive pulmonary disease) ([facility]) [J44.9] Yes   • Obesity (BMI 30-39.9) [E66.9] Yes   • GERD (gastroesophageal reflux disease) [K21.9] Yes   • Hypertension [I10] Yes   • Dementia ([facility]) [F03.90] Yes   • Atherosclerotic heart disease of native coronary artery without angina pectoris [I25.10] Yes   • Gout [M10.9] Yes   • Parkinson's disease ([facility]) [G20] Yes   • Cardiomegaly [I51.7] Yes   • Stage 3b chronic kidney disease ([facility]) [N18.32] Yes   • Hyperlipidemia [E78.5] Yes   • Type 2 diabetes mellitus without complication, with long-term current use of insulin ([facility]) [E11.9, Z79.4] Not Applicable   • Anemia in other chronic diseases classified elsewhere [D63.8] Yes      Resolved Hospital Problems   No resolved problems to display.         Hospital Course     Hospital Course:  Patient is an 82 y.o. female who presented to [facility] from  [facility] on 1/21/2023 with  altered mental status .  The patient does report that she \"just does not feel well\".  She reports that she is unsure why specifically, she reports some mild abdominal discomfort and feels nauseated.  Denies chest pain.  No shortness of breath.       Patient was admitted to this facility on 1/15/2023 for non-STEMI.  Patient's daughter/POA made the decision not to proceed with cardiac catheterization at that time and wanted medical management of CAD.  Patient was discharged on 1/16/2023.      In the ED, CT head showed no acute intracranial process.  Chest x-ray showed cardiomegaly with probable mild pulmonary edema and small bilateral pleural effusions.  EKG showed sinus rhythm.  Respiratory panel showed positive rhinovirus.  All labs unremarkable except proBNP 16,252, troponin 0.359, creatinine 1.34, albumin 2.6, hemoglobin 11.3, glucose 178.    All vital signs unremarkable.  Patient received Lasix in the ED.    Patient admitted to hospitalist " service for further evaluation and treatment    Conditions addressed while inpatient are as stated below    Shortness of breath  Elevated troponin and proBNP  Chest x-ray suggestive of mild pulmonary edema and small bilateral pleural effusion  Recently diagnosed with non-ST elevation MI 1/15/2022 however cardiac cath was not done as patient has advanced CKD and conservative medical management elected at that time.  On aspirin, Plavix, metoprolol and statin.  2D echo LVEF greater than 60%     Atrial fibrillation with rapid ventricular response  In the setting of hypotension.    Was given IV fluid bolus and also IV digoxin  Cardiologist following while in the patient  Improved- in normal sinus rhythm and rate controlled        Rhinovirus  Respiratory panel showed positive rhinovirus  Supportive care     COPD (chronic obstructive pulmonary disease)  Not in exacerbation     CKD (chronic kidney disease) stage 3b   Serum creatinine around baseline  Hyperuricemia-on allopurinol        Anemia in other chronic diseases classified elsewhere  Hemoglobin stable        Type 2 diabetes mellitus without complication, with long-term current use of insulin  Continue on home insulin regimen     GERD (gastroesophageal reflux disease)     Dementia   Alert and oriented x1 (self) currently     Obesity (BMI 30-39.9)  BMI 33.83        Parkinson's disease-on carbidopa levodopa      Patient will be discharged back to [facility] with hospice to follow      Condition at discharge- hemodynamically stable       DISCHARGE Follow Up Recommendations for labs and diagnostics:       Reasons For Change In Medications and Indications for New Medications:      Day of Discharge     Vital Signs:  Temp:  [97.4 °F (36.3 °C)-97.9 °F (36.6 °C)] 97.9 °F (36.6 °C)  Heart Rate:  [] 60  Resp:  [16-18] 18  BP: ()/(56-76) 92/56    Physical Exam:  Physical Exam  Vitals reviewed.   Constitutional:       General: She is not in acute distress.  HENT:      Head:  Normocephalic.      Nose: Nose normal.      Mouth/Throat:      Mouth: Mucous membranes are moist.   Eyes:      Extraocular Movements: Extraocular movements intact.      Conjunctiva/sclera: Conjunctivae normal.      Pupils: Pupils are equal, round, and reactive to light.   Cardiovascular:      Rate and Rhythm: Normal rate and regular rhythm.   Pulmonary:      Effort: No respiratory distress.   Abdominal:      General: Bowel sounds are normal.      Palpations: Abdomen is soft.      Tenderness: There is no abdominal tenderness.   Musculoskeletal:      Cervical back: Neck supple.      Comments: Degenerative changes   Skin:     General: Skin is warm.   Neurological:      Mental Status: She is alert. Mental status is at baseline.   Psychiatric:         Mood and Affect: Mood normal.            Pertinent  and/or Most Recent Results     LAB RESULTS:      Lab 01/24/23  0650 01/23/23  0937 01/22/23  1805 01/21/23 2019   WBC 5.80 8.80 7.40 9.10   HEMOGLOBIN 12.4 13.6 11.7* 11.3*   HEMATOCRIT 37.6 42.9 36.3 35.2   PLATELETS 289 332 265 218   NEUTROS ABS 2.90 5.60 4.70 6.50   LYMPHS ABS 2.10 1.90 1.70 1.40   MONOS ABS 0.70 1.00* 0.80 1.00*   EOS ABS 0.20 0.20 0.20 0.10   MCV 91.1 93.1 93.1 93.0   PROTIME  --   --  12.7*  --    APTT  --   --  36.9*  --          Lab 01/24/23  0650 01/23/23  0937 01/22/23  1805 01/21/23 2148   SODIUM 141 144 143 141   POTASSIUM 4.4 3.9 4.3 4.0   CHLORIDE 101 102 102 105   CO2 31.0* 30.0* 32.0* 25.0   ANION GAP 9.0 12.0 9.0 11.0   BUN 34* 24* 23 20   CREATININE 1.77* 1.40* 1.36* 1.34*   EGFR 28.1* 37.2* 38.5* 39.2*   GLUCOSE 97 118* 158* 178*   CALCIUM 8.7 9.4 9.0 8.9   MAGNESIUM  --  2.0 2.0 1.9         Lab 01/24/23  0650 01/23/23  0937 01/22/23 1805 01/21/23 2148   TOTAL PROTEIN 6.4 6.6 6.7 5.9*   ALBUMIN 3.0* 3.0* 3.2* 2.6*   GLOBULIN 3.4 3.6 3.5 3.3   ALT (SGPT) <5 7 7 5   AST (SGOT) 14 10 7 7   BILIRUBIN 0.4 0.6 0.4 0.3   ALK PHOS 91 105 109 99         Lab 01/22/23 1805 01/21/23 2148    PROBNP  --  16,252.0*   TROPONIN T 0.389* 0.359*   PROTIME 12.7*  --    INR 1.25*  --                  Brief Urine Lab Results  (Last result in the past 365 days)      Color   Clarity   Blood   Leuk Est   Nitrite   Protein   CREAT   Urine HCG        01/21/23 2019 Orange  Comment: Any Substance that causes an abnormal urine color can alter the accuracy of the chemical reactions.   Clear   Negative   Negative   Negative   30 mg/dL (1+)               Microbiology Results (last 10 days)     Procedure Component Value - Date/Time    CANDIDA AURIS SCREEN - Swab, Axilla Right, Axilla Left and Groin [[id number]]  (Normal) Collected: 01/22/23 0108    Lab Status: Preliminary result Specimen: Swab from Axilla Right, Axilla Left and Groin Updated: 01/24/23 0230     Candida Auris Screen Culture No Candida auris isolated at 2 days    Respiratory Panel PCR w/COVID-19(SARS-CoV-2) BRIDGETTE/KD/GAUEDA/PAD/COR/MAD/GONZALO In-House, NP Swab in UTM/VTM, 3-4 HR TAT - Swab, Nasopharynx [[id number]]  (Abnormal) Collected: 01/21/23 2019    Lab Status: Final result Specimen: Swab from Nasopharynx Updated: 01/21/23 2114     ADENOVIRUS, PCR Not Detected     Coronavirus 229E Not Detected     Coronavirus HKU1 Not Detected     Coronavirus NL63 Not Detected     Coronavirus OC43 Not Detected     COVID19 Not Detected     Human Metapneumovirus Not Detected     Human Rhinovirus/Enterovirus Detected     Influenza A PCR Not Detected     Influenza B PCR Not Detected     Parainfluenza Virus 1 Not Detected     Parainfluenza Virus 2 Not Detected     Parainfluenza Virus 3 Not Detected     Parainfluenza Virus 4 Not Detected     RSV, PCR Not Detected     Bordetella pertussis pcr Not Detected     Bordetella parapertussis PCR Not Detected     Chlamydophila pneumoniae PCR Not Detected     Mycoplasma pneumo by PCR Not Detected    Narrative:      In the setting of a positive respiratory panel with a viral infection PLUS a negative procalcitonin without other underlying concern  for bacterial infection, consider observing off antibiotics or discontinuation of antibiotics and continue supportive care. If the respiratory panel is positive for atypical bacterial infection (Bordetella pertussis, Chlamydophila pneumoniae, or Mycoplasma pneumoniae), consider antibiotic de-escalation to target atypical bacterial infection.          CT Head Without Contrast    Result Date: 1/21/2023  Impression: 1. No acute intracranial process. Limited by motion. MRI is more sensitive for the detection of acute nonhemorrhagic infarct. 2. Moderate changes small vessel ischemic disease of indeterminate age, presumably mostly chronic. Volume loss. Atherosclerosis. 3. Paranasal sinus disease. Fluid could represent acute sinusitis. Right mastoid effusion.  Electronically signed by:  Rasheed Mays M.D.  1/21/2023 7:51 PM Mountain Time    CT Head Without Contrast    Result Date: 1/15/2023  Impression: 1. No acute intracranial abnormality visible by CT. 2. Small chronic lacunar infarct in the left corona radiata and moderate chronic small vessel ischemic changes in the white matter.  This examination was interpreted by Saleem Denise M.D. Electronically signed by:  Saleem Denise M.D.  1/15/2023 9:34 PM Mountain Time    XR Chest 1 View    Result Date: 1/21/2023  Impression: Cardiomegaly with probable mild pulmonary edema and small bilateral pleural effusions. Electronically signed by:  Franklyn Monzon M.D.  1/21/2023 8:30 PM Mountain Time    XR Chest 1 View    Result Date: 1/16/2023  Impression: Impression: 1. Stable cardiomegaly. 2. Large hiatal hernia. 3. Hypoinflated lungs with mild bibasilar atelectasis. Electronically Signed: Ignacio Grubbs  1/16/2023 7:04 AM EST  Workstation ID: [id number]              Results for orders placed during the hospital encounter of 05/05/22    Adult Transthoracic Echo Complete w/ Color, Spectral and Contrast if Necessary Per Protocol    Interpretation Summary  · Left ventricular  ejection fraction appears to be 56 - 60%.  · No pericardial effusion noted      Labs Pending at Discharge:  Pending Labs     Order Current Status    CANDIDA AURIS SCREEN - Swab, Axilla Right, Axilla Left and Groin Preliminary result          Procedures Performed           Consults:   Consults     Date and Time Order Name Status Description    1/21/2023 11:27 PM Inpatient Cardiology Consult      1/21/2023 10:47 PM Hospitalist (on-call MD unless specified)      1/15/2023 11:40 PM Cardiology (on-call MD unless specified) Completed             Discharge Details        Discharge Medications      Changes to Medications      Instructions Start Date   insulin glargine 100 UNIT/ML injection  Commonly known as: LANTUS, SEMGLEE  What changed: how much to take   10 Units, Subcutaneous, Every Morning         Continue These Medications      Instructions Start Date   acetaminophen 325 MG tablet  Commonly known as: TYLENOL   650 mg, Oral, Every 6 Hours PRN      allopurinol 100 MG tablet  Commonly known as: ZYLOPRIM   200 mg, Oral, Daily      aspirin 81 MG EC tablet   81 mg, Oral, Daily      atorvastatin 20 MG tablet  Commonly known as: LIPITOR   20 mg, Oral, Nightly      budesonide-formoterol 160-4.5 MCG/ACT inhaler  Commonly known as: Symbicort   2 puffs, Inhalation, 2 Times Daily - RT      carbidopa-levodopa  MG per tablet  Commonly known as: SINEMET   1 tablet, Oral, 3 Times Daily      clopidogrel 75 MG tablet  Commonly known as: PLAVIX   75 mg, Oral, Daily      fish oil 1000 MG capsule capsule   1,000 mg, Oral, Daily With Breakfast      ipratropium-albuterol 0.5-2.5 mg/3 ml nebulizer  Commonly known as: DUO-NEB   3 mL, Nebulization, Every 4 Hours PRN      magnesium hydroxide 400 MG/5ML suspension  Commonly known as: MILK OF MAGNESIA   30 mL, Oral, Daily PRN      metoprolol tartrate 25 MG tablet  Commonly known as: LOPRESSOR   25 mg, Oral, 2 Times Daily      nitroglycerin 0.4 MG SL tablet  Commonly known as: NITROSTAT    0.4 mg, Sublingual, Every 5 Minutes PRN, Take no more than 3 doses in 15 minutes.      ondansetron 4 MG tablet  Commonly known as: ZOFRAN   4 mg, Oral, Every 6 Hours PRN      pantoprazole 40 MG EC tablet  Commonly known as: PROTONIX   40 mg, Oral, Daily         Stop These Medications    ALBUTEROL IN     furosemide 20 MG tablet  Commonly known as: LASIX            Allergies   Allergen Reactions   • Codeine Anaphylaxis   • Latex Anaphylaxis   • Namenda [Memantine] Anaphylaxis         Discharge Disposition:   Long Term Care (DC - External)    Diet:  Hospital:  Diet Order   Procedures   • Diet: Cardiac Diets, Diabetic Diets, Fluid Restriction (240 mL/tray) Diets; Healthy Heart (2-3 Na+); Consistent Carbohydrate; 1500 mL/day; Texture: Regular Texture (IDDSI 7); Fluid Consistency: Thin (IDDSI 0)         Discharge Activity:   Activity Instructions     Other Activity Instructions      Activity Instructions: Fall precaution            CODE STATUS:  Code Status and Medical Interventions:   Ordered at: 01/22/23 0204     Code Status (Patient has no pulse and is not breathing):    No CPR (Do Not Attempt to Resuscitate)     Medical Interventions (Patient has pulse or is breathing):    Full Support         No future appointments.    Additional Instructions for the Follow-ups that You Need to Schedule     Ambulatory referral to Home Hospice   As directed      Please evaluate Nory F Weathers for admission to Hospice.    Patient/Family aware of referral: yes    Order Comments: Please evaluate Nory F Weathers for admission to Hospice.  Patient/Family aware of referral: yes          Discharge Follow-up with PCP   As directed       Currently Documented PCP:    Giovani Mejia MD    PCP Phone Number:    [phone]     Follow Up Details: Follow-up with PCP as needed         Basic Metabolic Panel    Jan 29, 2023 (Approximate)      Release to patient: Routine Release               Time spent on Discharge including face to  face service:  33 minutes    This patient has been examined with appropriate PPE . 01/24/23      Signature: Electronically signed by Steve Waggoner MD, 01/24/23, 12:23 PM EST.

## 2023-01-24 NOTE — PLAN OF CARE
Goal Outcome Evaluation:                 Patient has discharge orders. Called and gave report to the nurse at the facility. IV removed. Waiting on the patients ride. Will continue to observe while patient is still here.

## 2023-01-24 NOTE — CASE MANAGEMENT/SOCIAL WORK
Case Management Discharge Note      Final Note: [facility]. [facility] to f/u at facility.    Provided Post Acute Provider List?: Yes  Post Acute Provider List: Hospice  N/A Provider List Comment: From [facility], will return at d/c.  Delivered To: Support Person  Support Person: Daughter/REESE Rivas  Method of Delivery: In person    Selected Continued Care - Discharged on 1/24/2023 Admission date: 1/21/2023 - Discharge disposition: Long Term Care (DC - External)    Destination Coordination complete.    Service Provider Selected Services Address Phone Fax Patient Preferred    [facility] [address] [place] [address] [phone] --                         Transportation Services  W/C Van: Skilled Nursing Facility Van    Final Discharge Disposition Code: 04 - intermediate care facility

## 2023-01-24 NOTE — DISCHARGE PLACEMENT REQUEST
"Nory Mckenzie (82 y.o. Female)     Date of Birth   1940    Social Security Number       Address   [address] [place] [facility]    Home Phone   [phone]    MRN   [id number]       [facility]    Marital Status                               Admission Date   1/21/23    Admission Type   Emergency    Admitting Provider   Rebeca Rebolledo DO    Attending Provider   Steve Waggoner MD    Department, Room/Bed   [facility] 3A MEDICAL INPATIENT, 343/1       Discharge Date       Discharge Disposition   Long Term Care (DC - External)    Discharge Destination                               Attending Provider: Steve Waggoner MD    Allergies: Codeine, Latex, Namenda [Memantine]    Isolation: Contact, Droplet   Infection: Rhinovirus  (01/21/23)   Code Status: No CPR    Ht: 160 cm (63\")   Wt: 75.3 kg (166 lb 0.1 oz)    Admission Cmt: None   Principal Problem: Congestive heart failure, unspecified HF chronicity, unspecified heart failure type (HCC) [I50.9]                 Active Insurance as of 1/21/2023     Primary Coverage     Payor Plan Insurance Group Employer/Plan Group    [facility] MEDICARE REPLACEMENT UNITED [facility] MEDICARE REPLACEMENT [id number]     Payor Plan Address Payor Plan Phone Number Payor Plan Fax Number Effective Dates    [address] [address]   1/1/2021 - None Entered    [place] [facility] [address]       Subscriber Name Subscriber Birth Date Member ID       NORY MCKENZIE 1940 [id number]           Secondary Coverage     Payor Plan Insurance Group Employer/Plan Group    [place]      Payor Plan Address Payor Plan Phone Number Payor Plan Fax Number Effective Dates    [address]   1/1/2020 - None Entered    [place] [address]       Subscriber Name Subscriber Birth Date Member ID       NORY MCKENZIE 1940 [id number]                 Emergency Contacts      (Rel.) Home Phone Work Phone Mobile Phone    " EVERT OLIVEIRA (Power of ) -- -- [phone]

## 2023-01-27 LAB — BACTERIA ISLT: NORMAL

## 2023-02-20 NOTE — CONSULTS
[facility]   CONSULT NOTE    Referring Provider: Dr. Vizcarra  Reason for Consultation: management of condition     Patient Care Team:  Giovani Mejia MD as PCP - General (Geriatric Medicine)  Sameer Lai MD as Surgeon (General Surgery)    Chief complaint altered mental status      History of present illness:  Nory Duff is a 82 y.o. female with past medical history of dementia, hyperlipidemia, hypertension, diabetes mellitus, coronary artery disease, and COPD presented to the ED with altered mental status on 1/15/2023. Patient is pleasant and confused but aware of memory loss. However, she is a poor historian as is her daughter currently at the bedside so majority of HPI maintained through bedside nurse, chart review, and daughter/POA Eveyln over the phone. Patient admitted from [facility] facility with increasing confusion over the last 24 hours. Cardiology consulted for management of condition as her troponin was elevated at 2.51. 12-lead EKG showed no acute ST segment changes. Serial troponin's since have decreased with most recent being 1.87. Patient states she had nonradiating left sided chest pain yesterday, but it has not returned. Her most recent echocardiogram in May of 2022 showed preserved EF of 56-60% with mild MVR and mild to moderate TVR. Patient currently denies chest pain, shortness of breath, edema, lightheadedness, palpitations, syncope, fatigue, nausea, vomiting.     Phone discussion had with Evelyn, patients oldest daughter/POA and she has made the decision not to proceed with cardiac catheterization at this time and would like to proceed with medical management of coronary artery disease.       Review of Systems   Constitutional: Negative for chills, fever and malaise/fatigue.   HENT: Negative.    Eyes: Negative.    Cardiovascular: Positive for chest pain. Negative for dyspnea on exertion, leg swelling, near-syncope,  e   palpitations and syncope.   Respiratory: Negative for cough and shortness of breath.    Skin: Negative.    Gastrointestinal: Negative for abdominal pain, nausea and vomiting.   Neurological: Negative for dizziness, focal weakness, light-headedness, numbness and weakness.   Psychiatric/Behavioral: Positive for altered mental status and memory loss.       History  Past Medical History:   Diagnosis Date   • Anemia    • Atherosclerotic heart disease of native coronary artery without angina pectoris    • COPD (chronic obstructive pulmonary disease) ([facility])    • COVID-19 03/2020   • Dementia ([facility])    • Diabetes mellitus ([facility])    • GERD (gastroesophageal reflux disease)    • Gout    • Hyperlipidemia    • Hypertension    • Localized edema    • Parkinson's disease ([facility])    • Vitamin D deficiency        Past Surgical History:   Procedure Laterality Date   • ENDOSCOPY N/A 1/2/2021    Procedure: ESOPHAGOGASTRODUODENOSCOPY;  Surgeon: Yoan Mendoza MD;  Location: [facility] ENDOSCOPY;  Service: Gastroenterology;  Laterality: N/A;  post: esophageal trauma from nasogastric tube, large hiatal hernia   • ENDOSCOPY N/A 5/6/2022    Procedure: ESOPHAGOGASTRODUODENOSCOPY with nasogastric tube placement;  Surgeon: Saleem Rubin MD;  Location: [facility] ENDOSCOPY;  Service: Gastroenterology;  Laterality: N/A;  post: large hiatal hernia, nasogastric tube placement       Family History   Family history unknown: Yes       Social History     Tobacco Use   • Smoking status: Never   • Smokeless tobacco: Never   Substance Use Topics   • Alcohol use: Never   • Drug use: Never        (Not in a hospital admission)        Codeine, Latex, and Namenda [memantine]    Scheduled Meds:aspirin, 81 mg, Oral, Daily  atorvastatin, 20 mg, Oral, Nightly  clopidogrel, 75 mg, Oral, Daily  insulin lispro, 2-7 Units, Subcutaneous, Q6H  metoprolol tartrate, 25 mg, Oral, BID  sodium chloride, 3 mL, Intravenous, Q12H      Continuous Infusions:sodium chloride, 75  "mL/hr, Last Rate: 75 mL/hr (01/16/23 0500)      PRN Meds:.•  dextrose  •  dextrose  •  glucagon (human recombinant)  •  nitroglycerin  •  ondansetron **OR** ondansetron  •  [COMPLETED] Insert Peripheral IV **AND** sodium chloride  •  sodium chloride  •  sodium chloride        VITAL SIGNS  Vitals:    01/16/23 1200 01/16/23 1230 01/16/23 1300 01/16/23 1330   BP: 109/41 106/91 100/57 113/54   Patient Position:       Pulse: 60 65 65 60   Resp:       Temp:       TempSrc:       SpO2: 98% 93% 100% 99%   Weight:       Height:           Flowsheet Rows    Flowsheet Row First Filed Value   Admission Height 160 cm (63\") Documented at 01/15/2023 2202   Admission Weight 86.7 kg (191 lb 1.6 oz) Documented at 01/15/2023 2210           TELEMETRY: sinus rhythm     Physical Exam:  Vitals reviewed.   Constitutional:       Appearance: Not in distress.   Eyes:      Pupils: Pupils are equal, round, and reactive to light.   Pulmonary:      Effort: Pulmonary effort is normal.      Breath sounds: Normal breath sounds.   Cardiovascular:      Normal rate. Regular rhythm.      Murmurs: There is a systolic murmur.   Pulses:     Intact distal pulses.   Edema:     Peripheral edema present.     Pretibial: bilateral trace edema of the pretibial area.     Ankle: bilateral trace edema of the ankle.     Feet: bilateral trace edema of the feet.  Abdominal:      General: Bowel sounds are normal.   Musculoskeletal: Normal range of motion.      Cervical back: Normal range of motion and neck supple. Skin:     General: Skin is warm.   Neurological:      Mental Status: Confused. Exhibits altered mental status.           LAB RESULTS (LAST 7 DAYS)    CBC  Results from last 7 days   Lab Units 01/16/23  0544 01/15/23  2220   WBC 10*3/mm3 6.80 8.10   RBC 10*6/mm3 3.40* 3.68*   HEMOGLOBIN g/dL 10.2* 11.2*   HEMATOCRIT % 31.1* 34.1   MCV fL 91.6 92.4   PLATELETS 10*3/mm3 327 370       BMP  Results from last 7 days   Lab Units 01/16/23  0544 01/15/23  2220   SODIUM " mmol/L 137 137   POTASSIUM mmol/L 4.4 4.1   CHLORIDE mmol/L 101 99   CO2 mmol/L 29.0 29.0   BUN mg/dL 33* 33*   CREATININE mg/dL 1.86* 1.85*   GLUCOSE mg/dL 109* 110*       CMP   Results from last 7 days   Lab Units 01/16/23  0544 01/15/23  2220   SODIUM mmol/L 137 137   POTASSIUM mmol/L 4.4 4.1   CHLORIDE mmol/L 101 99   CO2 mmol/L 29.0 29.0   BUN mg/dL 33* 33*   CREATININE mg/dL 1.86* 1.85*   GLUCOSE mg/dL 109* 110*   ALBUMIN g/dL  --  3.3*   BILIRUBIN mg/dL  --  0.3   ALK PHOS U/L  --  85   AST (SGOT) U/L  --  13   ALT (SGPT) U/L  --  <5   LIPASE U/L  --  8*         BNP        TROPONIN  Results from last 7 days   Lab Units 01/16/23  1209   TROPONIN T ng/mL 1.870*       CoAg  Results from last 7 days   Lab Units 01/16/23  0544 01/15/23  2220   INR   --  1.72*   APTT seconds >139.0* 46.4*       Creatinine Clearance  Estimated Creatinine Clearance: 23.5 mL/min (A) (by C-G formula based on SCr of 1.86 mg/dL (H)).    ABG        Radiology  CT Head Without Contrast    Result Date: 1/15/2023  1. No acute intracranial abnormality visible by CT. 2. Small chronic lacunar infarct in the left corona radiata and moderate chronic small vessel ischemic changes in the white matter.  This examination was interpreted by Saleem Denise M.D. Electronically signed by:  Saleem Denise M.D.  1/15/2023 9:34 PM Mountain Time    XR Chest 1 View    Result Date: 1/16/2023  Impression: 1. Stable cardiomegaly. 2. Large hiatal hernia. 3. Hypoinflated lungs with mild bibasilar atelectasis. Electronically Signed: Ignacio Grubbs  1/16/2023 7:04 AM EST  Workstation ID: [id number]          EKG        I personally viewed and interpreted the patient's EKG/Telemetry data:    ECHOCARDIOGRAM:    Results for orders placed during the hospital encounter of 05/05/22    Adult Transthoracic Echo Complete w/ Color, Spectral and Contrast if Necessary Per Protocol    Interpretation Summary  · Left ventricular ejection fraction appears to be 56 - 60%.  · No  pericardial effusion noted      STRESS MYOVIEW:    CARDIAC CATHETERIZATION:    OTHER:         Assessment & Plan       AMS (altered mental status)    Type 2 diabetes mellitus without complication, with long-term current use of insulin ([facility])    Hyperlipidemia    GERD (gastroesophageal reflux disease)    Hypertension    Dementia ([facility])    Anemia in other chronic diseases classified elsewhere    Parkinson's disease ([facility])    Cardiomegaly    COPD (chronic obstructive pulmonary disease) ([facility])    Elevated troponin    Acute on chronic renal failure ([facility])    NSTEMI (non-ST elevated myocardial infarction) ([facility])      Nory Duff is an 82 year old female with a medical history to include hyperlipidemia, hypertension, CAD, type 2 diabetes, and COPD who presented with altered mental status   CT of head negative for acute abnormalities   Patient currently denies chest pain  Troponin elevated with peak at 2.510 but trending down   12-lead EKG showed sinus rhythm with no acute ST segment changes  Non-STEMI with elevated troponin but patient is a DNR and family would not like to proceed with cardiac catheterization at this time  Will proceed with medical management   Metoprolol tartrate, aspirin, and Plavix restarted     Will start patient on statin therapy   Patient most recent lipid panel showed  and HDL 35  Will discontinue heparin drip   Echocardiogram 05/2022 showed preserved EF of 56-60% with mild MVR and mild to moderate TVR  Creatinine elevated at 1.86, baseline is closer to 1.3   Patient PO lasix has been reordered  Provider discussed plan of care with patients daughter and Evelyn LEIGH over the phone this afternoon   Continue current rx and supportive care  No further cardiac workup needed at this time     I discussed the patients findings and my recommendations with patient and nurse    Pita Rose, APRN  01/16/23  14:13 EST

## (undated) DEVICE — BITEBLOCK ENDO W/STRAP 60F A/ LF DISP

## (undated) DEVICE — PAPR PRNT PK SONY W RIBN UPC55

## (undated) DEVICE — PK ENDO GI 50

## (undated) DEVICE — SALEM SUMP DUAL LUMEN STOMACH TUBE WITH ENFIT CONNECTION: Brand: SALEM SUMP

## (undated) DEVICE — DEV CLIP HEMO DURACLIP REPOSTNG COLONOSCOPE 16MM 235CM
Type: IMPLANTABLE DEVICE | Site: STOMACH | Status: NON-FUNCTIONAL
Removed: 2022-05-06